# Patient Record
Sex: FEMALE | Race: WHITE | NOT HISPANIC OR LATINO | Employment: FULL TIME | URBAN - METROPOLITAN AREA
[De-identification: names, ages, dates, MRNs, and addresses within clinical notes are randomized per-mention and may not be internally consistent; named-entity substitution may affect disease eponyms.]

---

## 2022-01-18 ENCOUNTER — OFFICE VISIT (OUTPATIENT)
Dept: OBSTETRICS AND GYNECOLOGY | Facility: CLINIC | Age: 32
End: 2022-01-18
Payer: COMMERCIAL

## 2022-01-18 VITALS
BODY MASS INDEX: 17.2 KG/M2 | SYSTOLIC BLOOD PRESSURE: 112 MMHG | WEIGHT: 120.13 LBS | HEIGHT: 70 IN | DIASTOLIC BLOOD PRESSURE: 78 MMHG

## 2022-01-18 DIAGNOSIS — Z31.69 ENCOUNTER FOR PRECONCEPTION CONSULTATION: Primary | ICD-10-CM

## 2022-01-18 PROCEDURE — 99999 PR PBB SHADOW E&M-NEW PATIENT-LVL III: CPT | Mod: PBBFAC,,, | Performed by: OBSTETRICS & GYNECOLOGY

## 2022-01-18 PROCEDURE — 99999 PR PBB SHADOW E&M-NEW PATIENT-LVL III: ICD-10-PCS | Mod: PBBFAC,,, | Performed by: OBSTETRICS & GYNECOLOGY

## 2022-01-18 PROCEDURE — 99202 PR OFFICE/OUTPT VISIT, NEW, LEVL II, 15-29 MIN: ICD-10-PCS | Mod: S$GLB,,, | Performed by: OBSTETRICS & GYNECOLOGY

## 2022-01-18 PROCEDURE — 99202 OFFICE O/P NEW SF 15 MIN: CPT | Mod: S$GLB,,, | Performed by: OBSTETRICS & GYNECOLOGY

## 2022-01-18 RX ORDER — SPIRONOLACTONE 50 MG/1
50 TABLET, FILM COATED ORAL DAILY
COMMUNITY
Start: 2021-12-13 | End: 2022-08-23

## 2022-01-18 RX ORDER — TRETINOIN 0.25 MG/G
1 CREAM TOPICAL NIGHTLY
COMMUNITY
Start: 2021-12-13 | End: 2023-05-09

## 2022-01-18 RX ORDER — MULTIVIT WITH MINERALS/HERBS
1 TABLET ORAL DAILY
COMMUNITY
End: 2023-10-31

## 2022-01-18 RX ORDER — AZELAIC ACID 0.15 G/G
AEROSOL, FOAM TOPICAL
COMMUNITY
Start: 2022-01-05 | End: 2022-08-01

## 2022-01-18 RX ORDER — ERGOCALCIFEROL 1.25 MG/1
50000 CAPSULE ORAL DAILY
COMMUNITY
End: 2023-10-31

## 2022-01-18 NOTE — PROGRESS NOTES
Gynecology    SUBJECTIVE:     Chief Complaint: Establish Care (New PT- last pap unknown; LMP: 2022 not on BC at this time. /Per patient, she just had an annual with former GYN about 2 months ago. Patient would like to discuss fertility. )       History of Present Illness:  31 year old who for a preconception visit.  She was pregnant once before in 2017 and had a medical  that was unsuccessful, so she had a D&C after that.  She has no medical problems.  Had an abnormal pap smear about 13 years ago, no surgery.  Paps normal since then.  Has never had any other surgery.     Patient does have spironolactone to take for acne, but not taking it currently.  Also has retin-A and knows to stop this as well.  She is also taking Vitamin D3 and Vitamin B12.  She is taking B12 because she is a vegan.      She plans on seeing Dr. Antoine Garcia for primary care.      No family history of genetic disorders.  Does have regular monthly cycles- 22-28 days.  No history of sexually transmitted infections.  Does not have very painful cycles.     Hopes to become pregnant in the next 6 months.    Review of Systems:  Review of Systems   Genitourinary: Negative for menorrhagia, menstrual problem, pelvic pain, vaginal bleeding, vaginal discharge and vaginal pain.        OBJECTIVE:     Physical Exam:  Physical Exam  Vitals and nursing note reviewed.   Constitutional:       Appearance: She is well-developed and well-nourished.   Pulmonary:      Effort: Pulmonary effort is normal.   Skin:     Coloration: Skin is not pale.   Neurological:      Mental Status: She is oriented to person, place, and time.   Psychiatric:         Mood and Affect: Mood and affect normal.         Behavior: Behavior normal.         Thought Content: Thought content normal.         Judgment: Judgment normal.           ASSESSMENT:       ICD-10-CM ICD-9-CM    1. Encounter for preconception consultation  Z31.69 V26.49           Plan:      Yadira was seen today for  establish care.    Diagnoses and all orders for this visit:    Encounter for preconception consultation    19 minutes of total time was spent on the encounter which included face-to-face time and non-face-to-face time preparing to see the patient, obtaining and or reviewing   separately obtained history, documenting clinical information in the electronic or other health record, independently interpreting results (not separately reported) and   communicating results to the patient, or care coordination (not separately reported).    - reviewed medical history in detail and medications with patient  - discussed stopping retin A and spironolactone for about 1 month prior to conception  - appears to have normal monthly cycles; low suspicion for ovulatory complications or tubal factor to cause infertility  - discussed ovulation predictor kits, timed intercourse and pnv  - labs if interested: varicella, rubella, SMA, CF; not ordered today, patient to notify me if she would like to proceed with these  - low risk for infectious disease, but may also order HIV, hep B, gc/ct, RPR  - consider referral to nutrition if/when pregnant given vegan diet      No orders of the defined types were placed in this encounter.        Mary Grace Benitez

## 2022-01-18 NOTE — PROGRESS NOTES
History & Physical  Gynecology      SUBJECTIVE:     Chief Complaint: Establish Care (New PT- last pap unknown; LMP: 2022 not on BC at this time. /Per patient, she just had an annual with former GYN about 2 months ago. Patient would like to discuss fertility. )       History of Present Illness:  Annual Exam-Premenopausal  Patient presents for annual exam. The patient has *** complaints today***. Menstrual cycles are ***.  The patient {is/is not/has never been:21494} sexually active. GYN screening history: {gyn screen history:76904}. The patient participates in regular exercise: {yes/no/not asked:9010}.  Smoking status:  {yes/no/not asked:9010}    Contraception: ***    FH:  Breast cancer: ***  Colon cancer: ***  Ovarian cancer: ***    Review of patient's allergies indicates:   Allergen Reactions    Codeine Nausea And Vomiting       Past Medical History:   Diagnosis Date    Abnormal Pap smear of cervix      History reviewed. No pertinent surgical history.  OB History        1    Para        Term                AB   1    Living           SAB        IAB        Ectopic        Multiple        Live Births                   History reviewed. No pertinent family history.  Social History     Tobacco Use    Smoking status: Never Smoker    Smokeless tobacco: Never Used   Substance Use Topics    Alcohol use: Not Currently     Comment: drinks socially, occasionally     Drug use: Never       Current Outpatient Medications   Medication Sig    b complex vitamins tablet Take 1 tablet by mouth once daily. Takes over the counter, per patient.    ergocalciferol (VITAMIN D2) 50,000 unit Cap Take 50,000 Units by mouth once daily.    FINACEA 15 % Foam Apply topically.    tretinoin (RETIN-A) 0.025 % cream Apply 1 application topically nightly.    spironolactone (ALDACTONE) 50 MG tablet Take 50 mg by mouth once daily.     No current facility-administered medications for this visit.         Review of  Systems:  Review of Systems     OBJECTIVE:     Physical Exam:  Physical Exam    Chaperoned by: ***    ASSESSMENT:     No diagnosis found.       Plan:      There are no diagnoses linked to this encounter.    No orders of the defined types were placed in this encounter.      Well Woman:  - Pap smear ***  - Birth control: ***  - GC/CT:***  - Mammogram: ***  - Smoking cessation: ***  - Labs: ***   - Vaccines: ***  - Exercise counseling    ***:    Follow up in *** one year for annual or prn.    Mary Grace Benitez

## 2022-02-08 ENCOUNTER — OFFICE VISIT (OUTPATIENT)
Dept: INTERNAL MEDICINE | Facility: CLINIC | Age: 32
End: 2022-02-08
Payer: COMMERCIAL

## 2022-02-08 VITALS
HEART RATE: 65 BPM | SYSTOLIC BLOOD PRESSURE: 112 MMHG | WEIGHT: 122.81 LBS | OXYGEN SATURATION: 100 % | DIASTOLIC BLOOD PRESSURE: 72 MMHG | HEIGHT: 70 IN | BODY MASS INDEX: 17.58 KG/M2

## 2022-02-08 DIAGNOSIS — Z00.00 ANNUAL PHYSICAL EXAM: Primary | ICD-10-CM

## 2022-02-08 DIAGNOSIS — Z78.9 CONSUMES A VEGAN DIET: ICD-10-CM

## 2022-02-08 DIAGNOSIS — F41.8 SITUATIONAL ANXIETY: ICD-10-CM

## 2022-02-08 DIAGNOSIS — Z86.2 HISTORY OF ANEMIA: ICD-10-CM

## 2022-02-08 DIAGNOSIS — Z13.220 ENCOUNTER FOR LIPID SCREENING FOR CARDIOVASCULAR DISEASE: ICD-10-CM

## 2022-02-08 DIAGNOSIS — Z13.6 ENCOUNTER FOR LIPID SCREENING FOR CARDIOVASCULAR DISEASE: ICD-10-CM

## 2022-02-08 DIAGNOSIS — R63.4 WEIGHT LOSS, UNINTENTIONAL: ICD-10-CM

## 2022-02-08 DIAGNOSIS — F51.01 PRIMARY INSOMNIA: ICD-10-CM

## 2022-02-08 DIAGNOSIS — Z11.59 NEED FOR HEPATITIS C SCREENING TEST: ICD-10-CM

## 2022-02-08 PROCEDURE — 99999 PR PBB SHADOW E&M-EST. PATIENT-LVL III: ICD-10-PCS | Mod: PBBFAC,,, | Performed by: STUDENT IN AN ORGANIZED HEALTH CARE EDUCATION/TRAINING PROGRAM

## 2022-02-08 PROCEDURE — 99999 PR PBB SHADOW E&M-EST. PATIENT-LVL III: CPT | Mod: PBBFAC,,, | Performed by: STUDENT IN AN ORGANIZED HEALTH CARE EDUCATION/TRAINING PROGRAM

## 2022-02-08 PROCEDURE — 99385 PREV VISIT NEW AGE 18-39: CPT | Mod: S$GLB,,, | Performed by: STUDENT IN AN ORGANIZED HEALTH CARE EDUCATION/TRAINING PROGRAM

## 2022-02-08 PROCEDURE — 99385 PR PREVENTIVE VISIT,NEW,18-39: ICD-10-PCS | Mod: S$GLB,,, | Performed by: STUDENT IN AN ORGANIZED HEALTH CARE EDUCATION/TRAINING PROGRAM

## 2022-02-08 RX ORDER — TRAZODONE HYDROCHLORIDE 50 MG/1
50 TABLET ORAL NIGHTLY PRN
Qty: 30 TABLET | Refills: 2 | Status: SHIPPED | OUTPATIENT
Start: 2022-02-08 | End: 2022-05-08

## 2022-02-08 RX ORDER — PROPRANOLOL HYDROCHLORIDE 10 MG/1
TABLET ORAL
Qty: 20 TABLET | Refills: 1 | Status: SHIPPED | OUTPATIENT
Start: 2022-02-08 | End: 2022-12-15

## 2022-02-08 NOTE — PROGRESS NOTES
[facility]    Subjective:       Patient ID: Yadira Rodrigues is a 31 y.o. female.    Chief Complaint: Establish Care      History was obtained from the patient and supplemented through chart review.  This patient has not been seen by an [facility] medicine physician in the past 3 years and is new to me.    HPI:    Patient is a 31 y.o. female who presents to [facility].    Exercise: 3 x 30 min gym classes a week and few walks  Prior much less    Medical History  Past Medical History:   Diagnosis Date    Abnormal Pap smear of cervix        Review of Systems   Constitutional: Negative for fever.   HENT: Negative for trouble swallowing.    Respiratory: Negative for shortness of breath.    Cardiovascular: Negative for chest pain.   Gastrointestinal: Negative for constipation, diarrhea, nausea and vomiting.   Musculoskeletal: Negative for gait problem.   Neurological: Negative for dizziness and seizures.   Psychiatric/Behavioral: Negative for hallucinations.         Surgical hx, family hx, social hx   Have been reviewed      Current Outpatient Medications:     b complex vitamins tablet, Take 1 tablet by mouth once daily. Takes over the counter, per patient., Disp: , Rfl:     ergocalciferol (ERGOCALCIFEROL) 50,000 unit Cap, Take 50,000 Units by mouth once daily., Disp: , Rfl:     FINACEA 15 % Foam, Apply topically., Disp: , Rfl:     spironolactone (ALDACTONE) 50 MG tablet, Take 50 mg by mouth once daily., Disp: , Rfl:     tretinoin (RETIN-A) 0.025 % cream, Apply 1 application topically nightly., Disp: , Rfl:     propranoloL (INDERAL) 10 MG tablet, Take 10-20 mg as needed for performance anxiety.  Take 1/2-1 hour before situations., Disp: 20 tablet, Rfl: 1    traZODone (DESYREL) 50 MG tablet, Take 1 tablet (50 mg total) by mouth nightly as needed for Insomnia. (ok to try 100 mg), Disp: 30 tablet, Rfl: 2    Objective:        Body mass index is 17.62 kg/m².  Vitals:    02/08/22 1306   BP:  "112/72   Pulse: 65   SpO2: 100%   Weight: 55.7 kg (122 lb 12.7 oz)   Height: 5' 10" (1.778 m)   PainSc: 0-No pain     Physical Exam  Vitals and nursing note reviewed.   Constitutional:       General: She is not in acute distress.     Appearance: Normal appearance. She is not ill-appearing.   HENT:      Head: Normocephalic and atraumatic.      Nose:      Comments: Wearing a mask  Eyes:      General: No scleral icterus.  Cardiovascular:      Rate and Rhythm: Normal rate and regular rhythm.      Heart sounds: Normal heart sounds.   Pulmonary:      Effort: Pulmonary effort is normal.   Abdominal:      General: There is no distension.   Musculoskeletal:         General: No deformity.      Cervical back: Normal range of motion.   Skin:     General: Skin is warm and dry.   Neurological:      Mental Status: She is alert and oriented to person, place, and time.   Psychiatric:         Behavior: Behavior normal.           No results found for: WBC, HGB, HCT, PLT, CHOL, TRIG, HDL, LDLDIRECT, ALT, AST, NA, K, CL, CREATININE, BUN, CO2, TSH, PSA, INR, GLUF, HGBA1C, MICROALBUR    The ASCVD Risk score (Afton DC Jr., et al., 2013) failed to calculate for the following reasons:    The 2013 ASCVD risk score is only valid for ages 40 to 79    (Imaging have been independently reviewed)    Assessment:         1. Annual physical exam    2. Encounter for lipid screening for cardiovascular disease    3. History of anemia    4. Weight loss, unintentional    5. Need for hepatitis C screening test    6. Situational anxiety    7. Primary insomnia    8. Consumes a vegan diet          Plan:     Yadira was seen today for establish care.    Diagnoses and all orders for this visit:    Annual physical exam  -     Comprehensive Metabolic Panel; Future    Encounter for lipid screening for cardiovascular disease  -     Lipid Panel; Future    History of anemia  -     CBC Auto Differential; Future    Weight loss, unintentional  -     TSH; Future    Need for " hepatitis C screening test  -     Hepatitis C Antibody; Future    Situational anxiety  -     propranoloL (INDERAL) 10 MG tablet; Take 10-20 mg as needed for performance anxiety.  Take 1/2-1 hour before situations.    Primary insomnia  -     Ambulatory referral/consult to Sleep Disorders; Future  -     traZODone (DESYREL) 50 MG tablet; Take 1 tablet (50 mg total) by mouth nightly as needed for Insomnia. (ok to try 100 mg)    Consumes a vegan diet  -     Vitamin B12; Future  -     Folate; Future  -     Iron and TIBC; Future  -     Ferritin; Future        Health Maintenance  - Lipids: ordered  - A1C: glucose ordered fasting  - Colon Ca Screen: na  - Immunizations: J & J in April, Booster in Nov; tetanus approx 4-6 years    Women's health  - Pap: 10/21/2021 NILM and HPV neg, due in 5 years  - Mammo: na  - Dexa: na  - Contraception: spermicide    Follow up in about 1 year (around 2/8/2023). or sooner prn        All medications were reviewed including potential side effects and risks/benefits.  Pt was counseled to call back if anything worsens or if questions arise.    Antoine Uribe MD  Family Medicine  [facility]  [address]  [address]  [place] [address]  Phone [phone]  Fax [phone]

## 2022-02-11 ENCOUNTER — LAB VISIT (OUTPATIENT)
Dept: LAB | Facility: OTHER | Age: 32
End: 2022-02-11
Attending: STUDENT IN AN ORGANIZED HEALTH CARE EDUCATION/TRAINING PROGRAM
Payer: COMMERCIAL

## 2022-02-11 DIAGNOSIS — Z78.9 CONSUMES A VEGAN DIET: ICD-10-CM

## 2022-02-11 DIAGNOSIS — Z11.59 NEED FOR HEPATITIS C SCREENING TEST: ICD-10-CM

## 2022-02-11 DIAGNOSIS — Z13.6 ENCOUNTER FOR LIPID SCREENING FOR CARDIOVASCULAR DISEASE: ICD-10-CM

## 2022-02-11 DIAGNOSIS — R63.4 WEIGHT LOSS, UNINTENTIONAL: ICD-10-CM

## 2022-02-11 DIAGNOSIS — Z00.00 ANNUAL PHYSICAL EXAM: ICD-10-CM

## 2022-02-11 DIAGNOSIS — Z13.220 ENCOUNTER FOR LIPID SCREENING FOR CARDIOVASCULAR DISEASE: ICD-10-CM

## 2022-02-11 DIAGNOSIS — Z86.2 HISTORY OF ANEMIA: ICD-10-CM

## 2022-02-11 LAB
ALBUMIN SERPL BCP-MCNC: 4.3 G/DL (ref 3.5–5.2)
ALP SERPL-CCNC: 52 U/L (ref 55–135)
ALT SERPL W/O P-5'-P-CCNC: 9 U/L (ref 10–44)
ANION GAP SERPL CALC-SCNC: 7 MMOL/L (ref 8–16)
AST SERPL-CCNC: 13 U/L (ref 10–40)
BASOPHILS # BLD AUTO: 0.03 K/UL (ref 0–0.2)
BASOPHILS NFR BLD: 0.6 % (ref 0–1.9)
BILIRUB SERPL-MCNC: 1.9 MG/DL (ref 0.1–1)
BUN SERPL-MCNC: 8 MG/DL (ref 6–20)
CALCIUM SERPL-MCNC: 9.1 MG/DL (ref 8.7–10.5)
CHLORIDE SERPL-SCNC: 103 MMOL/L (ref 95–110)
CHOLEST SERPL-MCNC: 142 MG/DL (ref 120–199)
CHOLEST/HDLC SERPL: 2.2 {RATIO} (ref 2–5)
CO2 SERPL-SCNC: 25 MMOL/L (ref 23–29)
CREAT SERPL-MCNC: 0.8 MG/DL (ref 0.5–1.4)
DIFFERENTIAL METHOD: NORMAL
EOSINOPHIL # BLD AUTO: 0.2 K/UL (ref 0–0.5)
EOSINOPHIL NFR BLD: 3.1 % (ref 0–8)
ERYTHROCYTE [DISTWIDTH] IN BLOOD BY AUTOMATED COUNT: 12.5 % (ref 11.5–14.5)
EST. GFR  (AFRICAN AMERICAN): >60 ML/MIN/1.73 M^2
EST. GFR  (NON AFRICAN AMERICAN): >60 ML/MIN/1.73 M^2
FERRITIN SERPL-MCNC: 31 NG/ML (ref 20–300)
FOLATE SERPL-MCNC: 11.4 NG/ML (ref 4–24)
GLUCOSE SERPL-MCNC: 88 MG/DL (ref 70–110)
HCT VFR BLD AUTO: 38.7 % (ref 37–48.5)
HDLC SERPL-MCNC: 65 MG/DL (ref 40–75)
HDLC SERPL: 45.8 % (ref 20–50)
HGB BLD-MCNC: 13 G/DL (ref 12–16)
IMM GRANULOCYTES # BLD AUTO: 0.01 K/UL (ref 0–0.04)
IMM GRANULOCYTES NFR BLD AUTO: 0.2 % (ref 0–0.5)
IRON SERPL-MCNC: 113 UG/DL (ref 30–160)
LDLC SERPL CALC-MCNC: 68.6 MG/DL (ref 63–159)
LYMPHOCYTES # BLD AUTO: 2.1 K/UL (ref 1–4.8)
LYMPHOCYTES NFR BLD: 43.7 % (ref 18–48)
MCH RBC QN AUTO: 30.9 PG (ref 27–31)
MCHC RBC AUTO-ENTMCNC: 33.6 G/DL (ref 32–36)
MCV RBC AUTO: 92 FL (ref 82–98)
MONOCYTES # BLD AUTO: 0.3 K/UL (ref 0.3–1)
MONOCYTES NFR BLD: 6.9 % (ref 4–15)
NEUTROPHILS # BLD AUTO: 2.2 K/UL (ref 1.8–7.7)
NEUTROPHILS NFR BLD: 45.5 % (ref 38–73)
NONHDLC SERPL-MCNC: 77 MG/DL
NRBC BLD-RTO: 0 /100 WBC
PLATELET # BLD AUTO: 208 K/UL (ref 150–450)
PMV BLD AUTO: 10.3 FL (ref 9.2–12.9)
POTASSIUM SERPL-SCNC: 4 MMOL/L (ref 3.5–5.1)
PROT SERPL-MCNC: 7.2 G/DL (ref 6–8.4)
RBC # BLD AUTO: 4.21 M/UL (ref 4–5.4)
SATURATED IRON: 29 % (ref 20–50)
SODIUM SERPL-SCNC: 135 MMOL/L (ref 136–145)
TOTAL IRON BINDING CAPACITY: 395 UG/DL (ref 250–450)
TRANSFERRIN SERPL-MCNC: 267 MG/DL (ref 200–375)
TRIGL SERPL-MCNC: 42 MG/DL (ref 30–150)
TSH SERPL DL<=0.005 MIU/L-ACNC: 1.07 UIU/ML (ref 0.4–4)
VIT B12 SERPL-MCNC: 616 PG/ML (ref 210–950)
WBC # BLD AUTO: 4.78 K/UL (ref 3.9–12.7)

## 2022-02-11 PROCEDURE — 84466 ASSAY OF TRANSFERRIN: CPT | Performed by: STUDENT IN AN ORGANIZED HEALTH CARE EDUCATION/TRAINING PROGRAM

## 2022-02-11 PROCEDURE — 85025 COMPLETE CBC W/AUTO DIFF WBC: CPT | Performed by: STUDENT IN AN ORGANIZED HEALTH CARE EDUCATION/TRAINING PROGRAM

## 2022-02-11 PROCEDURE — 82728 ASSAY OF FERRITIN: CPT | Performed by: STUDENT IN AN ORGANIZED HEALTH CARE EDUCATION/TRAINING PROGRAM

## 2022-02-11 PROCEDURE — 80061 LIPID PANEL: CPT | Performed by: STUDENT IN AN ORGANIZED HEALTH CARE EDUCATION/TRAINING PROGRAM

## 2022-02-11 PROCEDURE — 82607 VITAMIN B-12: CPT | Performed by: STUDENT IN AN ORGANIZED HEALTH CARE EDUCATION/TRAINING PROGRAM

## 2022-02-11 PROCEDURE — 84443 ASSAY THYROID STIM HORMONE: CPT | Performed by: STUDENT IN AN ORGANIZED HEALTH CARE EDUCATION/TRAINING PROGRAM

## 2022-02-11 PROCEDURE — 86803 HEPATITIS C AB TEST: CPT | Performed by: STUDENT IN AN ORGANIZED HEALTH CARE EDUCATION/TRAINING PROGRAM

## 2022-02-11 PROCEDURE — 80053 COMPREHEN METABOLIC PANEL: CPT | Performed by: STUDENT IN AN ORGANIZED HEALTH CARE EDUCATION/TRAINING PROGRAM

## 2022-02-11 PROCEDURE — 36415 COLL VENOUS BLD VENIPUNCTURE: CPT | Performed by: STUDENT IN AN ORGANIZED HEALTH CARE EDUCATION/TRAINING PROGRAM

## 2022-02-11 PROCEDURE — 82746 ASSAY OF FOLIC ACID SERUM: CPT | Performed by: STUDENT IN AN ORGANIZED HEALTH CARE EDUCATION/TRAINING PROGRAM

## 2022-02-14 LAB — HCV AB SERPL QL IA: NEGATIVE

## 2022-03-17 ENCOUNTER — OFFICE VISIT (OUTPATIENT)
Dept: SLEEP MEDICINE | Facility: CLINIC | Age: 32
End: 2022-03-17
Payer: COMMERCIAL

## 2022-03-17 DIAGNOSIS — Z78.9 CONSUMES A VEGAN DIET: ICD-10-CM

## 2022-03-17 DIAGNOSIS — G47.25 JET LAG: ICD-10-CM

## 2022-03-17 DIAGNOSIS — F41.8 SITUATIONAL ANXIETY: ICD-10-CM

## 2022-03-17 DIAGNOSIS — F51.01 PRIMARY INSOMNIA: ICD-10-CM

## 2022-03-17 DIAGNOSIS — F51.02 ADJUSTMENT INSOMNIA: Primary | ICD-10-CM

## 2022-03-17 DIAGNOSIS — Z86.2 HISTORY OF ANEMIA: ICD-10-CM

## 2022-03-17 PROCEDURE — 99202 PR OFFICE/OUTPT VISIT, NEW, LEVL II, 15-29 MIN: ICD-10-PCS | Mod: 95,,, | Performed by: INTERNAL MEDICINE

## 2022-03-17 PROCEDURE — 99202 OFFICE O/P NEW SF 15 MIN: CPT | Mod: 95,,, | Performed by: INTERNAL MEDICINE

## 2022-03-17 RX ORDER — ZOLPIDEM TARTRATE 5 MG/1
5 TABLET ORAL NIGHTLY PRN
Qty: 30 TABLET | Refills: 2 | Status: SHIPPED | OUTPATIENT
Start: 2022-03-17 | End: 2023-05-09

## 2022-03-17 NOTE — PROGRESS NOTES
Subjective:       Patient ID: Yadira Rodrigues is a 31 y.o. female.    Chief Complaint: Sleeping Problem    I had the pleasure of seeing Yadira Rodrigues today, who is a 31 y.o. female that presents with difficulty sleeping with travel.    Yadira Rodrigues does not have a CDL.    Yadira Rodrigues is not a shift worker.    Yadira Rodrigues presents with has interrupted sleep that has been going on for 5 years    Bedtime when working ranges from 2245 to 2300.   When not working, bedtime ranges from 2245 to 2400.   Sleep latency ranges from 15 to 30+ minutes.     Average number of awakenings is 1 and return to sleep is quick.   Wake up time when working is 0630 to 0730.   When not working, wake up time is 0700 to 0800.   Patient does feel rested upon awakening.    Yadira Rodrigues consumes approximately 1-3 beverages with caffeine daily.   An average of 2 beverages with alcohol are consumed weekly   Medications taken for sleep currently: Xanax about 1x monthly  Previous medications taken: Ambien, trazodone     Yadira Rodrigues does not experience daytime sleepiness.   Naps are taken about 0 times weekly, usually lasting NA to NA minutes.  Yadira currently does operate an automobile.  Yadira Rodrigues does not experience drowsiness when driving.   Patient does not doze off when sedentary.   Yadira Rodrigues does not have auxiliary symptoms of narcolepsy including sleep onset paralysis, hypnagogic hallucinations, sleep attacks and cataplexy    EPWORTH SLEEPINESS SCALE 3/17/2022   Sitting and reading 0   Watching TV 0   Sitting, inactive in a public place (e.g. a theatre or a meeting) 0   As a passenger in a car for an hour without a break 0   Lying down to rest in the afternoon when circumstances permit 1   Sitting and talking to someone 0   Sitting quietly after a lunch without alcohol 0   In a car, while stopped for a few minutes in traffic 0   Total score 1       Yadira Rodrigues does not have a history of snoring.    Snoring is described as NA.   Apneic episodes have not been noticed during sleep.   A witness to sleep is present.   The patient awakens with NA.      Yadira Rodrigues does not have symptoms of Restless Legs Syndrome. Nocturnal leg movements have been noticed.   The patient does not experience sleep related leg cramps.   There is not a history of parasomnia.      Current Outpatient Medications:     b complex vitamins tablet, Take 1 tablet by mouth once daily. Takes over the counter, per patient., Disp: , Rfl:     ergocalciferol (ERGOCALCIFEROL) 50,000 unit Cap, Take 50,000 Units by mouth once daily., Disp: , Rfl:     FINACEA 15 % Foam, Apply topically., Disp: , Rfl:     propranoloL (INDERAL) 10 MG tablet, Take 10-20 mg as needed for performance anxiety.  Take 1/2-1 hour before situations., Disp: 20 tablet, Rfl: 1    spironolactone (ALDACTONE) 50 MG tablet, Take 50 mg by mouth once daily., Disp: , Rfl:     traZODone (DESYREL) 50 MG tablet, Take 1 tablet (50 mg total) by mouth nightly as needed for Insomnia. (ok to try 100 mg), Disp: 30 tablet, Rfl: 2    tretinoin (RETIN-A) 0.025 % cream, Apply 1 application topically nightly., Disp: , Rfl:      Review of patient's allergies indicates:   Allergen Reactions    Codeine Nausea And Vomiting         Past Medical History:   Diagnosis Date    Abnormal Pap smear of cervix        History reviewed. No pertinent surgical history.    Family History   Problem Relation Age of Onset    Hypertension Father     Hyperlipidemia Father     Prostate cancer Father         s/p resection    Hypertension Mother     Mental illness Sister     Mental illness Maternal Grandmother     Mental illness Paternal Grandmother     Cancer Maternal Uncle         lung?    Colon cancer Neg Hx     Breast cancer Neg Hx     Ovarian cancer Neg Hx        Social History     Socioeconomic History    Marital status:    Tobacco Use    Smoking status: Never Smoker    Smokeless tobacco:  Never Used   Substance and Sexual Activity    Alcohol use: Not Currently     Alcohol/week: 4.0 standard drinks     Types: 4 Standard drinks or equivalent per week     Comment: drinks socially, occasionally     Drug use: Never    Sexual activity: Yes     Partners: Male     Birth control/protection: Spermicide           Old medical records.    There were no vitals filed for this visit.           The patient was given open opportunity to ask questions and/or express concerns about treatment plan.   All questions/concerns were discussed.   Driving precautions were provided.     Two patient identifiers used prior to evaluation.    Thank you for referring Yadira BRITTANY Rodrigues for evaluation.             Past Medical History:   Diagnosis Date    Abnormal Pap smear of cervix      History reviewed. No pertinent surgical history.  Family History   Problem Relation Age of Onset    Hypertension Father     Hyperlipidemia Father     Prostate cancer Father         s/p resection    Hypertension Mother     Mental illness Sister     Mental illness Maternal Grandmother     Mental illness Paternal Grandmother     Cancer Maternal Uncle         lung?    Colon cancer Neg Hx     Breast cancer Neg Hx     Ovarian cancer Neg Hx      Social History     Socioeconomic History    Marital status:    Tobacco Use    Smoking status: Never Smoker    Smokeless tobacco: Never Used   Substance and Sexual Activity    Alcohol use: Not Currently     Alcohol/week: 4.0 standard drinks     Types: 4 Standard drinks or equivalent per week     Comment: drinks socially, occasionally     Drug use: Never    Sexual activity: Yes     Partners: Male     Birth control/protection: Spermicide       Current Outpatient Medications   Medication Sig Dispense Refill    b complex vitamins tablet Take 1 tablet by mouth once daily. Takes over the counter, per patient.      ergocalciferol (ERGOCALCIFEROL) 50,000 unit Cap Take 50,000 Units by mouth once  daily.      FINACEA 15 % Foam Apply topically.      propranoloL (INDERAL) 10 MG tablet Take 10-20 mg as needed for performance anxiety.  Take 1/2-1 hour before situations. 20 tablet 1    spironolactone (ALDACTONE) 50 MG tablet Take 50 mg by mouth once daily.      traZODone (DESYREL) 50 MG tablet Take 1 tablet (50 mg total) by mouth nightly as needed for Insomnia. (ok to try 100 mg) 30 tablet 2    tretinoin (RETIN-A) 0.025 % cream Apply 1 application topically nightly.       No current facility-administered medications for this visit.     Review of patient's allergies indicates:   Allergen Reactions    Codeine Nausea And Vomiting       Review of Systems    Objective:      There were no vitals filed for this visit.  Physical Exam    Lab Review:   BMP:   Lab Results   Component Value Date    GLU 88 02/11/2022     (L) 02/11/2022    K 4.0 02/11/2022     02/11/2022    CO2 25 02/11/2022    BUN 8 02/11/2022    CREATININE 0.8 02/11/2022    CALCIUM 9.1 02/11/2022     Diagnostics Review: Echo: Reviewed     Assessment:       1. Adjustment insomnia    2. Consumes a vegan diet    3. History of anemia    4. Situational anxiety    5. Jet lag        Plan:       Discontinue trazodone.   Ambien prn.   Side effects discussed.    The patient location is: home  The chief complaint leading to consultation is: intermittent insomnia    Visit type: audiovisual    Face to Face time with patient: 9  22 minutes of total time spent on the encounter, which includes face to face time and non-face to face time preparing to see the patient (eg, review of tests), Obtaining and/or reviewing separately obtained history, Documenting clinical information in the electronic or other health record, Independently interpreting results (not separately reported) and communicating results to the patient/family/caregiver, or Care coordination (not separately reported).         Each patient to whom he or she provides medical services by  telemedicine is:  (1) informed of the relationship between the physician and patient and the respective role of any other health care provider with respect to management of the patient; and (2) notified that he or she may decline to receive medical services by telemedicine and may withdraw from such care at any time.    Notes:

## 2022-05-07 DIAGNOSIS — F51.01 PRIMARY INSOMNIA: ICD-10-CM

## 2022-05-07 NOTE — TELEPHONE ENCOUNTER
No new care gaps identified.  [facility] Embedded Care Gaps. Reference number: [id number]. 5/07/2022   7:52:24 AM CDT

## 2022-05-07 NOTE — TELEPHONE ENCOUNTER
Refill Routing Note   Medication(s) are not appropriate for processing by [facility] for the following reason(s):      - Indication is outside of scope for [facility]    [facility] action(s):  Defer       Medication Therapy Plan: Indication outside of protocol  Medication reconciliation completed: No     Appointments  past 12m or future 3m with PCP    Date Provider   Last Visit   2/8/2022 Antoine Uribe MD   Next Visit   Visit date not found Antoine Uribe MD   ED visits in past 90 days: 0        Note composed:4:26 PM 05/07/2022

## 2022-05-08 RX ORDER — TRAZODONE HYDROCHLORIDE 50 MG/1
TABLET ORAL
Qty: 90 TABLET | Refills: 3 | Status: SHIPPED | OUTPATIENT
Start: 2022-05-08 | End: 2022-08-01

## 2022-06-01 ENCOUNTER — OFFICE VISIT (OUTPATIENT)
Dept: INTERNAL MEDICINE | Facility: CLINIC | Age: 32
End: 2022-06-01
Payer: COMMERCIAL

## 2022-06-01 DIAGNOSIS — R55 SYNCOPE, UNSPECIFIED SYNCOPE TYPE: Primary | ICD-10-CM

## 2022-06-01 PROCEDURE — 99214 OFFICE O/P EST MOD 30 MIN: CPT | Mod: 95,,, | Performed by: PHYSICIAN ASSISTANT

## 2022-06-01 PROCEDURE — 99214 PR OFFICE/OUTPT VISIT, EST, LEVL IV, 30-39 MIN: ICD-10-PCS | Mod: 95,,, | Performed by: PHYSICIAN ASSISTANT

## 2022-06-01 NOTE — PROGRESS NOTES
INTERNAL MEDICINE URGENT VISIT NOTE  VIRTUAL     CHIEF COMPLAINT     Chief Complaint   Patient presents with    Loss of Consciousness       HPI     Yadira Rodrigues is a 32 y.o. female who presents for an urgent visit today.    PCP is Antoine Uribe MD, patient is new to me.     Patient presents with complaints of syncopal episodes that started about two weeks ago. Episode happened when she was trying on her wedding gown (pt getting  in 4 weeks). She admits that she stood still in once place while trying on her gown, she would faint every time. She reports this happened about 3-4 times - never falling to the ground -was able to get to seated position before synopsizing. No associated chest pain or SOB.     She reports that these episodes have subsided but pt admits that she is not locking her knees when she stands still and has been limiting her activities -though she has been able to exercise daily. She denies associated nausea, vomiting, HA, vision changes. No significant history of passing out. Takes no medications.     Did have COVID 2 weeks before symptoms started - had lots of vomiting during COVD. She does admit that she hydrates well. She has also had irregular periods for the past two months -she attributed this to COVID. She doubts she is pregnant.     She denies lower extremity swelling, irregular or unexpected bleeding. Normal appetite.     She is in [place] during this VV  Face time is 30 mins.     Past Medical History:  Past Medical History:   Diagnosis Date    Abnormal Pap smear of cervix        Home Medications:  Prior to Admission medications    Medication Sig Start Date End Date Taking? Authorizing Provider   b complex vitamins tablet Take 1 tablet by mouth once daily. Takes over the counter, per patient.    Historical Provider   ergocalciferol (ERGOCALCIFEROL) 50,000 unit Cap Take 50,000 Units by mouth once daily.    Historical Provider   FINACEA 15 % Foam Apply topically. 1/5/22    Historical Provider   propranoloL (INDERAL) 10 MG tablet Take 10-20 mg as needed for performance anxiety.  Take 1/2-1 hour before situations. 2/8/22   Antoine Uribe MD   spironolactone (ALDACTONE) 50 MG tablet Take 50 mg by mouth once daily. 12/13/21   Historical Provider   traZODone (DESYREL) 50 MG tablet TAKE 1 TABLET (50 MG TOTAL) BY MOUTH NIGHTLY AS NEEDED FOR INSOMNIA 5/8/22   Antoine Uribe MD   tretinoin (RETIN-A) 0.025 % cream Apply 1 application topically nightly. 12/13/21   Historical Provider   zolpidem (AMBIEN) 5 MG Tab Take 1 tablet (5 mg total) by mouth nightly as needed. 3/17/22   Celena Molina MD       Review of Systems:  Review of Systems   Constitutional: Negative for activity change and unexpected weight change.   HENT: Negative for hearing loss, rhinorrhea and trouble swallowing.    Eyes: Negative for discharge and visual disturbance.   Respiratory: Negative for chest tightness and wheezing.    Cardiovascular: Negative for chest pain and palpitations.   Gastrointestinal: Negative for blood in stool, constipation, diarrhea and vomiting.   Endocrine: Negative for polydipsia and polyuria.   Genitourinary: Negative for difficulty urinating, dysuria, hematuria and menstrual problem.   Musculoskeletal: Negative for arthralgias, joint swelling and neck pain.   Neurological: Positive for syncope. Negative for weakness and headaches.   Psychiatric/Behavioral: Negative for confusion and dysphoric mood.       Health Maintainence:   Immunizations:  Health Maintenance       Date Due Completion Date    Cervical Cancer Screening Never done ---    TETANUS VACCINE Never done ---    HIV Screening 02/08/2028 (Originally 5/26/2005) ---           PHYSICAL EXAM     There were no vitals taken for this VIRTUAL visit.    Physical Exam  Constitutional:       Appearance: Normal appearance.      Comments: Healthy appearing female in NAD or apparent pain. She makes good eye contact, speaks in clear full sentences  and ambulates with ease.      HENT:      Head: Normocephalic and atraumatic.      Nose: Nose normal.      Mouth/Throat:      Pharynx: Oropharynx is clear.   Eyes:      Conjunctiva/sclera: Conjunctivae normal.   Cardiovascular:      Pulses: Normal pulses.   Pulmonary:      Effort: No respiratory distress.   Musculoskeletal:         General: Normal range of motion.      Cervical back: No rigidity.   Skin:     Capillary Refill: Capillary refill takes less than 2 seconds.      Findings: No rash.   Neurological:      General: No focal deficit present.      Mental Status: She is alert.   Psychiatric:         Mood and Affect: Mood normal.         LABS     No results found for: LABA1C, HGBA1C  CMP  Sodium   Date Value Ref Range Status   02/11/2022 135 (L) 136 - 145 mmol/L Final     Potassium   Date Value Ref Range Status   02/11/2022 4.0 3.5 - 5.1 mmol/L Final     Chloride   Date Value Ref Range Status   02/11/2022 103 95 - 110 mmol/L Final     CO2   Date Value Ref Range Status   02/11/2022 25 23 - 29 mmol/L Final     Glucose   Date Value Ref Range Status   02/11/2022 88 70 - 110 mg/dL Final     BUN   Date Value Ref Range Status   02/11/2022 8 6 - 20 mg/dL Final     Creatinine   Date Value Ref Range Status   02/11/2022 0.8 0.5 - 1.4 mg/dL Final     Calcium   Date Value Ref Range Status   02/11/2022 9.1 8.7 - 10.5 mg/dL Final     Total Protein   Date Value Ref Range Status   02/11/2022 7.2 6.0 - 8.4 g/dL Final     Albumin   Date Value Ref Range Status   02/11/2022 4.3 3.5 - 5.2 g/dL Final     Total Bilirubin   Date Value Ref Range Status   02/11/2022 1.9 (H) 0.1 - 1.0 mg/dL Final     Comment:     For infants and newborns, interpretation of results should be based  on gestational age, weight and in agreement with clinical  observations.    Premature Infant recommended reference ranges:  Up to 24 hours.............<8.0 mg/dL  Up to 48 hours............<12.0 mg/dL  3-5 days..................<15.0 mg/dL  6-29  days.................<15.0 mg/dL       Alkaline Phosphatase   Date Value Ref Range Status   02/11/2022 52 (L) 55 - 135 U/L Final     AST   Date Value Ref Range Status   02/11/2022 13 10 - 40 U/L Final     ALT   Date Value Ref Range Status   02/11/2022 9 (L) 10 - 44 U/L Final     Anion Gap   Date Value Ref Range Status   02/11/2022 7 (L) 8 - 16 mmol/L Final     eGFR if    Date Value Ref Range Status   02/11/2022 >60 >60 mL/min/1.73 m^2 Final     eGFR if non    Date Value Ref Range Status   02/11/2022 >60 >60 mL/min/1.73 m^2 Final     Comment:     Calculation used to obtain the estimated glomerular filtration  rate (eGFR) is the CKD-EPI equation.        Lab Results   Component Value Date    WBC 4.78 02/11/2022    HGB 13.0 02/11/2022    HCT 38.7 02/11/2022    MCV 92 02/11/2022     02/11/2022     Lab Results   Component Value Date    CHOL 142 02/11/2022     Lab Results   Component Value Date    HDL 65 02/11/2022     Lab Results   Component Value Date    LDLCALC 68.6 02/11/2022     Lab Results   Component Value Date    TRIG 42 02/11/2022     Lab Results   Component Value Date    CHOLHDL 45.8 02/11/2022     Lab Results   Component Value Date    TSH 1.069 02/11/2022       ASSESSMENT/PLAN     Yadira Rodrigues is a 32 y.o. female     Yadira was seen today for loss of consciousness. Outpatient work-up started, will have patient scheduled for inpatient evaluation as well. She is aware of ED prompts.     Diagnoses and all orders for this visit:    Syncope, unspecified syncope type  -     CBC Auto Differential; Future  -     Comprehensive Metabolic Panel; Future  -     EKG 12-lead; Future  -     HCG, Quantitative; Future  -     URINALYSIS  -     Urine culture       Patient was counseled on when and how to seek emergent care.       Anjana Petty PA-C   Department of Internal Medicine - [facility]   1:45 PM

## 2022-08-01 ENCOUNTER — OFFICE VISIT (OUTPATIENT)
Dept: OBSTETRICS AND GYNECOLOGY | Facility: CLINIC | Age: 32
End: 2022-08-01
Payer: COMMERCIAL

## 2022-08-01 ENCOUNTER — LAB VISIT (OUTPATIENT)
Dept: LAB | Facility: HOSPITAL | Age: 32
End: 2022-08-01
Attending: NURSE PRACTITIONER
Payer: COMMERCIAL

## 2022-08-01 VITALS
SYSTOLIC BLOOD PRESSURE: 118 MMHG | WEIGHT: 116.88 LBS | DIASTOLIC BLOOD PRESSURE: 76 MMHG | HEIGHT: 70 IN | BODY MASS INDEX: 16.73 KG/M2

## 2022-08-01 DIAGNOSIS — N92.6 LATE MENSES: Primary | ICD-10-CM

## 2022-08-01 DIAGNOSIS — N92.6 LATE MENSES: ICD-10-CM

## 2022-08-01 LAB
HCG INTACT+B SERPL-ACNC: <2.4 MIU/ML
TSH SERPL DL<=0.005 MIU/L-ACNC: 1.34 UIU/ML (ref 0.4–4)

## 2022-08-01 PROCEDURE — 99213 PR OFFICE/OUTPT VISIT, EST, LEVL III, 20-29 MIN: ICD-10-PCS | Mod: S$GLB,,, | Performed by: NURSE PRACTITIONER

## 2022-08-01 PROCEDURE — 84443 ASSAY THYROID STIM HORMONE: CPT | Performed by: NURSE PRACTITIONER

## 2022-08-01 PROCEDURE — 84702 CHORIONIC GONADOTROPIN TEST: CPT | Performed by: NURSE PRACTITIONER

## 2022-08-01 PROCEDURE — 36415 COLL VENOUS BLD VENIPUNCTURE: CPT | Mod: PN | Performed by: NURSE PRACTITIONER

## 2022-08-01 PROCEDURE — 99999 PR PBB SHADOW E&M-EST. PATIENT-LVL III: ICD-10-PCS | Mod: PBBFAC,,, | Performed by: NURSE PRACTITIONER

## 2022-08-01 PROCEDURE — 99213 OFFICE O/P EST LOW 20 MIN: CPT | Mod: S$GLB,,, | Performed by: NURSE PRACTITIONER

## 2022-08-01 PROCEDURE — 99999 PR PBB SHADOW E&M-EST. PATIENT-LVL III: CPT | Mod: PBBFAC,,, | Performed by: NURSE PRACTITIONER

## 2022-08-01 NOTE — PROGRESS NOTES
CC: late cycle     HPI: Pt is a 32 y.o.  female who presents c/o late cycle. LMP 6/28/22.  Reports cycles are normal monthly she has never been late in past. She reports some increased stress and she  has been traveling. UPT is negative.  She has upcoming trip planned later in the week and wants to ensure not pregnancy. She is not on contraception.        ROS:  GENERAL: Feeling well overall. Denies fever or chills.   SKIN: Denies rash or lesions.   HEAD: Denies head injury or headache.   NODES: Denies enlarged lymph nodes.   CHEST: Denies chest pain or shortness of breath.   CARDIOVASCULAR: Denies palpitations or left sided chest pain.   ABDOMEN: No abdominal pain, constipation, diarrhea, nausea, vomiting or rectal bleeding.   URINARY: No dysuria, hematuria, or burning on urination.  REPRODUCTIVE: See HPI.   BREASTS: Denies pain, lumps, or nipple discharge.   HEMATOLOGIC: No easy bruisability or excessive bleeding.   MUSCULOSKELETAL: Denies joint pain or swelling.   NEUROLOGIC: Denies syncope or weakness.   PSYCHIATRIC: Denies depression, anxiety or mood swings.    PE:   APPEARANCE: Well nourished, well developed, White female in no acute distress.  PELVIS: deferred       Diagnosis:  1. Late menses        Plan:   UPT is negative   Hcg to confirm   TSH  Discussed if no cycle after 3 months to notify clinic and will trial progesterone for withdrawal     Orders Placed This Encounter    HCG, Quantitative    TSH    POCT Urine Pregnancy         Follow-up PRN no resolution of symptoms.      Marta Gray, TATIANNA-STEVEN

## 2022-08-22 NOTE — PROGRESS NOTES
"The patient location is: home3  The chief complaint leading to consultation is: anxiety, panic attacks    Visit type: audiovisual    Face to Face time with patient: 22  35 minutes of total time spent on the encounter, which includes face to face time and non-face to face time preparing to see the patient (eg, review of tests), Obtaining and/or reviewing separately obtained history, Documenting clinical information in the electronic or other health record, Independently interpreting results (not separately reported) and communicating results to the patient/family/caregiver, or Care coordination (not separately reported).         Each patient to whom he or she provides medical services by telemedicine is:  (1) informed of the relationship between the physician and patient and the respective role of any other health care provider with respect to management of the patient; and (2) notified that he or she may decline to receive medical services by telemedicine and may withdraw from such care at any time.    Notes:             [facility]    Subjective:       Patient ID: Yadira Rodrigues is a 32 y.o. female.    Chief Complaint: No chief complaint on file.      History was obtained from the patient and supplemented through chart review.  This patient is known to me.     HPI:    Patient is a 32 y.o. female who presents due to panic attacks    At rehearsal dinner for wedding, "heart was beating" "difficulty breathing"  Had energy drink right before dinner  Wanted to leave but couldn't leave, nauseated  Disappeared as soon as able to leave the room, but then returned later that evening  Ok for day of wedding  Cut back on caffeeine afterwards    More recently, on plane to [place], needs to be on aisle  Multiple men organized movement with conflict with air      Rx hydroxyzine, few ativan  Referral therapy  Discussed minimal use of benzo  Declines ssri, discussed briefly  Sister doc in [facility] on " SSRI    Syncope after covid, resolved    Panic attacks/anxiety attacks    Exercise: 3 x 30 min gym classes a week and few walks  Prior much less    Medical History  Past Medical History:   Diagnosis Date    Abnormal Pap smear of cervix        Review of Systems   Constitutional: Negative for activity change.   HENT: Negative for hearing loss and trouble swallowing.    Eyes: Negative for discharge.   Respiratory: Negative for chest tightness and wheezing.    Cardiovascular: Negative for chest pain and palpitations.   Gastrointestinal: Negative for constipation, diarrhea and vomiting.   Genitourinary: Negative for difficulty urinating and hematuria.   Neurological: Negative for headaches.   Psychiatric/Behavioral: Negative for dysphoric mood. The patient is nervous/anxious.          Surgical hx, family hx, social hx   Have been reviewed      Current Outpatient Medications:     b complex vitamins tablet, Take 1 tablet by mouth once daily. Takes over the counter, per patient., Disp: , Rfl:     ergocalciferol (ERGOCALCIFEROL) 50,000 unit Cap, Take 50,000 Units by mouth once daily., Disp: , Rfl:     hydrOXYzine HCL (ATARAX) 10 MG Tab, Take 1-2 tabs as needed up to three times a day. Can cause drowsiness., Disp: 30 tablet, Rfl: 3    LORazepam (ATIVAN) 0.5 MG tablet, Take 1 tablet (0.5 mg total) by mouth every 6 (six) hours as needed for Anxiety., Disp: 15 tablet, Rfl: 0    propranoloL (INDERAL) 10 MG tablet, Take 10-20 mg as needed for performance anxiety.  Take 1/2-1 hour before situations., Disp: 20 tablet, Rfl: 1    spironolactone (ALDACTONE) 50 MG tablet, Take 50 mg by mouth once daily., Disp: , Rfl:     tretinoin (RETIN-A) 0.025 % cream, Apply 1 application topically nightly., Disp: , Rfl:     zolpidem (AMBIEN) 5 MG Tab, Take 1 tablet (5 mg total) by mouth nightly as needed., Disp: 30 tablet, Rfl: 2    Objective:        There is no height or weight on file to calculate BMI.  There were no vitals filed for this  visit.  Physical Exam  Vitals and nursing note reviewed.   Constitutional:       General: She is not in acute distress.     Appearance: Normal appearance. She is not ill-appearing.   HENT:      Head: Normocephalic and atraumatic.      Nose:      Comments: Wearing a mask  Eyes:      General: No scleral icterus.  Pulmonary:      Effort: Pulmonary effort is normal.   Musculoskeletal:         General: No deformity.      Cervical back: Normal range of motion.   Skin:     General: Skin is warm and dry.   Neurological:      Mental Status: She is alert and oriented to person, place, and time.   Psychiatric:         Behavior: Behavior normal.           Lab Results   Component Value Date    WBC 4.78 02/11/2022    HGB 13.0 02/11/2022    HCT 38.7 02/11/2022     02/11/2022    CHOL 142 02/11/2022    TRIG 42 02/11/2022    HDL 65 02/11/2022    ALT 9 (L) 02/11/2022    AST 13 02/11/2022     (L) 02/11/2022    K 4.0 02/11/2022     02/11/2022    CREATININE 0.8 02/11/2022    BUN 8 02/11/2022    CO2 25 02/11/2022    TSH 1.337 08/01/2022       The ASCVD Risk score (Dryden DC Jr., et al., 2013) failed to calculate for the following reasons:    The 2013 ASCVD risk score is only valid for ages 40 to 79    (Imaging have been independently reviewed)    Assessment:         1. Anxiety          Plan:     Diagnoses and all orders for this visit:    Anxiety  -     hydrOXYzine HCL (ATARAX) 10 MG Tab; Take 1-2 tabs as needed up to three times a day. Can cause drowsiness.  -     LORazepam (ATIVAN) 0.5 MG tablet; Take 1 tablet (0.5 mg total) by mouth every 6 (six) hours as needed for Anxiety.        No SI, precautions given    Health Maintenance  - Lipids:   - A1C:  - Colon Ca Screen: na  - Immunizations: J & J in April, Booster in Nov; tetanus approx 4-6 years    Women's health  - Pap: 10/21/2021 NILM and HPV neg, due in 5 years  - Mammo: na  - Dexa: na  - Contraception: spermicide    Follow up in about 4 weeks (around 9/20/2022). or  sooner prn        All medications were reviewed including potential side effects and risks/benefits.  Pt was counseled to call back if anything worsens or if questions arise.    Antoine Uribe MD  Family Medicine  [facility]  [employer] [address] [address]  Phone [phone]  Fax [phone]

## 2022-08-23 ENCOUNTER — OFFICE VISIT (OUTPATIENT)
Dept: INTERNAL MEDICINE | Facility: CLINIC | Age: 32
End: 2022-08-23
Payer: COMMERCIAL

## 2022-08-23 DIAGNOSIS — F41.9 ANXIETY: Primary | ICD-10-CM

## 2022-08-23 PROCEDURE — 99214 PR OFFICE/OUTPT VISIT, EST, LEVL IV, 30-39 MIN: ICD-10-PCS | Mod: 95,,, | Performed by: STUDENT IN AN ORGANIZED HEALTH CARE EDUCATION/TRAINING PROGRAM

## 2022-08-23 PROCEDURE — 99214 OFFICE O/P EST MOD 30 MIN: CPT | Mod: 95,,, | Performed by: STUDENT IN AN ORGANIZED HEALTH CARE EDUCATION/TRAINING PROGRAM

## 2022-08-23 RX ORDER — LORAZEPAM 0.5 MG/1
0.5 TABLET ORAL EVERY 6 HOURS PRN
Qty: 15 TABLET | Refills: 0 | Status: SHIPPED | OUTPATIENT
Start: 2022-08-23 | End: 2023-05-09

## 2022-08-23 RX ORDER — HYDROXYZINE HYDROCHLORIDE 10 MG/1
TABLET, FILM COATED ORAL
Qty: 30 TABLET | Refills: 3 | Status: SHIPPED | OUTPATIENT
Start: 2022-08-23 | End: 2022-12-15

## 2022-08-24 ENCOUNTER — PATIENT MESSAGE (OUTPATIENT)
Dept: INTERNAL MEDICINE | Facility: CLINIC | Age: 32
End: 2022-08-24
Payer: COMMERCIAL

## 2022-08-25 ENCOUNTER — TELEPHONE (OUTPATIENT)
Dept: INTERNAL MEDICINE | Facility: CLINIC | Age: 32
End: 2022-08-25
Payer: COMMERCIAL

## 2022-08-26 ENCOUNTER — PATIENT MESSAGE (OUTPATIENT)
Dept: INTERNAL MEDICINE | Facility: CLINIC | Age: 32
End: 2022-08-26
Payer: COMMERCIAL

## 2022-08-29 ENCOUNTER — PATIENT MESSAGE (OUTPATIENT)
Dept: PSYCHIATRY | Facility: OTHER | Age: 32
End: 2022-08-29
Payer: COMMERCIAL

## 2022-08-29 DIAGNOSIS — F41.9 ANXIETY: Primary | ICD-10-CM

## 2022-08-31 ENCOUNTER — PATIENT MESSAGE (OUTPATIENT)
Dept: PSYCHIATRY | Facility: OTHER | Age: 32
End: 2022-08-31
Payer: COMMERCIAL

## 2022-09-21 ENCOUNTER — OFFICE VISIT (OUTPATIENT)
Dept: PSYCHIATRY | Facility: OTHER | Age: 32
End: 2022-09-21
Payer: COMMERCIAL

## 2022-09-21 DIAGNOSIS — F41.9 ANXIETY: Primary | ICD-10-CM

## 2022-09-21 PROCEDURE — 90791 PSYCH DIAGNOSTIC EVALUATION: CPT | Mod: 95,,, | Performed by: SOCIAL WORKER

## 2022-09-21 PROCEDURE — 90791 PR PSYCHIATRIC DIAGNOSTIC EVALUATION: ICD-10-PCS | Mod: 95,,, | Performed by: SOCIAL WORKER

## 2022-09-21 NOTE — PROGRESS NOTES
".The patient location is: Home ([facility])  The chief complaint leading to consultation is: Initial Eval    Visit type: audiovisual    Face to Face time with patient: 60 mins  75 minutes of total time spent on the encounter, which includes face to face time and non-face to face time preparing to see the patient (eg, review of tests), Obtaining and/or reviewing separately obtained history, Documenting clinical information in the electronic or other health record, Independently interpreting results (not separately reported) and communicating results to the patient/family/caregiver, or Care coordination (not separately reported).         Each patient to whom he or she provides medical services by telemedicine is:  (1) informed of the relationship between the physician and patient and the respective role of any other health care provider with respect to management of the patient; and (2) notified that he or she may decline to receive medical services by telemedicine and may withdraw from such care at any time.    Type of service: Individual    [facility] - [facility] ([place])  Psychosocial Assessment      Date: 9/21/2022  Time: 9:38 AM    Name: Yadira Rodrigues    Chief Complaint: Panic attacks    Referred by: Dr. Uribe    History of Present Illness: "I've had high anxiety episodes before and it seems like they're coming back"    Medical History:   Past Medical History:   Diagnosis Date    Abnormal Pap smear of cervix        No past surgical history on file.    Family History   Problem Relation Age of Onset    Hypertension Father     Hyperlipidemia Father     Prostate cancer Father         s/p resection    Hypertension Mother     Mental illness Sister     Mental illness Maternal Grandmother     Mental illness Paternal Grandmother     Cancer Maternal Uncle         lung?    Colon cancer Neg Hx     Breast cancer Neg Hx     Ovarian cancer Neg Hx        Social History     Socioeconomic History    Marital status:    Tobacco " "Use    Smoking status: Never    Smokeless tobacco: Never   Substance and Sexual Activity    Alcohol use: Not Currently     Alcohol/week: 4.0 standard drinks     Types: 4 Standard drinks or equivalent per week     Comment: drinks socially, occasionally     Drug use: Never    Sexual activity: Yes     Partners: Male     Birth control/protection: Spermicide       Current Outpatient Medications   Medication Sig Dispense Refill    b complex vitamins tablet Take 1 tablet by mouth once daily. Takes over the counter, per patient.      ergocalciferol (ERGOCALCIFEROL) 50,000 unit Cap Take 50,000 Units by mouth once daily.      hydrOXYzine HCL (ATARAX) 10 MG Tab Take 1-2 tabs as needed up to three times a day. Can cause drowsiness. 30 tablet 3    LORazepam (ATIVAN) 0.5 MG tablet Take 1 tablet (0.5 mg total) by mouth every 6 (six) hours as needed for Anxiety. 15 tablet 0    propranoloL (INDERAL) 10 MG tablet Take 10-20 mg as needed for performance anxiety.  Take 1/2-1 hour before situations. 20 tablet 1    tretinoin (RETIN-A) 0.025 % cream Apply 1 application topically nightly.      zolpidem (AMBIEN) 5 MG Tab Take 1 tablet (5 mg total) by mouth nightly as needed. 30 tablet 2     No current facility-administered medications for this visit.       Review of patient's allergies indicates:   Allergen Reactions    Codeine Nausea And Vomiting       Family History: (mental illness, substance abuse, relationships, etc.)    Paternal: Dad lives in . Notes PGM had schizophrenia, she had ECT and that was helpful. Notes Dad's mental health is good, is retired now and is struggling with that change. "We're not close but I like him"  Maternal: Mom lives in , notes "we clash, we can't figure her out, she's definitely got something not right, she's very narcissistic, lies a lot" "There's a lot of bad mental illness in my family" "I just don't like to be around her because I always have to put on an act".   Siblings: 1 sister, she has anxiety " "also. Is very close with her sister. Notes sister was recently dxed as being on the spectrum.   Children: No children     Psychiatric History/Previous Substance Abuse TX (incluse response to past substance abuse tx): Pt states she had anxiety as a child "I was highly strung". Notes she had to be to school early every day, would get very anxious about not getting to school early enough. Notes recently she had a panic attack on a plane. Has rx of Lorazepam from Dr. Uribe. Notes a lot of times anxiety is brought on by not being able to leave a situation. Notes with her menstrual cycle there are days she is "way more down and lethargic than other days".     Past Psychiatric History:   Therapy, outpt. 4 years ago in grad school she had a spell of anxiety and saw a therapist. Found it helpful. Also had anxiety after grad school when she was in [facility] "both sets of anxiety was about lack of control. Notes when she was in [place] she would often have intrusive thoughts about jumping in front of a train, was worried about not having control.     Is pt currently in treatment with a therapist or psychiatrist? No, will f/u with this writer     Hinduism/Spiritual Orientation:Unknown    Sexual/Physical Abuse (indicate if patient is abuser or the abused): Denied, notes "My Mom wasn't great, but she never physically assaulted me but there were a lot of scary situations, she used to really scare us".     Sexual Orientation and History:  to  a few months ago, notes she had what she thinks was a panic attack at her rehearsal dinner. 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 Notes the balance between teaching and research is very stressful. 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  works the same job as her, they do most of " "their research together. Is not teaching this semester, will start teaching next semester, goes in 2 days per week.     Legal Issues: None    Financial Status: Finances not a stressor for them.     Social, Peer Group, Living Situation, and Living Environment: Moved here [place] ago from [place], moved for 's job at [employer]. Notes  is biggest support, notes she is close with in-laws. Doesn't have a lot of friends here, notes "it's kind of hard to make friends when you don't have kids".     Leisure and Recreation Activities: Likes to watch non-stressful tv shows. Likes to go out for dinner and go out for walks.     Nutrition: Notes she pays a lot of attention to what she eats, does predominantly whole food plant based eating. Is trying to cook more.     Sleep: Takes Ambien if she is traveling overnight, or takes it prn if she is stressed about work. Takes something to help her sleep about 1x/month. Notes she is in bed for about 8 hours but notes it takes an hour to get to sleep.     Exercise: Notes "it comes and goes. It's the first thing to go when I'm busy", notes she walks every day, notes she gets heart rate up and/or lifts weights 1x/week.     Stressors:Occupational Concerns    Substance Use History: (include age of onset, duration, intensity, patterns of use, relapse history, and consequences of use for each substance): Notes she used to drink very heavily "I'd get blackout", notes that triggered anxiety about not being in control of her behavior. Managed to get her drinking under control.     Any Other Addictive Behaviors: Denies    Motivation for change:  yes    Impressions: Pt is a 32 year old female who presents for therapy to help address anxiety. Pt will benefit from supportive counseling and CBT.     Clinical diagnosis/priority: Anxiety  Psychosocial/cultural factors: Pt new at [employer], on tenure track, will start teaching next semester  Current level of functioning: Moderate        "

## 2022-10-10 ENCOUNTER — PATIENT MESSAGE (OUTPATIENT)
Dept: INTERNAL MEDICINE | Facility: CLINIC | Age: 32
End: 2022-10-10
Payer: COMMERCIAL

## 2022-10-26 ENCOUNTER — TELEPHONE (OUTPATIENT)
Dept: PSYCHIATRY | Facility: OTHER | Age: 32
End: 2022-10-26
Payer: COMMERCIAL

## 2022-10-26 NOTE — TELEPHONE ENCOUNTER
Contacted Ms. Rodrigues in regards to her appointment tomorrow with Polly Aguirre LCSW. There was no answer, left detailed message that we require updated Insurance information in order to proceed with her scheduled visit tomorrow or if she would like to be self-pay we can get her in touch with the fee for service department at [phone], however appointment has been cancelled.     Also sent portal message.

## 2022-10-27 ENCOUNTER — PATIENT MESSAGE (OUTPATIENT)
Dept: SPINE | Facility: CLINIC | Age: 32
End: 2022-10-27
Payer: COMMERCIAL

## 2022-11-01 ENCOUNTER — OFFICE VISIT (OUTPATIENT)
Dept: OBSTETRICS AND GYNECOLOGY | Facility: CLINIC | Age: 32
End: 2022-11-01
Payer: COMMERCIAL

## 2022-11-01 VITALS
BODY MASS INDEX: 17.2 KG/M2 | SYSTOLIC BLOOD PRESSURE: 112 MMHG | WEIGHT: 120.13 LBS | HEIGHT: 70 IN | DIASTOLIC BLOOD PRESSURE: 64 MMHG

## 2022-11-01 DIAGNOSIS — Z01.419 WELL WOMAN EXAM WITH ROUTINE GYNECOLOGICAL EXAM: Primary | ICD-10-CM

## 2022-11-01 PROCEDURE — 99395 PREV VISIT EST AGE 18-39: CPT | Mod: S$GLB,,, | Performed by: OBSTETRICS & GYNECOLOGY

## 2022-11-01 PROCEDURE — 99999 PR PBB SHADOW E&M-EST. PATIENT-LVL III: CPT | Mod: PBBFAC,,, | Performed by: OBSTETRICS & GYNECOLOGY

## 2022-11-01 PROCEDURE — 99999 PR PBB SHADOW E&M-EST. PATIENT-LVL III: ICD-10-PCS | Mod: PBBFAC,,, | Performed by: OBSTETRICS & GYNECOLOGY

## 2022-11-01 PROCEDURE — 99395 PR PREVENTIVE VISIT,EST,18-39: ICD-10-PCS | Mod: S$GLB,,, | Performed by: OBSTETRICS & GYNECOLOGY

## 2022-11-01 NOTE — PROGRESS NOTES
History & Physical  Gynecology      SUBJECTIVE:     Chief Complaint: Well Woman (One past abnorm pap- 10 years ago. Every pap since then has been normal )       History of Present Illness:  Annual Exam-Premenopausal  Patient presents for annual exam. She has no complaints today. Menstrual cycles are monthly.  She is sexually active. GYN screening history: last pap: approximate date 10/2021 paphpv (in care everywhere) and was normal. She participates in regular exercise: yes.  Smoking status:  no    Contraception: none, coitus interuptus    FH:  Breast cancer: neg  Colon cancer: neg  Ovarian cancer: neg    Review of patient's allergies indicates:   Allergen Reactions    Codeine Nausea And Vomiting       Past Medical History:   Diagnosis Date    Abnormal Pap smear of cervix      History reviewed. No pertinent surgical history.  OB History          1    Para        Term                AB   1    Living             SAB        IAB        Ectopic        Multiple        Live Births                   Family History   Problem Relation Age of Onset    Hypertension Father     Hyperlipidemia Father     Prostate cancer Father         s/p resection    Hypertension Mother     Mental illness Sister     Mental illness Maternal Grandmother     Mental illness Paternal Grandmother     Cancer Maternal Uncle         lung?    Colon cancer Neg Hx     Breast cancer Neg Hx     Ovarian cancer Neg Hx      Social History     Tobacco Use    Smoking status: Never    Smokeless tobacco: Never   Substance Use Topics    Alcohol use: Not Currently     Alcohol/week: 4.0 standard drinks     Types: 4 Standard drinks or equivalent per week     Comment: drinks socially, occasionally     Drug use: Never       Current Outpatient Medications   Medication Sig    b complex vitamins tablet Take 1 tablet by mouth once daily. Takes over the counter, per patient.    ergocalciferol (ERGOCALCIFEROL) 50,000 unit Cap Take 50,000 Units by mouth once daily.     hydrOXYzine HCL (ATARAX) 10 MG Tab Take 1-2 tabs as needed up to three times a day. Can cause drowsiness.    LORazepam (ATIVAN) 0.5 MG tablet Take 1 tablet (0.5 mg total) by mouth every 6 (six) hours as needed for Anxiety.    propranoloL (INDERAL) 10 MG tablet Take 10-20 mg as needed for performance anxiety.  Take 1/2-1 hour before situations.    tretinoin (RETIN-A) 0.025 % cream Apply 1 application topically nightly.    zolpidem (AMBIEN) 5 MG Tab Take 1 tablet (5 mg total) by mouth nightly as needed.     No current facility-administered medications for this visit.         Review of Systems:  Review of Systems   Constitutional:  Negative for appetite change, fever and unexpected weight change.   Respiratory:  Negative for shortness of breath.    Cardiovascular:  Negative for chest pain.   Gastrointestinal:  Negative for nausea and vomiting.   Genitourinary:  Negative for menorrhagia, menstrual problem, pelvic pain, vaginal bleeding, vaginal discharge and vaginal pain.   Integumentary:  Negative for breast mass.   Breast: Negative for lump and mass     OBJECTIVE:     Physical Exam:  Physical Exam  Vitals and nursing note reviewed.   Constitutional:       Appearance: She is well-developed.   Cardiovascular:      Rate and Rhythm: Normal rate and regular rhythm.      Heart sounds: Normal heart sounds.   Pulmonary:      Effort: Pulmonary effort is normal.      Breath sounds: Normal breath sounds.   Chest:   Breasts:     Breasts are symmetrical.      Right: No inverted nipple, mass, nipple discharge, skin change or tenderness.      Left: No inverted nipple, mass, nipple discharge, skin change or tenderness.   Abdominal:      Palpations: Abdomen is soft.   Genitourinary:     General: Normal vulva.      Labia:         Right: No rash, tenderness, lesion or injury.         Left: No rash, tenderness, lesion or injury.       Urethra: No prolapse, urethral pain, urethral swelling or urethral lesion.      Vagina: Normal. No  signs of injury and foreign body. No vaginal discharge, erythema, tenderness or bleeding.      Cervix: No cervical motion tenderness, discharge or friability.      Uterus: Not deviated, not enlarged, not fixed and not tender.       Adnexa:         Right: No mass, tenderness or fullness.          Left: No mass, tenderness or fullness.        Rectum: No anal fissure or external hemorrhoid.      Comments: Urethral meatus: normal size, anterior vaginal wall with no prolapse, no lesions  Bladder: no fullness, masses or tenderness  Musculoskeletal:      Cervical back: Normal range of motion.   Neurological:      Mental Status: She is alert and oriented to person, place, and time.   Psychiatric:         Behavior: Behavior normal.         Thought Content: Thought content normal.         Judgment: Judgment normal.       Chaperoned by: Lynnette    ASSESSMENT:       ICD-10-CM ICD-9-CM    1. Well woman exam with routine gynecological exam  Z01.419 V72.31              Plan:      Yadira was seen today for well woman.    Diagnoses and all orders for this visit:    Well woman exam with routine gynecological exam      No orders of the defined types were placed in this encounter.      Well Woman:  - Pap smear and hpv completed (care everywhere)  - Birth control: none, recommended pnv  - GC/CT:n/a  - Mammogram: due age 40  - Smoking cessation: n/a  - Labs: none required  - Vaccines: none required  - Exercise counseling      Follow up in  one year for annual or prn.    Mary Grace Benitez

## 2022-11-03 ENCOUNTER — PATIENT MESSAGE (OUTPATIENT)
Dept: OBSTETRICS AND GYNECOLOGY | Facility: CLINIC | Age: 32
End: 2022-11-03
Payer: COMMERCIAL

## 2022-11-07 ENCOUNTER — OFFICE VISIT (OUTPATIENT)
Dept: PSYCHIATRY | Facility: OTHER | Age: 32
End: 2022-11-07
Payer: COMMERCIAL

## 2022-11-07 DIAGNOSIS — F41.9 ANXIETY: Primary | ICD-10-CM

## 2022-11-07 PROCEDURE — 90837 PSYTX W PT 60 MINUTES: CPT | Mod: 95,,, | Performed by: SOCIAL WORKER

## 2022-11-07 PROCEDURE — 90837 PR PSYCHOTHERAPY W/PATIENT, 60 MIN: ICD-10-PCS | Mod: 95,,, | Performed by: SOCIAL WORKER

## 2022-11-07 NOTE — PROGRESS NOTES
"The patient location is: Home ([facility])  The chief complaint leading to consultation is: f/u anxiety    Visit type: audiovisual    Face to Face time with patient: 50 mins  60 minutes of total time spent on the encounter, which includes face to face time and non-face to face time preparing to see the patient (eg, review of tests), Obtaining and/or reviewing separately obtained history, Documenting clinical information in the electronic or other health record, Independently interpreting results (not separately reported) and communicating results to the patient/family/caregiver, or Care coordination (not separately reported).         Each patient to whom he or she provides medical services by telemedicine is:  (1) informed of the relationship between the physician and patient and the respective role of any other health care provider with respect to management of the patient; and (2) notified that he or she may decline to receive medical services by telemedicine and may withdraw from such care at any time.    Type of service: Individual    Content of session: Pt notes she has felt better overall, but notes she has had a few episodes of panic. Notes this happens "when I feel like I'm in something and I can't leave, then I get in my head". Notes distraction helps during that time. Was in a car with in laws and felt really hot and needed to cool down. Notes, "it's just minor but I'm worried about what it can become". Notes "I've seen mental health in my family and what it's become for them and I don't want that to happen for me", notes (Paternal) grandmother, mother and sister all struggle with their mental health and some agorophobia, notes grandmother got "very stressed out" leaving the house. Notes she had "shock therapy" at 50, and another one at 83. Notes her ssiter was fine until 2 years ago, couldn't get out of bed, "complete panic mode", was in that state for about 2 weeks. Notes if she is very busy at work, "I'm " "stressed but I'm not anxious", notes she was anxious when her sister was going through her own anxiety. 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 Notes her Dad's mental health is ok, but notes "he seems a bit sad" since his intermediate. Pt notes she doesn't have many friends but is introverted and feels ok with that, but notes she is unsure why it's hard to make friends, whether it's [place] or her age group. Discussed that expanding her friend group may help her feel less anxious if she has more things to occupy her time. Overall, pt coping well. Will f/u in 3 weeks.      Therapeutic techniques and approaches: behavior modification and supportive counseling. Rationale: appropriate for presenting issues.     Pt denies SI/HI. Mood was euthymic, affect matches verbal content. AAOx3, participated fully in session.     Time spent in counselinmins       "

## 2022-12-15 ENCOUNTER — OFFICE VISIT (OUTPATIENT)
Dept: INTERNAL MEDICINE | Facility: CLINIC | Age: 32
End: 2022-12-15
Payer: COMMERCIAL

## 2022-12-15 VITALS
OXYGEN SATURATION: 100 % | HEART RATE: 94 BPM | HEIGHT: 70 IN | WEIGHT: 123.44 LBS | DIASTOLIC BLOOD PRESSURE: 78 MMHG | BODY MASS INDEX: 17.67 KG/M2 | SYSTOLIC BLOOD PRESSURE: 118 MMHG

## 2022-12-15 DIAGNOSIS — Z86.2 HISTORY OF ANEMIA: ICD-10-CM

## 2022-12-15 DIAGNOSIS — Z78.9 CONSUMES A VEGAN DIET: ICD-10-CM

## 2022-12-15 DIAGNOSIS — F41.8 SITUATIONAL ANXIETY: Primary | ICD-10-CM

## 2022-12-15 DIAGNOSIS — R63.5 WEIGHT GAIN: ICD-10-CM

## 2022-12-15 DIAGNOSIS — R82.90 MALODOROUS URINE: ICD-10-CM

## 2022-12-15 PROCEDURE — 99999 PR PBB SHADOW E&M-EST. PATIENT-LVL III: CPT | Mod: PBBFAC,,, | Performed by: STUDENT IN AN ORGANIZED HEALTH CARE EDUCATION/TRAINING PROGRAM

## 2022-12-15 PROCEDURE — 3074F PR MOST RECENT SYSTOLIC BLOOD PRESSURE < 130 MM HG: ICD-10-PCS | Mod: CPTII,S$GLB,, | Performed by: STUDENT IN AN ORGANIZED HEALTH CARE EDUCATION/TRAINING PROGRAM

## 2022-12-15 PROCEDURE — 99999 PR PBB SHADOW E&M-EST. PATIENT-LVL III: ICD-10-PCS | Mod: PBBFAC,,, | Performed by: STUDENT IN AN ORGANIZED HEALTH CARE EDUCATION/TRAINING PROGRAM

## 2022-12-15 PROCEDURE — 3074F SYST BP LT 130 MM HG: CPT | Mod: CPTII,S$GLB,, | Performed by: STUDENT IN AN ORGANIZED HEALTH CARE EDUCATION/TRAINING PROGRAM

## 2022-12-15 PROCEDURE — 99213 OFFICE O/P EST LOW 20 MIN: CPT | Mod: S$GLB,,, | Performed by: STUDENT IN AN ORGANIZED HEALTH CARE EDUCATION/TRAINING PROGRAM

## 2022-12-15 PROCEDURE — 3008F PR BODY MASS INDEX (BMI) DOCUMENTED: ICD-10-PCS | Mod: CPTII,S$GLB,, | Performed by: STUDENT IN AN ORGANIZED HEALTH CARE EDUCATION/TRAINING PROGRAM

## 2022-12-15 PROCEDURE — 1159F MED LIST DOCD IN RCRD: CPT | Mod: CPTII,S$GLB,, | Performed by: STUDENT IN AN ORGANIZED HEALTH CARE EDUCATION/TRAINING PROGRAM

## 2022-12-15 PROCEDURE — 3078F PR MOST RECENT DIASTOLIC BLOOD PRESSURE < 80 MM HG: ICD-10-PCS | Mod: CPTII,S$GLB,, | Performed by: STUDENT IN AN ORGANIZED HEALTH CARE EDUCATION/TRAINING PROGRAM

## 2022-12-15 PROCEDURE — 99213 PR OFFICE/OUTPT VISIT, EST, LEVL III, 20-29 MIN: ICD-10-PCS | Mod: S$GLB,,, | Performed by: STUDENT IN AN ORGANIZED HEALTH CARE EDUCATION/TRAINING PROGRAM

## 2022-12-15 PROCEDURE — 3078F DIAST BP <80 MM HG: CPT | Mod: CPTII,S$GLB,, | Performed by: STUDENT IN AN ORGANIZED HEALTH CARE EDUCATION/TRAINING PROGRAM

## 2022-12-15 PROCEDURE — 1159F PR MEDICATION LIST DOCUMENTED IN MEDICAL RECORD: ICD-10-PCS | Mod: CPTII,S$GLB,, | Performed by: STUDENT IN AN ORGANIZED HEALTH CARE EDUCATION/TRAINING PROGRAM

## 2022-12-15 PROCEDURE — 3008F BODY MASS INDEX DOCD: CPT | Mod: CPTII,S$GLB,, | Performed by: STUDENT IN AN ORGANIZED HEALTH CARE EDUCATION/TRAINING PROGRAM

## 2023-01-19 ENCOUNTER — PATIENT MESSAGE (OUTPATIENT)
Dept: INTERNAL MEDICINE | Facility: CLINIC | Age: 33
End: 2023-01-19
Payer: COMMERCIAL

## 2023-02-08 ENCOUNTER — PATIENT MESSAGE (OUTPATIENT)
Dept: OBSTETRICS AND GYNECOLOGY | Facility: CLINIC | Age: 33
End: 2023-02-08
Payer: COMMERCIAL

## 2023-02-09 ENCOUNTER — TELEPHONE (OUTPATIENT)
Dept: OBSTETRICS AND GYNECOLOGY | Facility: CLINIC | Age: 33
End: 2023-02-09
Payer: COMMERCIAL

## 2023-02-15 ENCOUNTER — TELEPHONE (OUTPATIENT)
Dept: OBSTETRICS AND GYNECOLOGY | Facility: CLINIC | Age: 33
End: 2023-02-15
Payer: COMMERCIAL

## 2023-02-15 NOTE — TELEPHONE ENCOUNTER
----- Message from Alvaro Hollis sent at 2/15/2023 12:27 PM CST -----  Regarding: Letter  Contact: ALAN TUTTLE [[id number]]  Name of Who is Calling: ALAN TUTTLE [[id number]]      What is the request in detail: Would like to speak with staff in regards to having the letter placed in patient portal or e-mail directly to her at [email or web address]. Please advise      Can the clinic reply by [facility]:  yes      What Number to Call Back [facility] not [facility]: [phone]

## 2023-02-17 ENCOUNTER — OFFICE VISIT (OUTPATIENT)
Dept: PSYCHIATRY | Facility: OTHER | Age: 33
End: 2023-02-17
Payer: COMMERCIAL

## 2023-02-17 DIAGNOSIS — F41.9 ANXIETY: ICD-10-CM

## 2023-02-17 PROCEDURE — 90837 PSYTX W PT 60 MINUTES: CPT | Mod: 95,,, | Performed by: SOCIAL WORKER

## 2023-02-17 PROCEDURE — 90837 PR PSYCHOTHERAPY W/PATIENT, 60 MIN: ICD-10-PCS | Mod: 95,,, | Performed by: SOCIAL WORKER

## 2023-02-17 NOTE — PROGRESS NOTES
"The patient location is: Home ([facility])  The chief complaint leading to consultation is: f/u anxiety    Visit type: audiovisual    Face to Face time with patient: 50  60 minutes of total time spent on the encounter, which includes face to face time and non-face to face time preparing to see the patient (eg, review of tests), Obtaining and/or reviewing separately obtained history, Documenting clinical information in the electronic or other health record, Independently interpreting results (not separately reported) and communicating results to the patient/family/caregiver, or Care coordination (not separately reported).         Each patient to whom he or she provides medical services by telemedicine is:  (1) informed of the relationship between the physician and patient and the respective role of any other health care provider with respect to management of the patient; and (2) notified that he or she may decline to receive medical services by telemedicine and may withdraw from such care at any time.    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 Has started listening to audio books before sleep. In the evenings "I would get nauseous and got and that feeling like I was about to lose control, like I couldn't control myself". 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 Notes sister is on SSRI and has found that helpful. 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 Pt states she has been going out more, enjoying [employer], relationship is good, research is slow but steady. Pt states she worries about getting sick, has a lot of health anxiety. Discussed coping skills, " will f/u in 3 weeks.      Therapeutic techniques and approaches: behavior modification and supportive counseling. Rationale: appropriate for presenting issues.     Pt denies SI/HI. Mood was euthymic, affect matches verbal content. AAOx3, participated fully in session.     Time spent in counselinmins

## 2023-03-29 ENCOUNTER — PATIENT MESSAGE (OUTPATIENT)
Dept: PSYCHIATRY | Facility: OTHER | Age: 33
End: 2023-03-29
Payer: COMMERCIAL

## 2023-04-05 ENCOUNTER — OFFICE VISIT (OUTPATIENT)
Dept: PSYCHIATRY | Facility: CLINIC | Age: 33
End: 2023-04-05
Payer: COMMERCIAL

## 2023-04-05 VITALS
WEIGHT: 120.13 LBS | SYSTOLIC BLOOD PRESSURE: 123 MMHG | HEART RATE: 102 BPM | DIASTOLIC BLOOD PRESSURE: 78 MMHG | BODY MASS INDEX: 17.24 KG/M2

## 2023-04-05 DIAGNOSIS — K58.0 IRRITABLE BOWEL SYNDROME WITH DIARRHEA: ICD-10-CM

## 2023-04-05 DIAGNOSIS — F41.8 PERFORMANCE ANXIETY: ICD-10-CM

## 2023-04-05 DIAGNOSIS — F41.1 GENERALIZED ANXIETY DISORDER: ICD-10-CM

## 2023-04-05 DIAGNOSIS — F41.9 ANXIETY: Primary | ICD-10-CM

## 2023-04-05 PROCEDURE — 3008F BODY MASS INDEX DOCD: CPT | Mod: CPTII,S$GLB,, | Performed by: STUDENT IN AN ORGANIZED HEALTH CARE EDUCATION/TRAINING PROGRAM

## 2023-04-05 PROCEDURE — 90792 PR PSYCHIATRIC DIAGNOSTIC EVALUATION W/MEDICAL SERVICES: ICD-10-PCS | Mod: S$GLB,,, | Performed by: STUDENT IN AN ORGANIZED HEALTH CARE EDUCATION/TRAINING PROGRAM

## 2023-04-05 PROCEDURE — 99999 PR PBB SHADOW E&M-EST. PATIENT-LVL II: CPT | Mod: PBBFAC,,, | Performed by: STUDENT IN AN ORGANIZED HEALTH CARE EDUCATION/TRAINING PROGRAM

## 2023-04-05 PROCEDURE — 3074F PR MOST RECENT SYSTOLIC BLOOD PRESSURE < 130 MM HG: ICD-10-PCS | Mod: CPTII,S$GLB,, | Performed by: STUDENT IN AN ORGANIZED HEALTH CARE EDUCATION/TRAINING PROGRAM

## 2023-04-05 PROCEDURE — 90792 PSYCH DIAG EVAL W/MED SRVCS: CPT | Mod: S$GLB,,, | Performed by: STUDENT IN AN ORGANIZED HEALTH CARE EDUCATION/TRAINING PROGRAM

## 2023-04-05 PROCEDURE — 3008F PR BODY MASS INDEX (BMI) DOCUMENTED: ICD-10-PCS | Mod: CPTII,S$GLB,, | Performed by: STUDENT IN AN ORGANIZED HEALTH CARE EDUCATION/TRAINING PROGRAM

## 2023-04-05 PROCEDURE — 3078F PR MOST RECENT DIASTOLIC BLOOD PRESSURE < 80 MM HG: ICD-10-PCS | Mod: CPTII,S$GLB,, | Performed by: STUDENT IN AN ORGANIZED HEALTH CARE EDUCATION/TRAINING PROGRAM

## 2023-04-05 PROCEDURE — 3078F DIAST BP <80 MM HG: CPT | Mod: CPTII,S$GLB,, | Performed by: STUDENT IN AN ORGANIZED HEALTH CARE EDUCATION/TRAINING PROGRAM

## 2023-04-05 PROCEDURE — 99999 PR PBB SHADOW E&M-EST. PATIENT-LVL II: ICD-10-PCS | Mod: PBBFAC,,, | Performed by: STUDENT IN AN ORGANIZED HEALTH CARE EDUCATION/TRAINING PROGRAM

## 2023-04-05 PROCEDURE — 3074F SYST BP LT 130 MM HG: CPT | Mod: CPTII,S$GLB,, | Performed by: STUDENT IN AN ORGANIZED HEALTH CARE EDUCATION/TRAINING PROGRAM

## 2023-04-05 RX ORDER — PROPRANOLOL HYDROCHLORIDE 10 MG/1
10 TABLET ORAL 2 TIMES DAILY
Qty: 60 TABLET | Refills: 11 | Status: SHIPPED | OUTPATIENT
Start: 2023-04-05 | End: 2023-05-01

## 2023-04-05 NOTE — PROGRESS NOTES
"      [facility]  DEPARTMENT OF PSYCHIATRY    INITIAL PSYCHIATRIC EVALUATION  Outpatient Clinic    EXAMINING PRACTITIONER: Fanta FUNEZ MD      KEY:     I[]I Y = II[x][]II = Yes / Present / Present Though Denies / Endorses  I[]I N = II[][x]II = No / Absent / Absent Though Endorses / Denies  I[]I U = II[][]II = Unknown / Unable to Assess/Enact / Unwilling to Participate/Provide  I[]I A = II[x][x]II = Ambiguity / Uncertainty of Accuracy Exists  I[]I D = Denial or Minimization is Suspected/Evident  I[]I N/A = Non-Applicable      CHIEF COMPLAINT:     Patient Name: Yadira Rodrigues  YOB: 1990  MRN: [id number]    Yadira Rodrigues is a 32 y.o. female who is being seen by me for an initial psychiatric evaluation.  Yadira Rodrigues presents with the chief complaint anxiety, establishing care     CHART REVIEW:     Available documentation has been reviewed, and pertinent elements of the chart have been incorporated into this evaluation where appropriate.    reviewed      PRESENTATION:     HISTORY OF PRESENT ILLNESS:     In Summary:  Ms. Rodrigues is a 31 y/o F who presents for establishment of anxiety. Pt states she has always been anxious, but it has gotten worse as she has gotten older. Noticed in particular her anxiety being terrible around her wedding, June 2022. Has had nausea, difficulty breathing.   Now, her biggest worries are related to a sense of claustrophobia. She has a phobia of elevators, planes. Has had panic attacks. Has had episodes where she has had panic attacks during driving.   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 She has been having diarrhea, related to IBS. She has it mostly when she is presenting/teaching. She hates public speaking and has performance anxiety. She has always had diarrhea like symptoms before presenting, but now is just super anxious about teaching.     Has taken lorazepam for anxiety attacks. Has tried it a few times. She says "maybe it works." "     She does have a therapist who is helpful. She has never tried other medications for anxiety. Sister is on an SSRI- not sure what she takes.     They are considering trying for a baby. She is not taking birth control now.                .  REVIEW OF SYSTEMS:     A pertinent medical review of systems was performed.    Review of Systems    II[][x]II pain: denies  II[][x]II cardiac symptoms: denies  II[][x]II abnormal movements: denies    PSYCHIATRIC ROS:     I[]I Patient denies any pertinent Psychiatric ROS, and none is known.  I[]I Patient unable or unwilling to provide any Psychiatric ROS.    II[x][]II sleep disturbance: she does have issues with sleep, she is a light sleeper. Has restless sleep.   II[x][]II appetite/weight change: had lost some weight due to nausea   II[][x]II fatigue/anergia: denies  II[][x]II impairment in focus/concentration: denies    II[][x]II depression: denies  II[x][]II anxiety/worry:  Positive for: more anxiety than the average person, more worry than the average person, excessive generalized anxiety, excessive generalized worry, panic attacks   II[][x]II dysregulated mood/behavior: denies  II[][x]II manic symptomatology: denies  II[][x]II psychosis: denies      HISTORY:     I[x]I Y  I[]I N  I[]I U  Psychiatric Diagnoses: phobia, generalized anxiety  I[]I Y  I[x]I N  I[]I U  Current Psychiatric Provider (if Applicable):   I[]I Y  I[x]I N  I[]I U  Hx of Psychiatric Hospitalization:   I[x]I Y  I[]I N  I[]I U  Hx of Outpatient Psychiatric Treatment (psychiatry/psychotherapy): therapy  I[x]I Y  I[]I N  I[]I U  Psychotropic Trials: lorazepam   I[]I Y  I[x]I N  I[]I U  Prior Suicide Attempts:   I[]I Y  I[x]I N  I[]I U  Hx of Suicidal Ideation:   I[]I Y  I[x]I N  I[]I U  Hx of Homicidal Ideation:   I[]I Y  I[x]I N  I[]I U  Hx of Self-Injurious Behavior (Non-Suicidal):   I[]I Y  I[x]I N  I[]I U  Hx of Violence:   I[]I Y  I[x]I N  I[]I U  Documented Hx of Malingering:     Additional Relevant  Past Psychiatric History, As Applicable:       SUBSTANCE USE:   Used to drink a lot, had episodes of binge drinking. Did not need rehab/detox. Now she barely drinks.   No nicotine, no cannabis, no other drugs.     TRAUMA:     I[]I Patient denies any history of trauma, abuse or neglect, and none is known.  I[]I Patient unable or unwilling to provide any history of trauma, abuse, or neglect.    I[]I Y  I[x]I N  I[]I U  Hx of Trauma/Neglect:   I[]I Y  I[x]I N  I[]I U  Hx of Physical Abuse:   I[]I Y  I[x]I N  I[]I U  Hx of Sexual Abuse:       FAMILY:     Sister- anxiety       SOCIAL:     I[]I Patient unable or unwilling to provide any social history.    II[][x]II Grew Up Locally?: [facility]  II[x][]II Happy Childhood?:   II[x][]II Significant Developmental Delay/Disability?:   II[x][]II GED/High School Dipoloma?:   II[x][]II Post High School Education?:   II[x][]II Currently Employed?:   II[][x]II On or Applying for Disability?:   II[x][]II Functions Independently?:   II[x][]II Financially Stable?:   II[x][]II Domiciled?:   II[x][]II Lives Alone?:   II[x][]II Heterosexual/Cisgender?:   II[x][]II Currently in a Romantic Relationship?:   II[x][]II Ever ?:   II[][x]II Children/Dependents?:   II[x][]II Engaged in Hobbies/Recreational Activities?:   II[][x]II Access to a Gun?: denies      LEGAL:     I[x]I Patient denies any legal history, and none is known.  I[]I Patient unable or unwilling to provide any legal history.    I[]I Y  I[x]I N  I[]I U  Current Legal Issues:   I[]I Y  I[x]I N  I[]I U  Past Charges/Convictions:   I[]I Y  I[x]I N  I[]I U  Hx of Incarceration:       MEDICAL:     The patient's past medical history has been reviewed and updated as appropriate within the electronic medical record system.    General Medical History:     has Consumes a vegan diet; History of anemia; and Situational anxiety on their problem list.     NEUROLOGIC:     I[]I Y  I[x]I N  I[]I U  Hx of Seizure:   I[]I Y  I[x]I N  I[]I U   Hx of Significant Head Trauma (e.g., Loss of Consciousness, Concussion, Coma):        MEDICATIONS:       Scheduled and PRN Medications:   The electronic chart was reviewed and updated as appropriate.  See [facility] for details.  has a current medication list which includes the following prescription(s): b complex vitamins, ergocalciferol, lorazepam, tretinoin, and zolpidem.   Clindamycin, prenatal vitamins     ALLERGIES:   Codeine    RISK:     RELIABILITY, ACCURACY & AGENCY:     The patient is deemed to be a reliable and factually accurate historian.    Inconsistencies between patient reporting, collateral information, and/or chart review are absent.    Legal Status: The patient is currently here on a voluntary legal status.    MITIGATION:     Risk Mitigation Strategies, Harm Reduction Techniques, and Safety Netting are important interventions that can reduce acute and chronic risk, and as such opportunities were sought to incorporate them into the encounter, to the degree possible.  Written material has been provided to supplement, augment, and reinforce any discussions and interventions, via the AVS or other pre-printed handouts.    PRESCRIPTION DRUG MONITORING:     LA/MS  [place]  Site reviewed - No recent discrepancies or irregularities are noted.      FIREARMS:     Access to Firearms:   See above for screening on access to firearms.  NOTE: patient counseled on gun safety.  NOTE: patient counseled on increased risks associated with gun ownership.      III[x]III  RISK PARAMETERS:     The following risk parameters were assessed during this evaluation:    Denies SI, HI, AVH self harm.     III[x]III  CLINICAL RISK ASSESSMENT:      The patient was able to satisfactorily contract for safety and was noted to be future oriented.  Protective factors were identified.     [id number]: Currently does not meet or exceed the threshold for psychiatric hospitalization, as the patient can be managed safely and  successfully in a less restrictive level of care or the community.    III[x]III  CLINICAL RISK DETERMINATION:     Current risk is judged to be:  I[x]I Low    I[]I Moderate   I[]I High    The following criteria were met for involuntary psychiatric admission:   I[x]I None    I[]I Dangerous to Self    I[]I Dangerous to Others    I[]I Gravely Disabled      EXAMINATION:     VITALS:     /78   Pulse 102   Wt 54.5 kg (120 lb 2.4 oz)   BMI 17.24 kg/m²     MENTAL STATUS EXAMINATION:     Mental Status Exam:  Appearance: unremarkable, age appropriate  Behavior/Cooperation: appropriate normal, cooperative  Speech: appropriate rate, volume and tone normal tone, normal rate, normal pitch, normal volume  Language: uses words appropriately; NO aphasia or dysarthria  Mood: steady  Affect:  congruent with mood and appropriate to situation/content   Thought Process: normal and logical  Thought Content: normal, no suicidality, no homicidality, delusions, or paranoia  Level of Consciousness: Alert and Oriented x3  Memory:  Intact  Attention/concentration: appropriate for age/education.   Fund of Knowledge: appears adequate  Insight: Intact  Judgment: Intact      ASSESSMENT:     A diagnostic psychiatric evaluation was performed and responsiveness to treatment was assessed.  The patient demonstrates adequate ability/capacity to respond to treatment.      III[x]III  INITIAL DIAGNOSES AND PROBLEMS:             .  Performance anxiety  Specific phobia- claustrophobia   Generalized anxiety disorder    PLAN:     IMPRESSION AND RECOMMENDATIONS:     MANAGEMENT PLAN, TREATMENT GOALS, THERAPEUTIC TECHNIQUES/APPROACHES & CLINICAL REASONING:     Performance anxiety   - initiate propanolol 10 mg as needed prior to teaching. Take 30 minutes before teaching, take up to 2 mg for total of 20 mg. (Reviewed hypotension, other risks/benefits)     Specific phobia, BAKARI  - continue therapy. Pt reluctant to start daily medication- IE SSRI v TCA at this  time as she is trying for a baby. Discussed limitations of data on safety in pregnancy extensively with patient, who is fairly risk averse. Discussed nortryptiline, mirtazipine.     RTC 4 weeks  Reviewed ED precautions  No SI, HI, AVH     .  III[x]III  PRESCRIPTION DRUG MANAGEMENT:     Prescription Drug Management entails the review, recommendation, or consideration without recommendation of medications, and as such was employed during the encounter.    Discussed, to the extent possible, diagnosis, risks and benefits of proposed treatment vs alternative treatments vs no treatment, potential side effects of these treatments and the inherent unpredictability of treatment. The patient expresses understanding of the above and displays the capacity to agree with this treatment given said understanding. Patient also agrees that, currently, the benefits outweigh the risks and consents to treatment at this time.     Written material has additionally been provided, via the AVS or other pre-printed handouts.        MEDICAL DECISION MAKING:     Shared medical decision making and informed consent are the hallmark and bedrock of good clinical care, and as such have been employed and obtained, respectively, to the degree possible.  Written material has been provided to supplement, augment, and reinforce any discussions and interventions, via the AVS or other pre-printed handouts.    Additional psychoeducation has been provided, as warranted.       Fanta Harden MD  Department of Psychiatry  [facility]

## 2023-04-17 ENCOUNTER — PATIENT MESSAGE (OUTPATIENT)
Dept: OBSTETRICS AND GYNECOLOGY | Facility: CLINIC | Age: 33
End: 2023-04-17
Payer: COMMERCIAL

## 2023-04-17 ENCOUNTER — CLINICAL SUPPORT (OUTPATIENT)
Dept: OBSTETRICS AND GYNECOLOGY | Facility: CLINIC | Age: 33
End: 2023-04-17
Payer: COMMERCIAL

## 2023-04-17 DIAGNOSIS — N91.2 AMENORRHEA: Primary | ICD-10-CM

## 2023-04-19 ENCOUNTER — TELEPHONE (OUTPATIENT)
Dept: PSYCHIATRY | Facility: CLINIC | Age: 33
End: 2023-04-19
Payer: COMMERCIAL

## 2023-04-19 ENCOUNTER — PATIENT MESSAGE (OUTPATIENT)
Dept: PSYCHIATRY | Facility: CLINIC | Age: 33
End: 2023-04-19
Payer: COMMERCIAL

## 2023-04-26 ENCOUNTER — TELEPHONE (OUTPATIENT)
Dept: OBSTETRICS AND GYNECOLOGY | Facility: CLINIC | Age: 33
End: 2023-04-26
Payer: COMMERCIAL

## 2023-04-26 ENCOUNTER — PATIENT MESSAGE (OUTPATIENT)
Dept: PSYCHIATRY | Facility: CLINIC | Age: 33
End: 2023-04-26
Payer: COMMERCIAL

## 2023-04-26 DIAGNOSIS — N91.2 AMENORRHEA: Primary | ICD-10-CM

## 2023-04-26 NOTE — ADDENDUM NOTE
Addended by: JEREMY SILVER on: 4/26/2023 04:08 PM     Modules accepted: Orders     Will rescreen before discharge

## 2023-04-27 ENCOUNTER — LAB VISIT (OUTPATIENT)
Dept: LAB | Facility: OTHER | Age: 33
End: 2023-04-27
Attending: OBSTETRICS & GYNECOLOGY
Payer: COMMERCIAL

## 2023-04-27 DIAGNOSIS — N91.2 AMENORRHEA: ICD-10-CM

## 2023-04-27 LAB
ABO + RH BLD: NORMAL
BASOPHILS # BLD AUTO: 0.04 K/UL (ref 0–0.2)
BASOPHILS NFR BLD: 0.7 % (ref 0–1.9)
BLD GP AB SCN CELLS X3 SERPL QL: NORMAL
DIFFERENTIAL METHOD: ABNORMAL
EOSINOPHIL # BLD AUTO: 0.1 K/UL (ref 0–0.5)
EOSINOPHIL NFR BLD: 1.6 % (ref 0–8)
ERYTHROCYTE [DISTWIDTH] IN BLOOD BY AUTOMATED COUNT: 11.9 % (ref 11.5–14.5)
HBV SURFACE AG SERPL QL IA: NORMAL
HCT VFR BLD AUTO: 36.5 % (ref 37–48.5)
HCV AB SERPL QL IA: NORMAL
HGB BLD-MCNC: 12.3 G/DL (ref 12–16)
HIV 1+2 AB+HIV1 P24 AG SERPL QL IA: NORMAL
IMM GRANULOCYTES # BLD AUTO: 0.02 K/UL (ref 0–0.04)
IMM GRANULOCYTES NFR BLD AUTO: 0.4 % (ref 0–0.5)
LYMPHOCYTES # BLD AUTO: 1.9 K/UL (ref 1–4.8)
LYMPHOCYTES NFR BLD: 35 % (ref 18–48)
MCH RBC QN AUTO: 30.5 PG (ref 27–31)
MCHC RBC AUTO-ENTMCNC: 33.7 G/DL (ref 32–36)
MCV RBC AUTO: 91 FL (ref 82–98)
MONOCYTES # BLD AUTO: 0.4 K/UL (ref 0.3–1)
MONOCYTES NFR BLD: 6.8 % (ref 4–15)
NEUTROPHILS # BLD AUTO: 3 K/UL (ref 1.8–7.7)
NEUTROPHILS NFR BLD: 55.5 % (ref 38–73)
NRBC BLD-RTO: 0 /100 WBC
PLATELET # BLD AUTO: 208 K/UL (ref 150–450)
PMV BLD AUTO: 10.2 FL (ref 9.2–12.9)
RBC # BLD AUTO: 4.03 M/UL (ref 4–5.4)
SPECIMEN OUTDATE: NORMAL
WBC # BLD AUTO: 5.48 K/UL (ref 3.9–12.7)

## 2023-04-27 PROCEDURE — 36415 COLL VENOUS BLD VENIPUNCTURE: CPT | Performed by: OBSTETRICS & GYNECOLOGY

## 2023-04-27 PROCEDURE — 85025 COMPLETE CBC W/AUTO DIFF WBC: CPT | Performed by: OBSTETRICS & GYNECOLOGY

## 2023-04-27 PROCEDURE — 87340 HEPATITIS B SURFACE AG IA: CPT | Performed by: OBSTETRICS & GYNECOLOGY

## 2023-04-27 PROCEDURE — 86900 BLOOD TYPING SEROLOGIC ABO: CPT | Performed by: OBSTETRICS & GYNECOLOGY

## 2023-04-27 PROCEDURE — 87389 HIV-1 AG W/HIV-1&-2 AB AG IA: CPT | Performed by: OBSTETRICS & GYNECOLOGY

## 2023-04-27 PROCEDURE — 86803 HEPATITIS C AB TEST: CPT | Performed by: OBSTETRICS & GYNECOLOGY

## 2023-04-27 PROCEDURE — 86762 RUBELLA ANTIBODY: CPT | Performed by: OBSTETRICS & GYNECOLOGY

## 2023-04-27 PROCEDURE — 86592 SYPHILIS TEST NON-TREP QUAL: CPT | Performed by: OBSTETRICS & GYNECOLOGY

## 2023-04-28 LAB
RPR SER QL: NORMAL
RUBV IGG SER-ACNC: 32.2 IU/ML
RUBV IGG SER-IMP: REACTIVE

## 2023-05-01 RX ORDER — PROPRANOLOL HYDROCHLORIDE 10 MG/1
TABLET ORAL
Qty: 180 TABLET | Refills: 4 | Status: SHIPPED | OUTPATIENT
Start: 2023-05-01 | End: 2023-06-28

## 2023-05-03 ENCOUNTER — PATIENT MESSAGE (OUTPATIENT)
Dept: OBSTETRICS AND GYNECOLOGY | Facility: CLINIC | Age: 33
End: 2023-05-03
Payer: COMMERCIAL

## 2023-05-03 DIAGNOSIS — O21.9 NAUSEA/VOMITING IN PREGNANCY: Primary | ICD-10-CM

## 2023-05-09 ENCOUNTER — PATIENT MESSAGE (OUTPATIENT)
Dept: OBSTETRICS AND GYNECOLOGY | Facility: CLINIC | Age: 33
End: 2023-05-09
Payer: COMMERCIAL

## 2023-05-09 RX ORDER — ONDANSETRON 4 MG/1
4 TABLET, ORALLY DISINTEGRATING ORAL EVERY 6 HOURS PRN
Qty: 30 TABLET | Refills: 0 | Status: SHIPPED | OUTPATIENT
Start: 2023-05-09 | End: 2023-05-16 | Stop reason: SDUPTHER

## 2023-05-16 ENCOUNTER — PATIENT MESSAGE (OUTPATIENT)
Dept: OBSTETRICS AND GYNECOLOGY | Facility: CLINIC | Age: 33
End: 2023-05-16

## 2023-05-16 ENCOUNTER — OFFICE VISIT (OUTPATIENT)
Dept: OBSTETRICS AND GYNECOLOGY | Facility: CLINIC | Age: 33
End: 2023-05-16
Payer: COMMERCIAL

## 2023-05-16 ENCOUNTER — HOSPITAL ENCOUNTER (OUTPATIENT)
Dept: PERINATAL CARE | Facility: OTHER | Age: 33
Discharge: HOME | End: 2023-05-16
Attending: OBSTETRICS & GYNECOLOGY
Payer: COMMERCIAL

## 2023-05-16 VITALS — DIASTOLIC BLOOD PRESSURE: 68 MMHG | SYSTOLIC BLOOD PRESSURE: 99 MMHG | BODY MASS INDEX: 17.21 KG/M2 | WEIGHT: 119.94 LBS

## 2023-05-16 DIAGNOSIS — N91.2 AMENORRHEA: ICD-10-CM

## 2023-05-16 DIAGNOSIS — O21.9 NAUSEA/VOMITING IN PREGNANCY: ICD-10-CM

## 2023-05-16 DIAGNOSIS — N91.2 AMENORRHEA: Primary | ICD-10-CM

## 2023-05-16 DIAGNOSIS — K59.00 CONSTIPATION, UNSPECIFIED CONSTIPATION TYPE: ICD-10-CM

## 2023-05-16 LAB
B-HCG UR QL: POSITIVE
CTP QC/QA: YES

## 2023-05-16 PROCEDURE — 76801 US OB/GYN PROCEDURE (VIEWPOINT): ICD-10-PCS | Mod: 26,,, | Performed by: OBSTETRICS & GYNECOLOGY

## 2023-05-16 PROCEDURE — 3074F SYST BP LT 130 MM HG: CPT | Mod: CPTII,S$GLB,, | Performed by: OBSTETRICS & GYNECOLOGY

## 2023-05-16 PROCEDURE — 87591 N.GONORRHOEAE DNA AMP PROB: CPT | Performed by: OBSTETRICS & GYNECOLOGY

## 2023-05-16 PROCEDURE — 3008F PR BODY MASS INDEX (BMI) DOCUMENTED: ICD-10-PCS | Mod: CPTII,S$GLB,, | Performed by: OBSTETRICS & GYNECOLOGY

## 2023-05-16 PROCEDURE — 3008F BODY MASS INDEX DOCD: CPT | Mod: CPTII,S$GLB,, | Performed by: OBSTETRICS & GYNECOLOGY

## 2023-05-16 PROCEDURE — 76801 OB US < 14 WKS SINGLE FETUS: CPT

## 2023-05-16 PROCEDURE — 76801 OB US < 14 WKS SINGLE FETUS: CPT | Mod: 26,,, | Performed by: OBSTETRICS & GYNECOLOGY

## 2023-05-16 PROCEDURE — 1159F MED LIST DOCD IN RCRD: CPT | Mod: CPTII,S$GLB,, | Performed by: OBSTETRICS & GYNECOLOGY

## 2023-05-16 PROCEDURE — 99999 PR PBB SHADOW E&M-EST. PATIENT-LVL III: ICD-10-PCS | Mod: PBBFAC,,, | Performed by: OBSTETRICS & GYNECOLOGY

## 2023-05-16 PROCEDURE — 3078F PR MOST RECENT DIASTOLIC BLOOD PRESSURE < 80 MM HG: ICD-10-PCS | Mod: CPTII,S$GLB,, | Performed by: OBSTETRICS & GYNECOLOGY

## 2023-05-16 PROCEDURE — 3074F PR MOST RECENT SYSTOLIC BLOOD PRESSURE < 130 MM HG: ICD-10-PCS | Mod: CPTII,S$GLB,, | Performed by: OBSTETRICS & GYNECOLOGY

## 2023-05-16 PROCEDURE — 81025 URINE PREGNANCY TEST: CPT | Mod: S$GLB,,, | Performed by: OBSTETRICS & GYNECOLOGY

## 2023-05-16 PROCEDURE — 3078F DIAST BP <80 MM HG: CPT | Mod: CPTII,S$GLB,, | Performed by: OBSTETRICS & GYNECOLOGY

## 2023-05-16 PROCEDURE — 1159F PR MEDICATION LIST DOCUMENTED IN MEDICAL RECORD: ICD-10-PCS | Mod: CPTII,S$GLB,, | Performed by: OBSTETRICS & GYNECOLOGY

## 2023-05-16 PROCEDURE — 81025 POCT URINE PREGNANCY: ICD-10-PCS | Mod: S$GLB,,, | Performed by: OBSTETRICS & GYNECOLOGY

## 2023-05-16 PROCEDURE — 99214 OFFICE O/P EST MOD 30 MIN: CPT | Mod: S$GLB,,, | Performed by: OBSTETRICS & GYNECOLOGY

## 2023-05-16 PROCEDURE — 99999 PR PBB SHADOW E&M-EST. PATIENT-LVL III: CPT | Mod: PBBFAC,,, | Performed by: OBSTETRICS & GYNECOLOGY

## 2023-05-16 PROCEDURE — 99214 PR OFFICE/OUTPT VISIT, EST, LEVL IV, 30-39 MIN: ICD-10-PCS | Mod: S$GLB,,, | Performed by: OBSTETRICS & GYNECOLOGY

## 2023-05-16 RX ORDER — DOCUSATE SODIUM 100 MG/1
100 CAPSULE, LIQUID FILLED ORAL 2 TIMES DAILY
Qty: 60 CAPSULE | Refills: 1 | Status: SHIPPED | OUTPATIENT
Start: 2023-05-16 | End: 2023-10-31

## 2023-05-16 RX ORDER — METOCLOPRAMIDE 10 MG/1
10 TABLET ORAL
Qty: 30 TABLET | Refills: 3 | Status: SHIPPED | OUTPATIENT
Start: 2023-05-16 | End: 2023-10-31

## 2023-05-16 RX ORDER — ONDANSETRON 4 MG/1
4 TABLET, ORALLY DISINTEGRATING ORAL EVERY 6 HOURS PRN
Qty: 30 TABLET | Refills: 3 | Status: SHIPPED | OUTPATIENT
Start: 2023-05-16 | End: 2023-09-13

## 2023-05-16 NOTE — PROGRESS NOTES
New OB    SUBJECTIVE:     Chief Complaint: Possible Pregnancy       History of Present Illness:  Pt is a 32 y.o. female who presents complaining of amenorrhea.  She is 8w5d based on her LMP.  She does not have vaginal bleeding/spotting, does not have abdominal pain,  does have nausea, does have vomiting- for the first time today.  She has not lost weight.  She is using unisom and B6.  Zofran once per day which has helped as well.  She is struggling with constipation.     Significant History:   Anxiety    Last pap: 10/2021    Review of patient's allergies indicates:   Allergen Reactions    Codeine Nausea And Vomiting       Past Medical History:   Diagnosis Date    Abnormal Pap smear of cervix      History reviewed. No pertinent surgical history.  OB History          2    Para        Term                AB   1    Living             SAB        IAB        Ectopic        Multiple        Live Births                   Family History   Problem Relation Age of Onset    Hypertension Father     Hyperlipidemia Father     Prostate cancer Father         s/p resection    Hypertension Mother     Mental illness Sister     Mental illness Maternal Grandmother     Mental illness Paternal Grandmother     Cancer Maternal Uncle         lung?    Colon cancer Neg Hx     Breast cancer Neg Hx     Ovarian cancer Neg Hx      Social History     Tobacco Use    Smoking status: Never    Smokeless tobacco: Never   Substance Use Topics    Alcohol use: Not Currently     Alcohol/week: 4.0 standard drinks     Types: 4 Standard drinks or equivalent per week     Comment: drinks socially, occasionally     Drug use: Never       Current Outpatient Medications   Medication Sig    b complex vitamins tablet Take 1 tablet by mouth once daily. Takes over the counter, per patient.    ergocalciferol (ERGOCALCIFEROL) 50,000 unit Cap Take 50,000 Units by mouth once daily.    docusate sodium (COLACE) 100 MG capsule Take 1 capsule (100 mg total) by mouth  2 (two) times daily.    metoclopramide HCl (REGLAN) 10 MG tablet Take 1 tablet (10 mg total) by mouth 3 (three) times daily with meals.    ondansetron (ZOFRAN-ODT) 4 MG TbDL Take 1 tablet (4 mg total) by mouth every 6 (six) hours as needed.    propranoloL (INDERAL) 10 MG tablet TAKE 1 TABLET BY MOUTH TWICE A DAY (Patient not taking: Reported on 5/16/2023)     No current facility-administered medications for this visit.       Review of Systems:  Review of Systems   Constitutional:  Negative for appetite change, fever and unexpected weight change.   Respiratory:  Negative for shortness of breath.    Cardiovascular:  Negative for chest pain.   Gastrointestinal:  Positive for constipation, nausea and vomiting. Negative for diarrhea.   Genitourinary:  Negative for menorrhagia, menstrual problem, pelvic pain, vaginal bleeding, vaginal discharge and vaginal pain.      OBJECTIVE:     Physical Exam:  Physical Exam  Vitals and nursing note reviewed.   Constitutional:       Appearance: She is well-developed.   Pulmonary:      Effort: Pulmonary effort is normal.   Skin:     Coloration: Skin is not pale.   Neurological:      Mental Status: She is oriented to person, place, and time.   Psychiatric:         Behavior: Behavior normal.         Thought Content: Thought content normal.         Judgment: Judgment normal.     Recent WWE normal.       ASSESSMENT:       ICD-10-CM ICD-9-CM    1. Amenorrhea  N91.2 626.0 POCT urine pregnancy      C. trachomatis/N. gonorrhoeae by AMP DNA      2. Nausea/vomiting in pregnancy  O21.9 643.90 ondansetron (ZOFRAN-ODT) 4 MG TbDL      metoclopramide HCl (REGLAN) 10 MG tablet      3. Constipation, unspecified constipation type  K59.00 564.00 docusate sodium (COLACE) 100 MG capsule             Plan:      Yadira was seen today for possible pregnancy.    Diagnoses and all orders for this visit:    Amenorrhea  -     POCT urine pregnancy  -     C. trachomatis/N. gonorrhoeae by AMP DNA    Nausea/vomiting in  pregnancy  -     ondansetron (ZOFRAN-ODT) 4 MG TbDL; Take 1 tablet (4 mg total) by mouth every 6 (six) hours as needed.  -     metoclopramide HCl (REGLAN) 10 MG tablet; Take 1 tablet (10 mg total) by mouth 3 (three) times daily with meals.    Constipation, unspecified constipation type  -     docusate sodium (COLACE) 100 MG capsule; Take 1 capsule (100 mg total) by mouth 2 (two) times daily.        Orders Placed This Encounter   Procedures    C. trachomatis/N. gonorrhoeae by AMP DNA    POCT urine pregnancy       Secondary Amenorrhea:  - UPT: positive in the clinic  - Dating U/S: today  - First trimester screening: discussed options in detail; patient will be in [place] from 9-14 weeks.  Counseled on non invasive vs invasive testing; reviewed risks/benefits of each; will plan on NIPT to be done in [place] (patient brought forms); MSAFP to be done later in pregnancy  - First trimester labs: completed    Cyrus/Vom:  - taking zofran, B6 and unisom; Reglan added; counseled to eat small meals, BRAT diet    Vegan:   - consider referral to nutritionist    Counseling:   - Avoid alcohol, tobacco and drug use in pregnancy  - Take 800 mcg of folic acid with a prenatal vitamin  - Avoid advil, aleve and aspirin (unless advised otherwise)  - Warm up deli meat in the microwave  - Avoid unpasteurized cheese  - Avoid raw fish and large fish with high mercury levels (shark, swordfish, tile fish, genevieve mackerel)  - Limit albacore tuna to <6 oz per week  - Avoid changing obi litter  - Continue to exercise (do what is normal for you)  - [place] is safe (unless advised otherwise)  - Caffeine is safe up to 200 mg a day in the first trimester  - Weight gain:     BMI 18-25:     25-35 lb    BMI 25-29:     15-25 lb    BMI >30:        11-20 lb      RTC in one month    Mary Grace Benitez

## 2023-05-17 LAB
C TRACH DNA SPEC QL NAA+PROBE: NOT DETECTED
N GONORRHOEA DNA SPEC QL NAA+PROBE: NOT DETECTED

## 2023-06-14 ENCOUNTER — PATIENT MESSAGE (OUTPATIENT)
Dept: OBSTETRICS AND GYNECOLOGY | Facility: CLINIC | Age: 33
End: 2023-06-14
Payer: COMMERCIAL

## 2023-06-28 ENCOUNTER — PATIENT MESSAGE (OUTPATIENT)
Dept: OBSTETRICS AND GYNECOLOGY | Facility: CLINIC | Age: 33
End: 2023-06-28

## 2023-06-28 ENCOUNTER — INITIAL PRENATAL (OUTPATIENT)
Dept: OBSTETRICS AND GYNECOLOGY | Facility: CLINIC | Age: 33
End: 2023-06-28
Payer: COMMERCIAL

## 2023-06-28 VITALS — BODY MASS INDEX: 17.94 KG/M2 | SYSTOLIC BLOOD PRESSURE: 102 MMHG | WEIGHT: 125 LBS | DIASTOLIC BLOOD PRESSURE: 68 MMHG

## 2023-06-28 DIAGNOSIS — Z34.02 ENCOUNTER FOR SUPERVISION OF NORMAL FIRST PREGNANCY IN SECOND TRIMESTER: Primary | ICD-10-CM

## 2023-06-28 PROCEDURE — 99999 PR PBB SHADOW E&M-EST. PATIENT-LVL III: CPT | Mod: PBBFAC,,, | Performed by: OBSTETRICS & GYNECOLOGY

## 2023-06-28 PROCEDURE — 0502F PR SUBSEQUENT PRENATAL CARE: ICD-10-PCS | Mod: CPTII,S$GLB,, | Performed by: OBSTETRICS & GYNECOLOGY

## 2023-06-28 PROCEDURE — 0502F SUBSEQUENT PRENATAL CARE: CPT | Mod: CPTII,S$GLB,, | Performed by: OBSTETRICS & GYNECOLOGY

## 2023-06-28 PROCEDURE — 99999 PR PBB SHADOW E&M-EST. PATIENT-LVL III: ICD-10-PCS | Mod: PBBFAC,,, | Performed by: OBSTETRICS & GYNECOLOGY

## 2023-06-28 NOTE — PROGRESS NOTES
No bleeding or cramping.  Patient still struggling with nausea.  Takes zofran 4 mg daily, half unisom at night, and colace.  If she doesn't take the zofran.    MSAFP to be done tomorrow.  m scan ordered. Signed up for connected mom.    NIPT in patient's [facility] reviewed. Nuchal translucency done in [place] and results reviewed, scan to chart.

## 2023-06-30 ENCOUNTER — LAB VISIT (OUTPATIENT)
Dept: LAB | Facility: OTHER | Age: 33
End: 2023-06-30
Attending: OBSTETRICS & GYNECOLOGY
Payer: COMMERCIAL

## 2023-06-30 DIAGNOSIS — Z34.02 ENCOUNTER FOR SUPERVISION OF NORMAL FIRST PREGNANCY IN SECOND TRIMESTER: ICD-10-CM

## 2023-06-30 PROCEDURE — 82105 ALPHA-FETOPROTEIN SERUM: CPT | Performed by: OBSTETRICS & GYNECOLOGY

## 2023-06-30 PROCEDURE — 36415 COLL VENOUS BLD VENIPUNCTURE: CPT | Performed by: OBSTETRICS & GYNECOLOGY

## 2023-07-05 LAB
# FETUSES US: NORMAL
AFP INTERPRETATION: NORMAL
AFP MOM SERPL: 0.76
AFP SERPL-MCNC: 26.3 NG/ML
AFP SERPL-MCNC: NEGATIVE NG/ML
AGE AT DELIVERY: 33
GA (DAYS): 1 D
GA (WEEKS): 15 WK
GESTATIONAL AGE METHOD: NORMAL
IDDM PATIENT QL: NORMAL
SMOKING STATUS FTND: NO

## 2023-07-06 ENCOUNTER — PATIENT MESSAGE (OUTPATIENT)
Dept: OTHER | Facility: OTHER | Age: 33
End: 2023-07-06
Payer: COMMERCIAL

## 2023-07-13 ENCOUNTER — PATIENT MESSAGE (OUTPATIENT)
Dept: OTHER | Facility: OTHER | Age: 33
End: 2023-07-13
Payer: COMMERCIAL

## 2023-07-21 ENCOUNTER — PATIENT MESSAGE (OUTPATIENT)
Dept: PSYCHIATRY | Facility: CLINIC | Age: 33
End: 2023-07-21
Payer: COMMERCIAL

## 2023-07-30 ENCOUNTER — OFFICE VISIT (OUTPATIENT)
Dept: URGENT CARE | Facility: CLINIC | Age: 33
End: 2023-07-30
Payer: COMMERCIAL

## 2023-07-30 ENCOUNTER — HOSPITAL ENCOUNTER (EMERGENCY)
Facility: OTHER | Age: 33
Discharge: HOME | End: 2023-07-30
Attending: OBSTETRICS & GYNECOLOGY
Payer: COMMERCIAL

## 2023-07-30 ENCOUNTER — PATIENT MESSAGE (OUTPATIENT)
Dept: OBSTETRICS AND GYNECOLOGY | Facility: CLINIC | Age: 33
End: 2023-07-30
Payer: COMMERCIAL

## 2023-07-30 VITALS
HEART RATE: 92 BPM | RESPIRATION RATE: 16 BRPM | WEIGHT: 128 LBS | SYSTOLIC BLOOD PRESSURE: 117 MMHG | HEIGHT: 70 IN | BODY MASS INDEX: 18.32 KG/M2 | DIASTOLIC BLOOD PRESSURE: 76 MMHG | OXYGEN SATURATION: 99 % | TEMPERATURE: 99 F

## 2023-07-30 VITALS
DIASTOLIC BLOOD PRESSURE: 68 MMHG | SYSTOLIC BLOOD PRESSURE: 116 MMHG | RESPIRATION RATE: 16 BRPM | OXYGEN SATURATION: 100 % | TEMPERATURE: 99 F | HEART RATE: 84 BPM

## 2023-07-30 DIAGNOSIS — R51.9 PREGNANCY HEADACHE IN SECOND TRIMESTER: Primary | ICD-10-CM

## 2023-07-30 DIAGNOSIS — O26.892 PREGNANCY HEADACHE IN SECOND TRIMESTER: Primary | ICD-10-CM

## 2023-07-30 DIAGNOSIS — R05.1 ACUTE COUGH: ICD-10-CM

## 2023-07-30 DIAGNOSIS — Z3A.19 19 WEEKS GESTATION OF PREGNANCY: Primary | ICD-10-CM

## 2023-07-30 DIAGNOSIS — R11.2 NAUSEA AND VOMITING, UNSPECIFIED VOMITING TYPE: ICD-10-CM

## 2023-07-30 DIAGNOSIS — J11.1 INFLUENZA: ICD-10-CM

## 2023-07-30 LAB
ALBUMIN SERPL BCP-MCNC: 3.4 G/DL (ref 3.5–5.2)
ALP SERPL-CCNC: 46 U/L (ref 55–135)
ALT SERPL W/O P-5'-P-CCNC: 15 U/L (ref 10–44)
ANION GAP SERPL CALC-SCNC: 8 MMOL/L (ref 8–16)
AST SERPL-CCNC: 18 U/L (ref 10–40)
BASOPHILS # BLD AUTO: 0.03 K/UL (ref 0–0.2)
BASOPHILS NFR BLD: 0.4 % (ref 0–1.9)
BILIRUB SERPL-MCNC: 0.8 MG/DL (ref 0.1–1)
BILIRUB SERPL-MCNC: NEGATIVE MG/DL
BILIRUB UR QL STRIP: NEGATIVE
BLOOD URINE, POC: NEGATIVE
BUN SERPL-MCNC: 5 MG/DL (ref 6–20)
CALCIUM SERPL-MCNC: 8.9 MG/DL (ref 8.7–10.5)
CHLORIDE SERPL-SCNC: 104 MMOL/L (ref 95–110)
CO2 SERPL-SCNC: 19 MMOL/L (ref 23–29)
COLOR, POC UA: YELLOW
CREAT SERPL-MCNC: 0.6 MG/DL (ref 0.5–1.4)
CTP QC/QA: YES
DIFFERENTIAL METHOD: ABNORMAL
EOSINOPHIL # BLD AUTO: 0 K/UL (ref 0–0.5)
EOSINOPHIL NFR BLD: 0.3 % (ref 0–8)
ERYTHROCYTE [DISTWIDTH] IN BLOOD BY AUTOMATED COUNT: 13.2 % (ref 11.5–14.5)
EST. GFR  (NO RACE VARIABLE): >60 ML/MIN/1.73 M^2
GLUCOSE SERPL-MCNC: 87 MG/DL (ref 70–110)
GLUCOSE UR QL STRIP: NEGATIVE
GLUCOSE UR QL STRIP: NORMAL
HCT VFR BLD AUTO: 32 % (ref 37–48.5)
HGB BLD-MCNC: 11.2 G/DL (ref 12–16)
IMM GRANULOCYTES # BLD AUTO: 0.04 K/UL (ref 0–0.04)
IMM GRANULOCYTES NFR BLD AUTO: 0.5 % (ref 0–0.5)
INFLUENZA A, MOLECULAR: POSITIVE
INFLUENZA B, MOLECULAR: NEGATIVE
KETONES UR QL STRIP: ABNORMAL
KETONES UR QL STRIP: POSITIVE
LEUKOCYTE ESTERASE UR QL STRIP: NEGATIVE
LEUKOCYTE ESTERASE URINE, POC: NEGATIVE
LYMPHOCYTES # BLD AUTO: 0.4 K/UL (ref 1–4.8)
LYMPHOCYTES NFR BLD: 5.4 % (ref 18–48)
MAGNESIUM SERPL-MCNC: 1.7 MG/DL (ref 1.6–2.6)
MCH RBC QN AUTO: 31.8 PG (ref 27–31)
MCHC RBC AUTO-ENTMCNC: 35 G/DL (ref 32–36)
MCV RBC AUTO: 91 FL (ref 82–98)
MONOCYTES # BLD AUTO: 0.4 K/UL (ref 0.3–1)
MONOCYTES NFR BLD: 5.5 % (ref 4–15)
NEUTROPHILS # BLD AUTO: 6.6 K/UL (ref 1.8–7.7)
NEUTROPHILS NFR BLD: 87.9 % (ref 38–73)
NITRITE, POC UA: NEGATIVE
NRBC BLD-RTO: 0 /100 WBC
PH, POC UA: 7.5
PH, POC UA: 8
PHOSPHATE SERPL-MCNC: 3.5 MG/DL (ref 2.7–4.5)
PLATELET # BLD AUTO: 151 K/UL (ref 150–450)
PMV BLD AUTO: 10.1 FL (ref 9.2–12.9)
POC BLOOD, URINE: NEGATIVE
POC NITRATES, URINE: NEGATIVE
POTASSIUM SERPL-SCNC: 3.8 MMOL/L (ref 3.5–5.1)
PROT SERPL-MCNC: 6.2 G/DL (ref 6–8.4)
PROT UR QL STRIP: NEGATIVE
PROTEIN, POC: NEGATIVE
RBC # BLD AUTO: 3.52 M/UL (ref 4–5.4)
SARS-COV-2 AG RESP QL IA.RAPID: NEGATIVE
SODIUM SERPL-SCNC: 131 MMOL/L (ref 136–145)
SP GR UR STRIP: 1.01 (ref 1–1.03)
SPECIFIC GRAVITY, POC UA: 1
SPECIMEN SOURCE: ABNORMAL
UROBILINOGEN UR STRIP-ACNC: NORMAL (ref 0.1–1.1)
UROBILINOGEN, POC UA: NORMAL
WBC # BLD AUTO: 7.46 K/UL (ref 3.9–12.7)

## 2023-07-30 PROCEDURE — 83735 ASSAY OF MAGNESIUM: CPT

## 2023-07-30 PROCEDURE — 99284 PR EMERGENCY DEPT VISIT,LEVEL IV: ICD-10-PCS | Mod: ,,, | Performed by: OBSTETRICS & GYNECOLOGY

## 2023-07-30 PROCEDURE — 85025 COMPLETE CBC W/AUTO DIFF WBC: CPT

## 2023-07-30 PROCEDURE — 99213 OFFICE O/P EST LOW 20 MIN: CPT | Mod: S$GLB,,, | Performed by: NURSE PRACTITIONER

## 2023-07-30 PROCEDURE — 63600175 PHARM REV CODE 636 W HCPCS

## 2023-07-30 PROCEDURE — 87811 SARS-COV-2 COVID19 W/OPTIC: CPT | Mod: QW,S$GLB,, | Performed by: NURSE PRACTITIONER

## 2023-07-30 PROCEDURE — 81003 POCT URINALYSIS, DIPSTICK, AUTOMATED, W/O SCOPE: ICD-10-PCS | Mod: QW,S$GLB,, | Performed by: NURSE PRACTITIONER

## 2023-07-30 PROCEDURE — 96375 TX/PRO/DX INJ NEW DRUG ADDON: CPT

## 2023-07-30 PROCEDURE — 25000003 PHARM REV CODE 250

## 2023-07-30 PROCEDURE — 87811 SARS CORONAVIRUS 2 ANTIGEN POCT, MANUAL READ: ICD-10-PCS | Mod: QW,S$GLB,, | Performed by: NURSE PRACTITIONER

## 2023-07-30 PROCEDURE — 99284 EMERGENCY DEPT VISIT MOD MDM: CPT

## 2023-07-30 PROCEDURE — 84100 ASSAY OF PHOSPHORUS: CPT

## 2023-07-30 PROCEDURE — 81003 URINALYSIS AUTO W/O SCOPE: CPT | Mod: QW,S$GLB,, | Performed by: NURSE PRACTITIONER

## 2023-07-30 PROCEDURE — 80053 COMPREHEN METABOLIC PANEL: CPT

## 2023-07-30 PROCEDURE — 81002 URINALYSIS NONAUTO W/O SCOPE: CPT

## 2023-07-30 PROCEDURE — 99284 EMERGENCY DEPT VISIT MOD MDM: CPT | Mod: ,,, | Performed by: OBSTETRICS & GYNECOLOGY

## 2023-07-30 PROCEDURE — 99213 PR OFFICE/OUTPT VISIT, EST, LEVL III, 20-29 MIN: ICD-10-PCS | Mod: S$GLB,,, | Performed by: NURSE PRACTITIONER

## 2023-07-30 PROCEDURE — 96374 THER/PROPH/DIAG INJ IV PUSH: CPT

## 2023-07-30 PROCEDURE — 87502 INFLUENZA DNA AMP PROBE: CPT | Performed by: OBSTETRICS & GYNECOLOGY

## 2023-07-30 RX ORDER — OSELTAMIVIR PHOSPHATE 75 MG/1
75 CAPSULE ORAL 2 TIMES DAILY
Status: DISCONTINUED | OUTPATIENT
Start: 2023-07-30 | End: 2023-07-30 | Stop reason: HOSPADM

## 2023-07-30 RX ORDER — ONDANSETRON HYDROCHLORIDE 4 MG/5ML
4 SOLUTION ORAL ONCE
Status: DISCONTINUED | OUTPATIENT
Start: 2023-07-30 | End: 2023-07-30

## 2023-07-30 RX ORDER — PROCHLORPERAZINE EDISYLATE 5 MG/ML
5 INJECTION INTRAMUSCULAR; INTRAVENOUS ONCE
Status: COMPLETED | OUTPATIENT
Start: 2023-07-30 | End: 2023-07-30

## 2023-07-30 RX ORDER — PROCHLORPERAZINE EDISYLATE 5 MG/ML
2.5 INJECTION INTRAMUSCULAR; INTRAVENOUS EVERY 6 HOURS PRN
Status: CANCELLED | OUTPATIENT
Start: 2023-07-30

## 2023-07-30 RX ORDER — OSELTAMIVIR PHOSPHATE 75 MG/1
75 CAPSULE ORAL 2 TIMES DAILY
Qty: 10 CAPSULE | Refills: 0 | Status: SHIPPED | OUTPATIENT
Start: 2023-07-30 | End: 2023-08-04

## 2023-07-30 RX ORDER — ONDANSETRON 4 MG/1
4 TABLET, ORALLY DISINTEGRATING ORAL ONCE
Status: DISCONTINUED | OUTPATIENT
Start: 2023-07-30 | End: 2023-07-30

## 2023-07-30 RX ORDER — ONDANSETRON 2 MG/ML
4 INJECTION INTRAMUSCULAR; INTRAVENOUS ONCE
Status: COMPLETED | OUTPATIENT
Start: 2023-07-30 | End: 2023-07-30

## 2023-07-30 RX ORDER — ACETAMINOPHEN 325 MG/1
650 TABLET ORAL EVERY 6 HOURS PRN
Status: DISCONTINUED | OUTPATIENT
Start: 2023-07-30 | End: 2023-07-30 | Stop reason: HOSPADM

## 2023-07-30 RX ADMIN — ONDANSETRON 4 MG: 2 INJECTION INTRAMUSCULAR; INTRAVENOUS at 02:07

## 2023-07-30 RX ADMIN — ACETAMINOPHEN 650 MG: 325 TABLET, FILM COATED ORAL at 03:07

## 2023-07-30 RX ADMIN — OSELTAMIVIR PHOSPHATE 75 MG: 75 CAPSULE ORAL at 04:07

## 2023-07-30 RX ADMIN — PROCHLORPERAZINE EDISYLATE 5 MG: 5 INJECTION INTRAMUSCULAR; INTRAVENOUS at 04:07

## 2023-07-30 NOTE — PROGRESS NOTES
"Subjective:      Patient ID: Yadira Rodrigues is a 33 y.o. female.    Vitals:  height is 5' 10" (1.778 m) and weight is 58.1 kg (128 lb). Her oral temperature is 98.7 °F (37.1 °C). Her blood pressure is 117/76 and her pulse is 92. Her respiration is 16 and oxygen saturation is 99%.     Chief Complaint: No chief complaint on file.    Pt is pregnant and been having bad morning sickness and stated that she is vomiting and coughing (taste like blood). 14 weeks of morning sickness and the cough started last night. Pt is taking docusate sodium for the morning sickness. However, nothing is working.   Provider note begins below  [place]   had URI symptoms recently.  Pt has had issues with morning sickness since the 6th week of pregnancy.  Reports frequent constipation.  Has tried zofran with mild relief.  Takes what is essential diclegis with minimal relief.  States she has never had a headache like this before.  Worst headache she has ever had.  No fevers.  Vomited once today.  Tolerating liquids.  Reports decreased fetal movement.  Headache not relieved with tylenol. Denies abd pain or vaginal bleeding.         Constitution: Negative for sweating, fatigue and fever.   HENT:  Negative for sore throat.    Cardiovascular:  Negative for chest pain and sob on exertion.   Respiratory:  Positive for cough. Negative for shortness of breath.    Gastrointestinal:  Positive for nausea, vomiting and constipation. Negative for diarrhea.   Genitourinary:  Negative for dysuria, frequency and urgency.      Objective:     Physical Exam   Constitutional: She is oriented to person, place, and time.   HENT:   Head: Normocephalic and atraumatic.   Nose: No rhinorrhea or congestion.   Mouth/Throat: Mucous membranes are moist. No oropharyngeal exudate or posterior oropharyngeal erythema. Oropharynx is clear.   Eyes: Pupils are equal, round, and reactive to light.   Cardiovascular: Normal rate.   Pulmonary/Chest: " Effort normal. No respiratory distress.   Abdominal: Normal appearance and bowel sounds are normal. There is no abdominal tenderness.   Neurological: She is alert and oriented to person, place, and time.   Skin: Skin is warm and dry.   Psychiatric: Her behavior is normal. Mood normal.     Results for orders placed or performed in visit on 07/30/23   SARS Coronavirus 2 Antigen, POCT Manual Read   Result Value Ref Range    SARS Coronavirus 2 Antigen Negative Negative     Acceptable Yes    POCT Urinalysis, Dipstick, Automated, W/O Scope   Result Value Ref Range    POC Blood, Urine Negative Negative    POC Bilirubin, Urine Negative Negative    POC Urobilinogen, Urine normal 0.1 - 1.1    POC Ketones, Urine Positive (A) Negative    POC Protein, Urine Negative Negative    POC Nitrates, Urine Negative Negative    POC Glucose, Urine Negative Negative    pH, UA 7.5     POC Specific Gravity, Urine 1.010 1.003 - 1.029    POC Leukocytes, Urine Negative Negative      Assessment:     1. Pregnancy headache in second trimester    2. Acute cough        Plan:   Covid test negative    Hydrate with electrolytes  UA-ketones.  No glucose or protein  Follow up with OB/Gyn within 24 hours  Headache, decreased fetal movement and nausea- ED precautions       Pregnancy headache in second trimester  -     SARS Coronavirus 2 Antigen, POCT Manual Read  -     POCT Urinalysis, Dipstick, Automated, W/O Scope    Acute cough  -     SARS Coronavirus 2 Antigen, POCT Manual Read

## 2023-07-30 NOTE — ED PROVIDER NOTES
Encounter Date: 2023       History     Chief Complaint   Patient presents with    Nausea    Emesis     HPI  Yadira Rodrigues is a 33 y.o. [id number] at 19w3d presents complaining of new onset of Headache, cough and congestion. She also endorses worsening nausea and vomiting. She took tylenol around 10am today but had an episode of emesis right after. She went to Urgent care where temperature was 98.7 °F (37.1 °C). Her blood pressure was 117/76 and her pulse is 92, she had a negative COVID test and UA was positive for ketones.      had a recent URI sickness.    Patient denies contractions, denies vaginal bleeding, denies LOF. Fetal Movement: normal.      Review of patient's allergies indicates:   Allergen Reactions    Codeine Nausea And Vomiting     Past Medical History:   Diagnosis Date    Abnormal Pap smear of cervix      No past surgical history on file.  Family History   Problem Relation Age of Onset    Hypertension Father     Hyperlipidemia Father     Prostate cancer Father         s/p resection    Hypertension Mother     Mental illness Sister     Mental illness Maternal Grandmother     Mental illness Paternal Grandmother     Cancer Maternal Uncle         lung?    Colon cancer Neg Hx     Breast cancer Neg Hx     Ovarian cancer Neg Hx      Social History     Tobacco Use    Smoking status: Never    Smokeless tobacco: Never   Substance Use Topics    Alcohol use: Not Currently     Alcohol/week: 4.0 standard drinks of alcohol     Types: 4 Standard drinks or equivalent per week     Comment: drinks socially, occasionally     Drug use: Never     Review of Systems   Constitutional:  Negative for chills, diaphoresis and fever.   HENT:  Positive for congestion.    Respiratory:  Negative for shortness of breath.    Cardiovascular:  Negative for chest pain and leg swelling.   Gastrointestinal:  Positive for nausea and vomiting. Negative for abdominal pain and constipation.   Genitourinary:  Negative for dysuria,  vaginal bleeding and vaginal discharge.   Musculoskeletal:  Negative for back pain.   Skin:  Negative for rash.   Neurological:  Negative for light-headedness and headaches.   Psychiatric/Behavioral:  Negative for confusion.        Physical Exam     Initial Vitals   BP Pulse Resp Temp SpO2   07/30/23 1326 07/30/23 1326 07/30/23 1326 07/30/23 1326 07/30/23 1357   116/68 83 16 98.5 °F (36.9 °C) 100 %      MAP       --                BP: 116/80    Physical Exam    Constitutional: She appears well-developed and well-nourished.   HENT:   Head: Normocephalic.   Eyes: EOM are normal.   Cardiovascular:  Normal rate.           Pulmonary/Chest: Breath sounds normal. No respiratory distress.   Abdominal: Abdomen is soft. She exhibits no distension. There is no abdominal tenderness. There is no rebound and no guarding.     Neurological: She is alert and oriented to person, place, and time.   Skin: Skin is warm and dry.   Psychiatric: She has a normal mood and affect. Thought content normal.       ED Course   Procedures  FHTs 160    Labs Reviewed   INFLUENZA A & B BY MOLECULAR - Abnormal; Notable for the following components:       Result Value    Influenza A, Molecular Positive (*)     All other components within normal limits   CBC W/ AUTO DIFFERENTIAL - Abnormal; Notable for the following components:    RBC 3.52 (*)     Hemoglobin 11.2 (*)     Hematocrit 32.0 (*)     MCH 31.8 (*)     Lymph # 0.4 (*)     Gran % 87.9 (*)     Lymph % 5.4 (*)     All other components within normal limits   COMPREHENSIVE METABOLIC PANEL - Abnormal; Notable for the following components:    Sodium 131 (*)     CO2 19 (*)     BUN 5 (*)     Albumin 3.4 (*)     Alkaline Phosphatase 46 (*)     All other components within normal limits   POCT URINALYSIS, DIPSTICK OR TABLET REAGENT, AUTOMATED, WITH MICROSCOP - Abnormal   MAGNESIUM   PHOSPHORUS          Imaging Results    None          Medications   acetaminophen tablet 650 mg (650 mg Oral Given 7/30/23  1523)   lactated ringers bolus 1,000 mL (has no administration in time range)   oseltamivir capsule 75 mg (75 mg Oral Given 23 1627)   ondansetron injection 4 mg (4 mg Intravenous Given 23 1457)   prochlorperazine injection Soln 5 mg (5 mg Intravenous Given 23 1600)     Medical Decision Making:   Initial Assessment:   Yadira Rodrigues is a 33 y.o. [id number] at 19w3d presents complaining of new onset of Headache, cough and recent contact with  with URI symptoms  Differential Diagnosis:   Viral URI Infection  ED Management:  - VSS  - Urine dip: +small ketones, trace protein, trace leukocytes  - Influenza positive  - Tylenol 650 for headaches  - Zofran PRN for nausea  - s/p 1000 cc LR bolus   - Patient discharged of TAMIFLU BID for 5 days.            Attending Attestation:   Physician Attestation Statement for Resident:  As the supervising MD   Physician Attestation Statement: I have personally seen and examined this patient.   I agree with the above history.  -:   As the supervising MD I agree with the above PE.     As the supervising MD I agree with the above treatment, course, plan, and disposition.   I was personally present during the critical portions of the procedure(s) performed by the resident and was immediately available in the ED to provide services and assistance as needed during the entire procedure.                      Ruddy Sandy MD  Obstetrics and Gynecology- PGY1           Clinical Impression:   Final diagnoses:  [Z3A.19] 19 weeks gestation of pregnancy (Primary)  [R11.2] Nausea and vomiting, unspecified vomiting type  [J11.1] Influenza        ED Disposition Condition    Discharge Stable          ED Prescriptions    None       Follow-up Information    None          Ruddy Sandy MD  Resident  [phone]       Ruddy Sandy MD  Resident  [phone]       Annabelle Sheridan MD  [phone]

## 2023-07-31 ENCOUNTER — PATIENT MESSAGE (OUTPATIENT)
Dept: MATERNAL FETAL MEDICINE | Facility: CLINIC | Age: 33
End: 2023-07-31
Payer: COMMERCIAL

## 2023-08-01 ENCOUNTER — PATIENT MESSAGE (OUTPATIENT)
Dept: MATERNAL FETAL MEDICINE | Facility: CLINIC | Age: 33
End: 2023-08-01
Payer: COMMERCIAL

## 2023-08-02 ENCOUNTER — PROCEDURE VISIT (OUTPATIENT)
Dept: MATERNAL FETAL MEDICINE | Facility: CLINIC | Age: 33
End: 2023-08-02
Payer: COMMERCIAL

## 2023-08-02 DIAGNOSIS — Z36.2 ENCOUNTER FOR FOLLOW-UP ULTRASOUND OF FETAL ANATOMY: Primary | ICD-10-CM

## 2023-08-02 DIAGNOSIS — Z36.4 ULTRASOUND FOR ANTENATAL SCREENING FOR FETAL GROWTH RESTRICTION: ICD-10-CM

## 2023-08-02 DIAGNOSIS — Z36.3 ANTENATAL SCREENING FOR MALFORMATION USING ULTRASONICS: Primary | ICD-10-CM

## 2023-08-02 DIAGNOSIS — Z34.02 ENCOUNTER FOR SUPERVISION OF NORMAL FIRST PREGNANCY IN SECOND TRIMESTER: ICD-10-CM

## 2023-08-02 DIAGNOSIS — Z3A.19 19 WEEKS GESTATION OF PREGNANCY: ICD-10-CM

## 2023-08-02 PROCEDURE — 76805 PR US, OB 14+WKS, TRANSABD, SINGLE GESTATION: ICD-10-PCS | Mod: S$GLB,,, | Performed by: OBSTETRICS & GYNECOLOGY

## 2023-08-02 PROCEDURE — 76805 OB US >/= 14 WKS SNGL FETUS: CPT | Mod: S$GLB,,, | Performed by: OBSTETRICS & GYNECOLOGY

## 2023-08-03 ENCOUNTER — OFFICE VISIT (OUTPATIENT)
Dept: OBSTETRICS AND GYNECOLOGY | Facility: CLINIC | Age: 33
End: 2023-08-03
Payer: COMMERCIAL

## 2023-08-03 ENCOUNTER — PATIENT MESSAGE (OUTPATIENT)
Dept: OTHER | Facility: OTHER | Age: 33
End: 2023-08-03
Payer: COMMERCIAL

## 2023-08-03 DIAGNOSIS — J10.1 INFLUENZA A: ICD-10-CM

## 2023-08-03 DIAGNOSIS — O21.9 VOMITING OR NAUSEA OF PREGNANCY: Primary | ICD-10-CM

## 2023-08-03 PROCEDURE — 99213 PR OFFICE/OUTPT VISIT, EST, LEVL III, 20-29 MIN: ICD-10-PCS | Mod: 95,,, | Performed by: STUDENT IN AN ORGANIZED HEALTH CARE EDUCATION/TRAINING PROGRAM

## 2023-08-03 PROCEDURE — 99213 OFFICE O/P EST LOW 20 MIN: CPT | Mod: 95,,, | Performed by: STUDENT IN AN ORGANIZED HEALTH CARE EDUCATION/TRAINING PROGRAM

## 2023-08-03 RX ORDER — PROMETHAZINE HYDROCHLORIDE 12.5 MG/1
12.5 TABLET ORAL EVERY 4 HOURS PRN
Qty: 30 TABLET | Refills: 2 | Status: SHIPPED | OUTPATIENT
Start: 2023-08-03 | End: 2023-10-31

## 2023-08-03 NOTE — PROGRESS NOTES
Visit type: audiovisual  Total time spent with patient: 15 minutes    Each patient to whom he or she provides medical services by telemedicine is:  (1) informed of the relationship between the physician and patient and the respective role of any other health care provider with respect to management of the patient; and (2) notified that he or she may decline to receive medical services by telemedicine and may withdraw from such care at any time.      Chief Complaint: Nausea     HPI:      Yadira Rodrigues is a 33 y.o.  at 20w0d who presents to discuss lab results and nausea. She states nausea has been persistent throughout the pregnancy. She kept hearing that it would improve in the second trimester but it has not. She is not having much vomiting, zofran seems to be controlling this well. The nausea is debilitating for her. She spends most of her day in bed. 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 She has tried unisom/B6, reglan, and cyclazine with no improvement in nausea. She states she is gaining weight and keeping down most of what she eats but needs relief from the nausea. Of note, she presented to the OB ED 4 days ago for malaise, N/V and tested positive for influenza A. She is currently taking tamiflu. Had labs drawn at [facility] and would like to review them.      ROS:     GENERAL: Flu-symptoms improving  ABDOMEN: +Nausea, occasional vomiting.  GYNECOLOGIC: See HPI.      Physical Exam:      PHYSICAL EXAM:  LMP 2023   There is no height or weight on file to calculate BMI.     APPEARANCE: Well nourished, well developed, in no acute distress.  Exam deferred due to televisit    Results:     CBC, CMP, mag, phos from  reviewed. Unremarkable. Electrolytes overall WNL for pregnancy.     Assessment/Plan:     Vomiting or nausea of pregnancy    Influenza A    Other orders  -     promethazine (PHENERGAN) 12.5 MG Tab; Take 1 tablet (12.5 mg total) by mouth  every 4 (four) hours as needed (Nausea and vomiting).  Dispense: 30 tablet; Refill: 2      -- Labs reviewed per patient request. All questions answered.  -- Anatomy scan done yesterday, report not available. Discussed that Dr. Benitez will get this report once it is available.  -- Discussed that decision to stop working is up to her discretion. At this time, with normal labs and increasing weight would not recommend hospitalization or other aggressive treatment. Can try phenergan as this is one of the few things that she hasn't tried. Discussed that it may make her drowsy, would start at night. She verbalized understanding.  -- Will plan for follow up next week after flu quarantine period. Encouraged her to reach out sooner with any worsening symptoms.        Mattie Crocker MD

## 2023-08-07 ENCOUNTER — TELEPHONE (OUTPATIENT)
Dept: OBSTETRICS AND GYNECOLOGY | Facility: HOSPITAL | Age: 33
End: 2023-08-07
Payer: COMMERCIAL

## 2023-08-07 NOTE — TELEPHONE ENCOUNTER
Patient called.  Reports her nausea is consistent despite the phenergan.  She is still taking zofran every morning which prevents her from vomiting.  She is taking the phenergan at night.  We discussed adding one or the other in the afternoon if needed.  She is eating oatmeal and berries for breakfast and sandwiches with tofu for lunch.  She eats nut and fruit bars for snacks.  Eats a sandwich for dinner.  She is keeping down food and water, but just feels queasy.  She has tried: declegis, reglan, zofran, and now phenergan.  Will follow up with me in one week.  Asked to have some disability paperwork filled out because the nausea is keeping her out of work several days.  I agree to fill this out for her.      Mary Grace Benitez

## 2023-08-16 ENCOUNTER — ROUTINE PRENATAL (OUTPATIENT)
Dept: OBSTETRICS AND GYNECOLOGY | Facility: CLINIC | Age: 33
End: 2023-08-16
Payer: COMMERCIAL

## 2023-08-16 VITALS — DIASTOLIC BLOOD PRESSURE: 68 MMHG | WEIGHT: 133.38 LBS | SYSTOLIC BLOOD PRESSURE: 98 MMHG | BODY MASS INDEX: 19.14 KG/M2

## 2023-08-16 DIAGNOSIS — Z34.02 ENCOUNTER FOR SUPERVISION OF NORMAL FIRST PREGNANCY IN SECOND TRIMESTER: Primary | ICD-10-CM

## 2023-08-16 PROCEDURE — 0502F SUBSEQUENT PRENATAL CARE: CPT | Mod: CPTII,S$GLB,, | Performed by: OBSTETRICS & GYNECOLOGY

## 2023-08-16 PROCEDURE — 99999 PR PBB SHADOW E&M-EST. PATIENT-LVL III: ICD-10-PCS | Mod: PBBFAC,,, | Performed by: OBSTETRICS & GYNECOLOGY

## 2023-08-16 PROCEDURE — 0502F PR SUBSEQUENT PRENATAL CARE: ICD-10-PCS | Mod: CPTII,S$GLB,, | Performed by: OBSTETRICS & GYNECOLOGY

## 2023-08-16 PROCEDURE — 99999 PR PBB SHADOW E&M-EST. PATIENT-LVL III: CPT | Mod: PBBFAC,,, | Performed by: OBSTETRICS & GYNECOLOGY

## 2023-08-16 NOTE — PROGRESS NOTES
Patient still struggling with nausea, not vomiting.  Was taking phenergan but stopped about one week ago because it was causing more exhaustion and she didn't find it was helping with her nausea.  Will increase zofran regimen and add in alternating reglan.  She has tried several different medications, but none consistently and for long periods of time.  She is struggling with constipation, but is managing it with colace.    She is feeling some slight movements.  No bleeding, no cramping.

## 2023-08-31 ENCOUNTER — PATIENT MESSAGE (OUTPATIENT)
Dept: OTHER | Facility: OTHER | Age: 33
End: 2023-08-31
Payer: COMMERCIAL

## 2023-09-01 ENCOUNTER — TELEPHONE (OUTPATIENT)
Dept: PEDIATRICS | Facility: CLINIC | Age: 33
End: 2023-09-01
Payer: COMMERCIAL

## 2023-09-01 NOTE — TELEPHONE ENCOUNTER
Spoke with dad, verified that she is coming for the appointment Tuesday.     Regular rate and rhythm, Heart sounds S1 S2 present, no murmurs, rubs or gallops

## 2023-09-05 ENCOUNTER — OFFICE VISIT (OUTPATIENT)
Dept: PEDIATRICS | Facility: CLINIC | Age: 33
End: 2023-09-05
Payer: COMMERCIAL

## 2023-09-05 DIAGNOSIS — Z76.81 EXPECTANT PARENT PREBIRTH PEDIATRICIAN VISIT: Primary | ICD-10-CM

## 2023-09-05 PROCEDURE — 99499 UNLISTED E&M SERVICE: CPT | Mod: S$GLB,,, | Performed by: PEDIATRICS

## 2023-09-05 PROCEDURE — 99499 NO LOS: ICD-10-PCS | Mod: S$GLB,,, | Performed by: PEDIATRICS

## 2023-09-05 NOTE — PROGRESS NOTES
CC: Prenatal visit    Due date: 21st December  Gender: Girl  OB: Dr. 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     Parental concerns or questions today: planning to raise baby plant based, nutritional recommendations, vaccines    [facility] practice care reviewed.  All questions answered.

## 2023-09-14 ENCOUNTER — ROUTINE PRENATAL (OUTPATIENT)
Dept: OBSTETRICS AND GYNECOLOGY | Facility: CLINIC | Age: 33
End: 2023-09-14
Payer: COMMERCIAL

## 2023-09-14 ENCOUNTER — PROCEDURE VISIT (OUTPATIENT)
Dept: MATERNAL FETAL MEDICINE | Facility: CLINIC | Age: 33
End: 2023-09-14
Payer: COMMERCIAL

## 2023-09-14 ENCOUNTER — PATIENT MESSAGE (OUTPATIENT)
Dept: OTHER | Facility: OTHER | Age: 33
End: 2023-09-14
Payer: COMMERCIAL

## 2023-09-14 VITALS
SYSTOLIC BLOOD PRESSURE: 102 MMHG | DIASTOLIC BLOOD PRESSURE: 68 MMHG | WEIGHT: 136.25 LBS | BODY MASS INDEX: 19.55 KG/M2

## 2023-09-14 DIAGNOSIS — Z36.2 ENCOUNTER FOR FOLLOW-UP ULTRASOUND OF FETAL ANATOMY: ICD-10-CM

## 2023-09-14 DIAGNOSIS — Z34.02 ENCOUNTER FOR SUPERVISION OF NORMAL FIRST PREGNANCY IN SECOND TRIMESTER: Primary | ICD-10-CM

## 2023-09-14 PROCEDURE — 0502F SUBSEQUENT PRENATAL CARE: CPT | Mod: CPTII,S$GLB,, | Performed by: OBSTETRICS & GYNECOLOGY

## 2023-09-14 PROCEDURE — 99999 PR PBB SHADOW E&M-EST. PATIENT-LVL III: ICD-10-PCS | Mod: PBBFAC,,, | Performed by: OBSTETRICS & GYNECOLOGY

## 2023-09-14 PROCEDURE — 76816 OB US FOLLOW-UP PER FETUS: CPT | Mod: S$GLB,,, | Performed by: OBSTETRICS & GYNECOLOGY

## 2023-09-14 PROCEDURE — 0502F PR SUBSEQUENT PRENATAL CARE: ICD-10-PCS | Mod: CPTII,S$GLB,, | Performed by: OBSTETRICS & GYNECOLOGY

## 2023-09-14 PROCEDURE — 99999 PR PBB SHADOW E&M-EST. PATIENT-LVL III: CPT | Mod: PBBFAC,,, | Performed by: OBSTETRICS & GYNECOLOGY

## 2023-09-14 PROCEDURE — 76816 US MFM PROCEDURE (VIEWPOINT): ICD-10-PCS | Mod: S$GLB,,, | Performed by: OBSTETRICS & GYNECOLOGY

## 2023-09-14 RX ORDER — DOXYLAMINE SUCCINATE 25 MG/1
TABLET ORAL
COMMUNITY
Start: 2023-05-01 | End: 2024-01-19

## 2023-09-14 NOTE — PROGRESS NOTES
Good fetal movement.  No contractions, no vaginal bleeding, and no loss of fluid.    For nausea, patient using zofran in the morning and unisom at night and she is functional with this.  Able to keep down food and water with this regimen.  Had trouble with constipation when she was taking two zofran per day, so she stopped this.   Patient is considering alternative routes of pain relief (pudendal nerve block, nitrous, etc); will place consult for anesthesia at the next appointment.   Ob glucose ordered. S/p flu shot.  Recommended tdap.

## 2023-09-19 ENCOUNTER — TELEPHONE (OUTPATIENT)
Dept: OBSTETRICS AND GYNECOLOGY | Facility: CLINIC | Age: 33
End: 2023-09-19
Payer: COMMERCIAL

## 2023-09-19 ENCOUNTER — PATIENT MESSAGE (OUTPATIENT)
Dept: OBSTETRICS AND GYNECOLOGY | Facility: CLINIC | Age: 33
End: 2023-09-19
Payer: COMMERCIAL

## 2023-09-21 ENCOUNTER — LAB VISIT (OUTPATIENT)
Dept: LAB | Facility: OTHER | Age: 33
End: 2023-09-21
Attending: OBSTETRICS & GYNECOLOGY
Payer: COMMERCIAL

## 2023-09-21 DIAGNOSIS — Z34.02 ENCOUNTER FOR SUPERVISION OF NORMAL FIRST PREGNANCY IN SECOND TRIMESTER: ICD-10-CM

## 2023-09-21 LAB — GLUCOSE SERPL-MCNC: 92 MG/DL (ref 70–140)

## 2023-09-21 PROCEDURE — 82950 GLUCOSE TEST: CPT | Performed by: OBSTETRICS & GYNECOLOGY

## 2023-09-21 PROCEDURE — 36415 COLL VENOUS BLD VENIPUNCTURE: CPT | Performed by: OBSTETRICS & GYNECOLOGY

## 2023-09-27 ENCOUNTER — OFFICE VISIT (OUTPATIENT)
Dept: OBSTETRICS AND GYNECOLOGY | Facility: CLINIC | Age: 33
End: 2023-09-27
Payer: COMMERCIAL

## 2023-09-27 VITALS
DIASTOLIC BLOOD PRESSURE: 64 MMHG | BODY MASS INDEX: 19.57 KG/M2 | WEIGHT: 136.69 LBS | HEIGHT: 70 IN | SYSTOLIC BLOOD PRESSURE: 100 MMHG

## 2023-09-27 DIAGNOSIS — Z3A.27 27 WEEKS GESTATION OF PREGNANCY: ICD-10-CM

## 2023-09-27 DIAGNOSIS — N89.8 VAGINAL DISCHARGE: Primary | ICD-10-CM

## 2023-09-27 DIAGNOSIS — N89.8 VAGINAL ITCHING: ICD-10-CM

## 2023-09-27 PROCEDURE — 3078F PR MOST RECENT DIASTOLIC BLOOD PRESSURE < 80 MM HG: ICD-10-PCS | Mod: CPTII,S$GLB,, | Performed by: OBSTETRICS & GYNECOLOGY

## 2023-09-27 PROCEDURE — 87510 GARDNER VAG DNA DIR PROBE: CPT

## 2023-09-27 PROCEDURE — 3074F SYST BP LT 130 MM HG: CPT | Mod: CPTII,S$GLB,, | Performed by: OBSTETRICS & GYNECOLOGY

## 2023-09-27 PROCEDURE — 1160F PR REVIEW ALL MEDS BY PRESCRIBER/CLIN PHARMACIST DOCUMENTED: ICD-10-PCS | Mod: CPTII,S$GLB,, | Performed by: OBSTETRICS & GYNECOLOGY

## 2023-09-27 PROCEDURE — 1160F RVW MEDS BY RX/DR IN RCRD: CPT | Mod: CPTII,S$GLB,, | Performed by: OBSTETRICS & GYNECOLOGY

## 2023-09-27 PROCEDURE — 3008F PR BODY MASS INDEX (BMI) DOCUMENTED: ICD-10-PCS | Mod: CPTII,S$GLB,, | Performed by: OBSTETRICS & GYNECOLOGY

## 2023-09-27 PROCEDURE — 3074F PR MOST RECENT SYSTOLIC BLOOD PRESSURE < 130 MM HG: ICD-10-PCS | Mod: CPTII,S$GLB,, | Performed by: OBSTETRICS & GYNECOLOGY

## 2023-09-27 PROCEDURE — 99999 PR PBB SHADOW E&M-EST. PATIENT-LVL III: CPT | Mod: PBBFAC,,,

## 2023-09-27 PROCEDURE — 1159F PR MEDICATION LIST DOCUMENTED IN MEDICAL RECORD: ICD-10-PCS | Mod: CPTII,S$GLB,, | Performed by: OBSTETRICS & GYNECOLOGY

## 2023-09-27 PROCEDURE — 99212 OFFICE O/P EST SF 10 MIN: CPT | Mod: S$GLB,,, | Performed by: OBSTETRICS & GYNECOLOGY

## 2023-09-27 PROCEDURE — 3078F DIAST BP <80 MM HG: CPT | Mod: CPTII,S$GLB,, | Performed by: OBSTETRICS & GYNECOLOGY

## 2023-09-27 PROCEDURE — 87480 CANDIDA DNA DIR PROBE: CPT

## 2023-09-27 PROCEDURE — 87660 TRICHOMONAS VAGIN DIR PROBE: CPT

## 2023-09-27 PROCEDURE — 99212 PR OFFICE/OUTPT VISIT, EST, LEVL II, 10-19 MIN: ICD-10-PCS | Mod: S$GLB,,, | Performed by: OBSTETRICS & GYNECOLOGY

## 2023-09-27 PROCEDURE — 3008F BODY MASS INDEX DOCD: CPT | Mod: CPTII,S$GLB,, | Performed by: OBSTETRICS & GYNECOLOGY

## 2023-09-27 PROCEDURE — 99999 PR PBB SHADOW E&M-EST. PATIENT-LVL III: ICD-10-PCS | Mod: PBBFAC,,,

## 2023-09-27 PROCEDURE — 1159F MED LIST DOCD IN RCRD: CPT | Mod: CPTII,S$GLB,, | Performed by: OBSTETRICS & GYNECOLOGY

## 2023-09-27 NOTE — PATIENT INSTRUCTIONS
Call  L & D after hours at [phone] for vaginal bleeding, leakage of fluids, contractions 4-5 in one hour, decreased fetal movements ( 10 kicks in 2 hours), headache not relieved by Tylenol, blurry vision, or temp of 100.4 or greater.  Begin doing fetal kick counts, at least 10 movements in 2 hours starting at 28 weeks gestation.  Keep next clinic appointment.

## 2023-09-27 NOTE — PROGRESS NOTES
"CC: vaginal itching     HPI:  Yadira Rodrigues is a 33 y.o. female  presents to women[facility] walk in clinic with complaint of vaginal itching. Felt itching last night but not so much today. Reports discharge. Would like to be tested for yeast/BV. She is 27 week 6 days. No OB complaints today.     Past Medical History:   Diagnosis Date    Abnormal Pap smear of cervix      History reviewed. No pertinent surgical history.  Social History     Tobacco Use    Smoking status: Never    Smokeless tobacco: Never   Substance Use Topics    Alcohol use: Not Currently     Alcohol/week: 4.0 standard drinks of alcohol     Types: 4 Standard drinks or equivalent per week     Comment: drinks socially, occasionally     Drug use: Never     Family History   Problem Relation Age of Onset    Hypertension Father     Hyperlipidemia Father     Prostate cancer Father         s/p resection    Hypertension Mother     Mental illness Sister     Mental illness Maternal Grandmother     Mental illness Paternal Grandmother     Cancer Maternal Uncle         lung?    Colon cancer Neg Hx     Breast cancer Neg Hx     Ovarian cancer Neg Hx      OB History    Para Term  AB Living   2       1     SAB IAB Ectopic Multiple Live Births                  # Outcome Date GA Lbr Bar/2nd Weight Sex Delivery Anes PTL Lv   2 Current            1 AB 2017    U    FD       /64   Ht 5' 10" (1.778 m)   Wt 62 kg (136 lb 11 oz)   LMP 2023   BMI 19.61 kg/m²     ROS:  GENERAL: No fever, chills, fatigability or weight loss.  VULVAR: No pain, no lesions and no itching.  VAGINAL: No relaxation,  no discharge, no abnormal bleeding and no lesions. Reports itching.   ABDOMEN: No abdominal pain. Denies nausea. Denies vomiting. No diarrhea. No constipation  BREAST: Denies pain. No lumps. No discharge.  URINARY: No incontinence, no nocturia, no frequency and no dysuria.  CARDIOVASCULAR: No chest pain. No shortness of breath. No leg " cramps.  NEUROLOGICAL: No headaches. No vision changes.    PHYSICAL EXAM:  VULVA: normal appearing vulva with no masses, tenderness or lesions   VAGINA: normal appearing vagina with normal color. Thin discharge, no lesions     ASSESSMENT and PLAN:    ICD-10-CM ICD-9-CM    1. Vaginal discharge  N89.8 623.5 Vaginosis Screen by DNA Probe      2. Vaginal itching  N89.8 698.1 Vaginosis Screen by DNA Probe      3. 27 weeks gestation of pregnancy  Z3A.27 V22.2 Vaginosis Screen by DNA Probe        - AFFIRM collected  - Heart tones 150s    Patient was counseled today on vaginitis prevention including :  a. avoiding feminine products such as deoderant soaps, body wash, bubble bath, douches, scented toilet paper, deoderant tampons or pads, feminine wipes, chronic pad use, etc.  b. avoiding other vulvovaginal irritants such as long hot baths, humidity, tight, synthetic clothing, chlorine and sitting around in wet bathing suits  c. wearing cotton underwear, avoiding thong underwear and no underwear to bed  d. taking showers instead of baths and use a hair dryer on cool setting afterwards to dry  e. wearing cotton to exercise and shower immediately after exercise and change clothes  f. using polyurethane condoms without spermicide if sexually active and symptoms are triggered by intercourse    FOLLOW UP: PRN lack of improvement.    Hayde Harris, PAUL JETER

## 2023-09-28 ENCOUNTER — PATIENT MESSAGE (OUTPATIENT)
Dept: OTHER | Facility: OTHER | Age: 33
End: 2023-09-28
Payer: COMMERCIAL

## 2023-09-28 LAB
CANDIDA RRNA VAG QL PROBE: NEGATIVE
G VAGINALIS RRNA GENITAL QL PROBE: NEGATIVE
T VAGINALIS RRNA GENITAL QL PROBE: NEGATIVE

## 2023-10-04 ENCOUNTER — PATIENT MESSAGE (OUTPATIENT)
Dept: OBSTETRICS AND GYNECOLOGY | Facility: CLINIC | Age: 33
End: 2023-10-04
Payer: COMMERCIAL

## 2023-10-04 DIAGNOSIS — O21.9 NAUSEA/VOMITING IN PREGNANCY: Primary | ICD-10-CM

## 2023-10-05 RX ORDER — ONDANSETRON 4 MG/1
4 TABLET, ORALLY DISINTEGRATING ORAL EVERY 6 HOURS PRN
Qty: 9 TABLET | Refills: 6 | OUTPATIENT
Start: 2023-10-05

## 2023-10-05 RX ORDER — ONDANSETRON 4 MG/1
4 TABLET, ORALLY DISINTEGRATING ORAL EVERY 6 HOURS PRN
Qty: 60 TABLET | Refills: 3 | Status: SHIPPED | OUTPATIENT
Start: 2023-10-05 | End: 2024-01-19

## 2023-10-07 ENCOUNTER — HOSPITAL ENCOUNTER (EMERGENCY)
Facility: OTHER | Age: 33
Discharge: HOME | End: 2023-10-07
Attending: OBSTETRICS & GYNECOLOGY
Payer: COMMERCIAL

## 2023-10-07 VITALS
SYSTOLIC BLOOD PRESSURE: 90 MMHG | DIASTOLIC BLOOD PRESSURE: 53 MMHG | HEART RATE: 64 BPM | TEMPERATURE: 98 F | RESPIRATION RATE: 16 BRPM | OXYGEN SATURATION: 100 %

## 2023-10-07 DIAGNOSIS — O26.893 ABDOMINAL PAIN DURING PREGNANCY IN THIRD TRIMESTER: Primary | ICD-10-CM

## 2023-10-07 DIAGNOSIS — Z3A.29 29 WEEKS GESTATION OF PREGNANCY: ICD-10-CM

## 2023-10-07 DIAGNOSIS — R10.9 ABDOMINAL PAIN DURING PREGNANCY IN THIRD TRIMESTER: Primary | ICD-10-CM

## 2023-10-07 PROCEDURE — 99284 EMERGENCY DEPT VISIT MOD MDM: CPT | Mod: 25

## 2023-10-07 PROCEDURE — 99283 EMERGENCY DEPT VISIT LOW MDM: CPT | Mod: 25,,, | Performed by: OBSTETRICS & GYNECOLOGY

## 2023-10-07 PROCEDURE — 59025 FETAL NON-STRESS TEST: CPT | Mod: 26,,, | Performed by: OBSTETRICS & GYNECOLOGY

## 2023-10-07 PROCEDURE — 59025 PR FETAL 2N-STRESS TEST: ICD-10-PCS | Mod: 26,,, | Performed by: OBSTETRICS & GYNECOLOGY

## 2023-10-07 PROCEDURE — 99283 PR EMERGENCY DEPT VISIT,LEVEL III: ICD-10-PCS | Mod: 25,,, | Performed by: OBSTETRICS & GYNECOLOGY

## 2023-10-07 PROCEDURE — 59025 FETAL NON-STRESS TEST: CPT

## 2023-10-07 NOTE — ED NOTES
Udip performed.     All results negative/normal, except protein which shows a trace amount.    Dr. Morel notified of udip results. No orders given.

## 2023-10-07 NOTE — DISCHARGE INSTRUCTIONS
Return to [facility] for the following:     Decreased fetal movement   Leakage of fluid  Vaginal bleeding  Contractions    Labor and Delivery phone number: [phone]

## 2023-10-07 NOTE — ED PROVIDER NOTES
Encounter Date: 10/7/2023       History     Chief Complaint   Patient presents with    Contractions     Yadira Rodrigues is a 33 y.o. [id number] at 29w2d presents complaining of cramping abdominal pain. She states it began about 1-2 hours PTA. She states the pain occurs every 5 minutes and the pain pains for 30 seconds. She states it feels like diarrhea cramps but she is constipated from taking zofran consistently for nausea. Her last BM was this morning, she states she typically has a bowel movement every other day. She denies dysuria, urgency & frequency. Patient endorses drinking coffee today for the first time in a long time.     Of note, patient endorses sick contacts with diarrhea.     This IUP is complicated by persistent nausea & anxiety.  Patient denies contractions, denies vaginal bleeding, denies LOF.   Fetal Movement: normal      The history is provided by the patient. No  was used.     Review of patient's allergies indicates:   Allergen Reactions    Codeine Nausea And Vomiting     Past Medical History:   Diagnosis Date    Abnormal Pap smear of cervix      History reviewed. No pertinent surgical history.  Family History   Problem Relation Age of Onset    Hypertension Father     Hyperlipidemia Father     Prostate cancer Father         s/p resection    Hypertension Mother     Mental illness Sister     Mental illness Maternal Grandmother     Mental illness Paternal Grandmother     Cancer Maternal Uncle         lung?    Colon cancer Neg Hx     Breast cancer Neg Hx     Ovarian cancer Neg Hx      Social History     Tobacco Use    Smoking status: Never    Smokeless tobacco: Never   Substance Use Topics    Alcohol use: Not Currently     Alcohol/week: 4.0 standard drinks of alcohol     Types: 4 Standard drinks or equivalent per week     Comment: drinks socially, occasionally     Drug use: Never     Review of Systems   Constitutional:  Negative for chills, fatigue and fever.   HENT:  Negative for  congestion.    Eyes:  Negative for visual disturbance.   Respiratory:  Negative for shortness of breath.    Cardiovascular:  Negative for chest pain and leg swelling.   Gastrointestinal:  Positive for abdominal pain and constipation. Negative for diarrhea, nausea and vomiting.   Genitourinary:  Positive for pelvic pain. Negative for dysuria and frequency.   Musculoskeletal:  Negative for arthralgias and joint swelling.   Skin:  Negative for rash.   Neurological:  Negative for weakness and headaches.   Psychiatric/Behavioral:  Negative for confusion and sleep disturbance.        Physical Exam     Initial Vitals [10/07/23 1500]   BP Pulse Resp Temp SpO2   127/79 75 16 97.7 °F (36.5 °C) 100 %      MAP       --         Physical Exam    Vitals reviewed.  Constitutional: She appears well-developed and well-nourished. She is not diaphoretic. No distress.   HENT:   Head: Normocephalic and atraumatic.   Nose: Nose normal.   Eyes: EOM are normal.   Neck:   Normal range of motion.  Cardiovascular:  Normal rate and intact distal pulses.           Pulmonary/Chest: No respiratory distress. She exhibits no tenderness.   Normal work of breathing   Abdominal: There is no abdominal tenderness.   Gravid Abdomen There is no rebound and no guarding.   Musculoskeletal:         General: Normal range of motion.      Cervical back: Normal range of motion.     Neurological: She is alert and oriented to person, place, and time.   Skin: Skin is warm and dry.   Psychiatric: She has a normal mood and affect. Her behavior is normal. Judgment and thought content normal.     OB LABOR EXAM:       Method: Sterile vaginal exam per MD.   Vaginal Bleeding: none present.     Dilatation: 0.   Station: -4.   Effacement: 40%.             ED Course   Obtain Fetal nonstress test (NST)    Date/Time: 10/7/2023 3:00 PM    Performed by: Liz Morel MD  Authorized by: Liz Morel MD    Nonstress Test:     Variability:  6-25 BPM    Decelerations:  None     Accelerations:  15 bpm    Baseline:  140    Uterine Irritability: No      Contractions:  Not present  Biophysical Profile:     Nonstress Test Interpretation: reactive      Overall Impression:  Reassuring  Post-procedure:     Patient tolerance:  Patient tolerated the procedure well with no immediate complications    Labs Reviewed - No data to display       Imaging Results    None          Medications - No data to display  Medical Decision Making  Yadira Rodrigues is a 33 y.o. [id number] at 29w2d presents complaining of abdominal pain.     - VSS & WNL  - PE as above  - NST: 140 bpm, mod BTBV, + accels, - decels, reactive & reassuring  - TOCO: no uterine contractions or irritability  - SVE @ 1545: 0/30/-4  - Repeat SVE @ 1645: unchanged  - Udip: trace protein, otherwise WNL  - Patient stable for DC home   - Patient verbalizes understanding & is in agreement with plan  - Given ED return precautions              Attending Attestation:   Physician Attestation Statement for Resident:  As the supervising MD   Physician Attestation Statement: I have personally seen and examined this patient.   I agree with the above history.  -:   As the supervising MD I agree with the above PE.     As the supervising MD I agree with the above treatment, course, plan, and disposition.   I was personally present during the critical portions of the procedure(s) performed by the resident and was immediately available in the ED to provide services and assistance as needed during the entire procedure.              Attending ED Notes:   I have personally seen and examined the patient. I agree with the resident's note . Questions welcomed and answered to patient satisfaction.      @ 29w2d  presenting for abdominal pain  Low suspicion for labor given SVE and quiescent tocometer, possible GI given PO intake and sick contacts. Advised to continue hydration  NST: 140/mod/+accels/-decels    Agree with discharge to home, and given the following  instructions: Resume normal activities, encouraged to hydrate well (3L or 100oz of fluids daily). Return to the OB ED for acute concerns such as vaginal bleeding, frequent or painful contractions, loss of fluid or decreased fetal movement.     Return to clinic as scheduled: 10/17 for next PNV    Siobhan Mcgrath MD  10/7/2023 8:56 PM          ED Course as of 10/07/23 1651   Sat Oct 07, 2023   1641 BP: 127/79 [AW]   1641 Temp: 97.7 °F (36.5 °C) [AW]   1641 Pulse: 75 [AW]   1641 Resp: 16 [AW]   1641 SpO2: 100 % [AW]      ED Course User Index  [AW] Liz Morel MD                    Clinical Impression:   Final diagnoses:  [Z3A.29] 29 weeks gestation of pregnancy  [O26.893, R10.9] Abdominal pain during pregnancy in third trimester (Primary)        ED Disposition Condition    Discharge Stable          ED Prescriptions    None       Follow-up Information    None          Liz Morel MD  Resident  10/07/23 1651       Siobhan Turcios MD  10/07/23 [id number]

## 2023-10-10 ENCOUNTER — OFFICE VISIT (OUTPATIENT)
Dept: PSYCHIATRY | Facility: CLINIC | Age: 33
End: 2023-10-10
Payer: COMMERCIAL

## 2023-10-10 VITALS
SYSTOLIC BLOOD PRESSURE: 96 MMHG | BODY MASS INDEX: 19.91 KG/M2 | WEIGHT: 138.75 LBS | DIASTOLIC BLOOD PRESSURE: 59 MMHG | HEART RATE: 81 BPM

## 2023-10-10 DIAGNOSIS — F41.1 GENERALIZED ANXIETY DISORDER: Primary | ICD-10-CM

## 2023-10-10 DIAGNOSIS — F40.240 CLAUSTROPHOBIA: ICD-10-CM

## 2023-10-10 PROCEDURE — 99214 PR OFFICE/OUTPT VISIT, EST, LEVL IV, 30-39 MIN: ICD-10-PCS | Mod: S$GLB,,, | Performed by: STUDENT IN AN ORGANIZED HEALTH CARE EDUCATION/TRAINING PROGRAM

## 2023-10-10 PROCEDURE — 99999 PR PBB SHADOW E&M-EST. PATIENT-LVL I: CPT | Mod: PBBFAC,,, | Performed by: STUDENT IN AN ORGANIZED HEALTH CARE EDUCATION/TRAINING PROGRAM

## 2023-10-10 PROCEDURE — 3074F PR MOST RECENT SYSTOLIC BLOOD PRESSURE < 130 MM HG: ICD-10-PCS | Mod: CPTII,S$GLB,, | Performed by: STUDENT IN AN ORGANIZED HEALTH CARE EDUCATION/TRAINING PROGRAM

## 2023-10-10 PROCEDURE — 3008F BODY MASS INDEX DOCD: CPT | Mod: CPTII,S$GLB,, | Performed by: STUDENT IN AN ORGANIZED HEALTH CARE EDUCATION/TRAINING PROGRAM

## 2023-10-10 PROCEDURE — 99999 PR PBB SHADOW E&M-EST. PATIENT-LVL I: ICD-10-PCS | Mod: PBBFAC,,, | Performed by: STUDENT IN AN ORGANIZED HEALTH CARE EDUCATION/TRAINING PROGRAM

## 2023-10-10 PROCEDURE — 3008F PR BODY MASS INDEX (BMI) DOCUMENTED: ICD-10-PCS | Mod: CPTII,S$GLB,, | Performed by: STUDENT IN AN ORGANIZED HEALTH CARE EDUCATION/TRAINING PROGRAM

## 2023-10-10 PROCEDURE — 99214 OFFICE O/P EST MOD 30 MIN: CPT | Mod: S$GLB,,, | Performed by: STUDENT IN AN ORGANIZED HEALTH CARE EDUCATION/TRAINING PROGRAM

## 2023-10-10 PROCEDURE — 3078F PR MOST RECENT DIASTOLIC BLOOD PRESSURE < 80 MM HG: ICD-10-PCS | Mod: CPTII,S$GLB,, | Performed by: STUDENT IN AN ORGANIZED HEALTH CARE EDUCATION/TRAINING PROGRAM

## 2023-10-10 PROCEDURE — 3078F DIAST BP <80 MM HG: CPT | Mod: CPTII,S$GLB,, | Performed by: STUDENT IN AN ORGANIZED HEALTH CARE EDUCATION/TRAINING PROGRAM

## 2023-10-10 PROCEDURE — 3074F SYST BP LT 130 MM HG: CPT | Mod: CPTII,S$GLB,, | Performed by: STUDENT IN AN ORGANIZED HEALTH CARE EDUCATION/TRAINING PROGRAM

## 2023-10-10 NOTE — PROGRESS NOTES
[facility]  DEPARTMENT OF PSYCHIATRY    ESTABLISHED OUTPATIENT VISIT     EXAMINING PRACTITIONER: Fanta Harden MD      KEY:     I[]I Y = II[x][]II = Yes / Present / Present Though Denies / Endorses  I[]I N = II[][x]II = No / Absent / Absent Though Endorses / Denies  I[]I U = II[][]II = Unknown / Unable to Assess/Enact / Unwilling to Participate/Provide  I[]I A = II[x][x]II = Ambiguity / Uncertainty of Accuracy Exists  I[]I D = Denial or Minimization is Suspected/Evident  I[]I N/A = Non-Applicable      CHIEF COMPLAINT:     Patient Name: Yadira Rodrigues  YOB: 1990  MRN: [id number]    Yadira Rodrigues is a 33 y.o. female who is being seen by me for a follow up visit.  Yadira Rodrigues presents with the chief complaint of: No chief complaint on file.    CHART REVIEW:     Available documentation has been reviewed, and pertinent elements of the chart have been incorporated into this evaluation where appropriate.    The patient's last encounter with me was on: 4/5/2023  The patient's last encounter in the psychiatry department was on: Visit date not found    PRESENTATION:     HPI, PSYCHIATRIC ROS & APPLICABLE MEDICAL ROS:   Pt last seen 4/2023. Presents for f/u is now 29 weeks pregnant. She got pregnant about 2 weeks after being seen so did not try the propanolol.   Pt does state she feels like she is having claustrophobia. She is having anxiety about giving birth. Has fear of planes, has had some fears about being trapped in various places.   She is having unrelenting nausea and is having a very difficult pregnancy. Denies feeling depressed currently, but struggling with episodic anxiety.   Possible hx of postpartum depression in her family- she is not sure. Discussed PPD, screening, and symptoms to look out for after delivery at great length.   She does not have a therapist now.   Discussed medication options. Pt at this time is reluctant to take a daily medication for anxiety but would be open  to it after delivery.   No thoughts of suicide. No HI, no AVH.       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 (Reviewed hypotension, other risks/benefits)      Specific phobia, BAKARI  - continue therapy. Pt reluctant to start daily medication- IE SSRI v TCA at this time as she is trying for a baby. Discussed limitations of data on safety in pregnancy extensively with patient, who is fairly risk averse. Discussed nortryptiline, mirtazipine.      RTC 4 weeks  Reviewed ED precautions  No SI, HI, AVH               .  CURRENT PSYCHOTROPIC REGIMEN:     none    HISTORY:     I[x]I Y  I[]I N  I[]I U  Psychiatric Diagnoses: phobia, generalized anxiety  I[]I Y  I[x]I N  I[]I U  Current Psychiatric Provider (if Applicable):   I[]I Y  I[x]I N  I[]I U  Hx of Psychiatric Hospitalization:   I[x]I Y  I[]I N  I[]I U  Hx of Outpatient Psychiatric Treatment (psychiatry/psychotherapy): therapy  I[x]I Y  I[]I N  I[]I U  Psychotropic Trials: lorazepam   I[]I Y  I[x]I N  I[]I U  Prior Suicide Attempts:   I[]I Y  I[x]I N  I[]I U  Hx of Suicidal Ideation:   I[]I Y  I[x]I N  I[]I U  Hx of Homicidal Ideation:   I[]I Y  I[x]I N  I[]I U  Hx of Self-Injurious Behavior (Non-Suicidal):   I[]I Y  I[x]I N  I[]I U  Hx of Violence:   I[]I Y  I[x]I N  I[]I U  Documented Hx of Malingering:   II[][x]II Grew Up Locally?: [place]  II[x][]II Happy Childhood?:   II[x][]II Significant Developmental Delay/Disability?:   II[x][]II GED/High School Dipoloma?:   II[x][]II Post High School Education?:   II[x][]II Currently Employed?:   II[][x]II On or Applying for Disability?:   II[x][]II Functions Independently?:   II[x][]II Financially Stable?:   II[x][]II Domiciled?:   II[x][]II Lives Alone?:   II[x][]II Heterosexual/Cisgender?:   II[x][]II Currently in a Romantic Relationship?:   II[x][]II Ever ?:   II[][x]II  Children/Dependents?:   II[x][]II Engaged in Hobbies/Recreational Activities?:   II[][x]II Access to a Gun?: denies      ASSESSMENT:     III[x]III  DIAGNOSES AND PROBLEMS:             .  Generalized anxiety disorder  Claustrophobia            .  PLAN:     IMPRESSION AND RECOMMENDATIONS:     MANAGEMENT PLAN, TREATMENT GOALS, THERAPEUTIC TECHNIQUES/APPROACHES & CLINICAL REASONING:    BAKARI, claustrophobia  Ms Rodrigues is a 32 y/o F who presents for f/u for BAKARI, claustrophobia. She is 29 weeks pregnant and struggling in her pregnancy. Pt is struggling with anxiety and claustrophobia and wanted to discuss her options leading up to her delivery and postpartum. Discussed this with pt's OB, who would prefer pt take an SSRI rather than PRN benzodiazepine pt has used in the past, both for fetal safety and for prevention of PPD. Discussed PPD and anticipatory guidance with pt who believes she is high risk (I agree). She denies depression currently though she has had mood swings. No decisions made at this time to initiate medication. Discussed upcoming maternity leave, ER precautions. No SI, HI, AVH.   RTC 6 weeks or sooner PRN                   .  III[x]III  PRESCRIPTION DRUG MANAGEMENT:     Prescription Drug Management entails the review, recommendation, or consideration without recommendation of medications, and as such was employed during the encounter.    Discussed, to the extent possible, diagnosis, risks and benefits of proposed treatment vs alternative treatments vs no treatment, potential side effects of these treatments and the inherent unpredictability of treatment. The patient expresses understanding of the above and displays the capacity to agree with this treatment given said understanding. Patient also agrees that, currently, the benefits outweigh the risks and consents to treatment at this time.     Written material has additionally been provided, via the AVS or other pre-printed handouts.      EXAMINATION:  "    VITALS:     BP (!) 96/59   Pulse 81   Wt 62.9 kg (138 lb 12.5 oz)   LMP 03/16/2023   BMI 19.91 kg/m²     MENTAL STATUS EXAMINATION:     Mental Status Exam:  Appearance: slim, tall, pregnant  Behavior/Cooperation: appropriate   Speech: appropriate rate, volume and tone, accent  Language: uses words appropriately; NO aphasia or dysarthria  Mood: "not good"  Affect:  congruent with mood and appropriate to situation/content   Thought Process: normal and logical  Thought Content: normal, no suicidality, no homicidality, delusions, or paranoia  Level of Consciousness: Alert and Oriented x3  Memory:  Intact  Attention/concentration: appropriate for age/education.   Fund of Knowledge: appears adequate  Insight:  Intact  Judgment: Intact      RISK:     MITIGATION:     Risk Mitigation Strategies, Harm Reduction Techniques, and Safety Netting are important interventions that can reduce acute and chronic risk.  Written material has been provided to supplement, augment, and reinforce any discussions and interventions, via the AVS or other pre-printed handouts.    PRESCRIPTION DRUG MONITORING:     LA/MS  AWARE  Site reviewed - No recent discrepancies or irregularities are noted.    MEDICAL DECISION MAKING:     Shared medical decision making and informed consent are the hallmark and bedrock of good clinical care, and as such have been employed and obtained, respectively, to the degree possible.  Written material has been provided to supplement, augment, and reinforce any discussions and interventions, via the AVS or other pre-printed handouts.    Additional psychoeducation has been provided, as warranted.      LEVEL OF MEDICAL DECISION MAKING AND TIME:     39 minutes    Fanta Harden MD  Department of Psychiatry  [facility]      DATA:     DIAGNOSTIC TESTING:     The chart was reviewed for recent diagnostic procedures and investigations, and pertinent results are noted below.    WEIGHTS & VITALS:     Wt Readings " from Last 2 Encounters:   10/10/23 62.9 kg (138 lb 12.5 oz)   09/27/23 62 kg (136 lb 11 oz)     BP Readings from Last 1 Encounters:   10/10/23 (!) 96/59     Pulse Readings from Last 1 Encounters:   10/10/23 81        PERTINENT LABORATORY RESULTS:     Blood Counts, Electrolytes & Glucose: (i.e. WBC, ANC, Hemoglobin, Hematocrit, MCV, Platelets)  Lab Results   Component Value Date    WBC 7.46 07/30/2023    GRAN 6.6 07/30/2023    GRAN 87.9 (H) 07/30/2023    HGB 11.2 (L) 07/30/2023    HCT 32.0 (L) 07/30/2023    MCV 91 07/30/2023     07/30/2023     (L) 07/30/2023    K 3.8 07/30/2023    CALCIUM 8.9 07/30/2023    PHOS 3.5 07/30/2023    MG 1.7 07/30/2023    CO2 19 (L) 07/30/2023    ANIONGAP 8 07/30/2023    GLU 87 07/30/2023       Renal, Liver, Pancreas, Thyroid, Parathyroid, Prolactin, CPK, Lipids & Vitamin Levels: (i.e. Cr, BUN, Anion Gap, GFR, Urine Specific Gravity, Urine Protein, Microalburnin, AST, ALT, GGT, Alk Phos,Total Bili, Total Protein, Albumin, Ammonia, INR, Amylase, Lipase, TSH, Total T3, Total T4, Free T4 PTH, Prolactin, CPK, Cholesterol, Triglycerides, LDH, HDL, Vitamin B12, Folate, Vitamin D)  Lab Results   Component Value Date    CREATININE 0.6 07/30/2023    BUN 5 (L) 07/30/2023    EGFRNORACEVR >60 07/30/2023    SPECGRAV 1.000 07/30/2023    AST 18 07/30/2023    ALT 15 07/30/2023    ALKPHOS 46 (L) 07/30/2023    BILITOT 0.8 07/30/2023    ALBUMIN 3.4 (L) 07/30/2023    TSH 1.337 08/01/2022    CHOL 142 02/11/2022    TRIG 42 02/11/2022    LDLCALC 68.6 02/11/2022    HDL 65 02/11/2022    [id number] 616 02/11/2022    FOLATE 11.4 02/11/2022       Infection Diseases, Pregnancy Screenings & Drug Levels: (i.e. Hepatitis Panel, HIV, Syphilis, Urine & Blood Pregnancy Screens, beta hCG, Lithium, Valproic Acid, Carbamazepine, Lamotrigine, Phenytoin, Phenobarbital, Clozapine, Norclozapine, Clozapine + Norclozapine)   Lab Results   Component Value Date    HEPCAB Non-reactive 04/27/2023    [id number] Non-reactive  "04/27/2023    RPR Non-reactive 04/27/2023    HCGQUANT <2.4 08/01/2022       Addiction: (i.e. Urine Toxicology, Blood Alcohol, PETH, EtG, EtS, CDT, Buprenorphine, Norbuprenorphine)  No results found for: "PCDSOALCOHOL", "PCDSOBENZOD", "BARBITURATES", "PCDSCOMETHA", "OPIATESCREEN", "COCAINEMETAB", "AMPHETAMINES", "MARIJUANATHC", "PCDSOPHENCYN", "PCDSUBUPRE", "PCDSUFENTANY", "PCDSUOXYCOD", "PCDSUTRAMA", "[id number]", "PETH", "ALCOHOLMEDIC", "THEYLGLUCU", "ETHYLSULF", "CDT", "BUPRENORPH", "NORBUPRENOR"    CARDIAC:     No results found for this or any previous visit.          "

## 2023-10-11 ENCOUNTER — PATIENT MESSAGE (OUTPATIENT)
Dept: PSYCHIATRY | Facility: CLINIC | Age: 33
End: 2023-10-11
Payer: COMMERCIAL

## 2023-10-12 ENCOUNTER — PATIENT MESSAGE (OUTPATIENT)
Dept: OTHER | Facility: OTHER | Age: 33
End: 2023-10-12
Payer: COMMERCIAL

## 2023-10-17 ENCOUNTER — ROUTINE PRENATAL (OUTPATIENT)
Dept: OBSTETRICS AND GYNECOLOGY | Facility: CLINIC | Age: 33
End: 2023-10-17
Payer: COMMERCIAL

## 2023-10-17 VITALS — BODY MASS INDEX: 20.09 KG/M2 | DIASTOLIC BLOOD PRESSURE: 62 MMHG | SYSTOLIC BLOOD PRESSURE: 110 MMHG | WEIGHT: 140 LBS

## 2023-10-17 DIAGNOSIS — Z34.02 ENCOUNTER FOR SUPERVISION OF NORMAL FIRST PREGNANCY IN SECOND TRIMESTER: Primary | ICD-10-CM

## 2023-10-17 PROCEDURE — 99999 PR PBB SHADOW E&M-EST. PATIENT-LVL III: CPT | Mod: PBBFAC,,, | Performed by: OBSTETRICS & GYNECOLOGY

## 2023-10-17 PROCEDURE — 0502F PR SUBSEQUENT PRENATAL CARE: ICD-10-PCS | Mod: CPTII,S$GLB,, | Performed by: OBSTETRICS & GYNECOLOGY

## 2023-10-17 PROCEDURE — 0502F SUBSEQUENT PRENATAL CARE: CPT | Mod: CPTII,S$GLB,, | Performed by: OBSTETRICS & GYNECOLOGY

## 2023-10-17 PROCEDURE — 99999 PR PBB SHADOW E&M-EST. PATIENT-LVL III: ICD-10-PCS | Mod: PBBFAC,,, | Performed by: OBSTETRICS & GYNECOLOGY

## 2023-10-17 NOTE — PROGRESS NOTES
Good fetal movement.  No contractions, no vaginal bleeding, and no loss of fluid.    Consents reviewed and signed today. Induction order placed for 12/20/23 midnight.

## 2023-10-26 ENCOUNTER — PATIENT MESSAGE (OUTPATIENT)
Dept: OTHER | Facility: OTHER | Age: 33
End: 2023-10-26
Payer: COMMERCIAL

## 2023-10-31 ENCOUNTER — LAB VISIT (OUTPATIENT)
Dept: LAB | Facility: OTHER | Age: 33
End: 2023-10-31
Attending: OBSTETRICS & GYNECOLOGY
Payer: COMMERCIAL

## 2023-10-31 ENCOUNTER — ROUTINE PRENATAL (OUTPATIENT)
Dept: OBSTETRICS AND GYNECOLOGY | Facility: CLINIC | Age: 33
End: 2023-10-31
Payer: COMMERCIAL

## 2023-10-31 VITALS — BODY MASS INDEX: 20.5 KG/M2 | DIASTOLIC BLOOD PRESSURE: 66 MMHG | SYSTOLIC BLOOD PRESSURE: 98 MMHG | WEIGHT: 142.88 LBS

## 2023-10-31 DIAGNOSIS — Z34.03 ENCOUNTER FOR SUPERVISION OF NORMAL FIRST PREGNANCY IN THIRD TRIMESTER: Primary | ICD-10-CM

## 2023-10-31 DIAGNOSIS — Z34.03 ENCOUNTER FOR SUPERVISION OF NORMAL FIRST PREGNANCY IN THIRD TRIMESTER: ICD-10-CM

## 2023-10-31 DIAGNOSIS — Z30.9 ENCOUNTER FOR CONTRACEPTIVE MANAGEMENT, UNSPECIFIED TYPE: ICD-10-CM

## 2023-10-31 LAB
BASOPHILS # BLD AUTO: 0.04 K/UL (ref 0–0.2)
BASOPHILS NFR BLD: 0.5 % (ref 0–1.9)
DIFFERENTIAL METHOD: ABNORMAL
EOSINOPHIL # BLD AUTO: 0.1 K/UL (ref 0–0.5)
EOSINOPHIL NFR BLD: 0.9 % (ref 0–8)
ERYTHROCYTE [DISTWIDTH] IN BLOOD BY AUTOMATED COUNT: 14 % (ref 11.5–14.5)
HCT VFR BLD AUTO: 36.7 % (ref 37–48.5)
HGB BLD-MCNC: 12.4 G/DL (ref 12–16)
HIV 1+2 AB+HIV1 P24 AG SERPL QL IA: NORMAL
IMM GRANULOCYTES # BLD AUTO: 0.11 K/UL (ref 0–0.04)
IMM GRANULOCYTES NFR BLD AUTO: 1.2 % (ref 0–0.5)
LYMPHOCYTES # BLD AUTO: 1.6 K/UL (ref 1–4.8)
LYMPHOCYTES NFR BLD: 17.9 % (ref 18–48)
MCH RBC QN AUTO: 31.9 PG (ref 27–31)
MCHC RBC AUTO-ENTMCNC: 33.8 G/DL (ref 32–36)
MCV RBC AUTO: 94 FL (ref 82–98)
MONOCYTES # BLD AUTO: 0.5 K/UL (ref 0.3–1)
MONOCYTES NFR BLD: 5.9 % (ref 4–15)
NEUTROPHILS # BLD AUTO: 6.5 K/UL (ref 1.8–7.7)
NEUTROPHILS NFR BLD: 73.6 % (ref 38–73)
NRBC BLD-RTO: 0 /100 WBC
PLATELET # BLD AUTO: 180 K/UL (ref 150–450)
PMV BLD AUTO: 10.1 FL (ref 9.2–12.9)
RBC # BLD AUTO: 3.89 M/UL (ref 4–5.4)
RPR SER QL: NORMAL
WBC # BLD AUTO: 8.86 K/UL (ref 3.9–12.7)

## 2023-10-31 PROCEDURE — 0502F PR SUBSEQUENT PRENATAL CARE: ICD-10-PCS | Mod: CPTII,S$GLB,, | Performed by: OBSTETRICS & GYNECOLOGY

## 2023-10-31 PROCEDURE — 0502F SUBSEQUENT PRENATAL CARE: CPT | Mod: CPTII,S$GLB,, | Performed by: OBSTETRICS & GYNECOLOGY

## 2023-10-31 PROCEDURE — 36415 COLL VENOUS BLD VENIPUNCTURE: CPT | Performed by: OBSTETRICS & GYNECOLOGY

## 2023-10-31 PROCEDURE — 86592 SYPHILIS TEST NON-TREP QUAL: CPT | Performed by: OBSTETRICS & GYNECOLOGY

## 2023-10-31 PROCEDURE — 85025 COMPLETE CBC W/AUTO DIFF WBC: CPT | Performed by: OBSTETRICS & GYNECOLOGY

## 2023-10-31 PROCEDURE — 99999 PR PBB SHADOW E&M-EST. PATIENT-LVL III: CPT | Mod: PBBFAC,,, | Performed by: OBSTETRICS & GYNECOLOGY

## 2023-10-31 PROCEDURE — 99999 PR PBB SHADOW E&M-EST. PATIENT-LVL III: ICD-10-PCS | Mod: PBBFAC,,, | Performed by: OBSTETRICS & GYNECOLOGY

## 2023-10-31 PROCEDURE — 87389 HIV-1 AG W/HIV-1&-2 AB AG IA: CPT | Performed by: OBSTETRICS & GYNECOLOGY

## 2023-10-31 NOTE — PROGRESS NOTES
Good fetal movement.  No contractions, no vaginal bleeding, and no loss of fluid.  Anesthesia consult arranged.  Labs today, patient reports feeling increased fatigue.  Long discussion about RSV vaccine and recommendations.  Counseled about contraception options.  Interested in a mirena.

## 2023-11-01 ENCOUNTER — OFFICE VISIT (OUTPATIENT)
Dept: ANESTHESIOLOGY | Facility: OTHER | Age: 33
End: 2023-11-01
Attending: OBSTETRICS & GYNECOLOGY
Payer: COMMERCIAL

## 2023-11-01 DIAGNOSIS — Z34.02 ENCOUNTER FOR SUPERVISION OF NORMAL FIRST PREGNANCY IN SECOND TRIMESTER: ICD-10-CM

## 2023-11-01 NOTE — CONSULTS
Consults    Outpatient OB Anesthesia Consult      Date and Time: 2023 9:37 AM     Request from: Dr. Benitez    CC requesting physician or midwife    Reason for Consult: Anesthetic recommendations for delivery    Initial Consult?: Yes    Chief Complaint: Anxiety    HPI: Patient is a 33 y.o. year old woman,  at 32w6d who presents in consult to discuss anesthetic options for delivery    Past medical history:    Past Medical History:   Diagnosis Date    Abnormal Pap smear of cervix        Past surgical history:    No past surgical history on file.    Family history:    Family History   Problem Relation Age of Onset    Hypertension Father     Hyperlipidemia Father     Prostate cancer Father         s/p resection    Hypertension Mother     Mental illness Sister     Mental illness Maternal Grandmother     Mental illness Paternal Grandmother     Cancer Maternal Uncle         lung?    Colon cancer Neg Hx     Breast cancer Neg Hx     Ovarian cancer Neg Hx        Social History:    Social History     Socioeconomic History    Marital status:    Tobacco Use    Smoking status: Never    Smokeless tobacco: Never   Substance and Sexual Activity    Alcohol use: Not Currently     Alcohol/week: 4.0 standard drinks of alcohol     Types: 4 Standard drinks or equivalent per week     Comment: drinks socially, occasionally     Drug use: Never    Sexual activity: Yes     Partners: Male     Birth control/protection: Spermicide       Medication:    Current Outpatient Medications on File Prior to Visit   Medication Sig Dispense Refill    doxylamine succinate (UNISOM, DOXYLAMINE,) 25 mg tablet       ondansetron (ZOFRAN-ODT) 4 MG TbDL Take 1 tablet (4 mg total) by mouth every 6 (six) hours as needed. 60 tablet 3    prenatal 25/iron fum/folic/dha (PRENATAL-1 ORAL) Take by mouth.       No current facility-administered medications on file prior to visit.       Allergies:    Codeine    Family or personal history of anesthetic  complications: No    Diagnostic Studies: I have reviewed the following relevant findings as noted below:  CBC:  Lab Results   Component Value Date    WBC 8.86 10/31/2023    HGB 12.4 10/31/2023    HCT 36.7 (L) 10/31/2023    MCV 94 10/31/2023     10/31/2023      CMP:  Sodium   Date Value Ref Range Status   07/30/2023 131 (L) 136 - 145 mmol/L Final     Potassium   Date Value Ref Range Status   07/30/2023 3.8 3.5 - 5.1 mmol/L Final     Chloride   Date Value Ref Range Status   07/30/2023 104 95 - 110 mmol/L Final     CO2   Date Value Ref Range Status   07/30/2023 19 (L) 23 - 29 mmol/L Final     Glucose   Date Value Ref Range Status   07/30/2023 87 70 - 110 mg/dL Final     BUN   Date Value Ref Range Status   07/30/2023 5 (L) 6 - 20 mg/dL Final     Creatinine   Date Value Ref Range Status   07/30/2023 0.6 0.5 - 1.4 mg/dL Final     Calcium   Date Value Ref Range Status   07/30/2023 8.9 8.7 - 10.5 mg/dL Final     Total Protein   Date Value Ref Range Status   07/30/2023 6.2 6.0 - 8.4 g/dL Final     Albumin   Date Value Ref Range Status   07/30/2023 3.4 (L) 3.5 - 5.2 g/dL Final     Total Bilirubin   Date Value Ref Range Status   07/30/2023 0.8 0.1 - 1.0 mg/dL Final     Comment:     For infants and newborns, interpretation of results should be based  on gestational age, weight and in agreement with clinical  observations.    Premature Infant recommended reference ranges:  Up to 24 hours.............<8.0 mg/dL  Up to 48 hours............<12.0 mg/dL  3-5 days..................<15.0 mg/dL  6-29 days.................<15.0 mg/dL       Alkaline Phosphatase   Date Value Ref Range Status   07/30/2023 46 (L) 55 - 135 U/L Final     AST   Date Value Ref Range Status   07/30/2023 18 10 - 40 U/L Final     ALT   Date Value Ref Range Status   07/30/2023 15 10 - 44 U/L Final     Anion Gap   Date Value Ref Range Status   07/30/2023 8 8 - 16 mmol/L Final     eGFR if    Date Value Ref Range Status   02/11/2022 >60 >60  mL/min/1.73 m^2 Final     eGFR if non    Date Value Ref Range Status   02/11/2022 >60 >60 mL/min/1.73 m^2 Final     Comment:     Calculation used to obtain the estimated glomerular filtration  rate (eGFR) is the CKD-EPI equation.        BMP:   Lab Results   Component Value Date     (L) 07/30/2023    K 3.8 07/30/2023     07/30/2023    CO2 19 (L) 07/30/2023    BUN 5 (L) 07/30/2023    CREATININE 0.6 07/30/2023    CALCIUM 8.9 07/30/2023    ANIONGAP 8 07/30/2023    ESTGFRAFRICA >60 02/11/2022    EGFRNONAA >60 02/11/2022       Review of Systems:   Constitutional: Negative for fever and chills; well appearing female  Eyes: no visual changes  Ear, nose, mouth and throat: no loose or broken teeth  Cardiovascular: no chest pain  Respiratory: no shortness of breath  Gastrointestinal: nausea during this pregnancy  Genitourinary: no dysuria  Musculoskeletal: no joint pain  Skin: warm and dry, no rashes  Neurologic: no seizures  Psychiatric: no anxiety or depression  Endocrinology: no heat or cold intolerance  Hematologic: does not bruise or bleed easily      Physical Exam:  Vitals:    See OB visit vitals  Constitutional: alert, no distress  Eyes: normal lids, pupils symmetric  Ear, nose, mouth and throat: Mallampati I, normal thyromental distance, normal lips, teeth, gums and tongue   Cardiovascular: normal rate, no murmurs  Respiratory: normal effort, no wheezes  Musculoskeletal: normal gait, normal range of motion  Skin: no rashes, no induration  Neurologic: normal reflexes and sensation  Psychiatric: oriented to person, place, time; normal affect    ASA Score: 2    Assessment and Plan:  33 year old woman with no significant medical history who has concerns about neuraxial anesthesia.   - Patient has no contraindications to neuraxial placement or nitrous. She has concerns about being confined to her bed after the epidural is placed as she gets extremely anxious when she has to remain in one place  for an extended period of time. I emphasized that this restriction is in place due to patient safety and she expressed understanding. She desires to delivery vaginally without an epidural.  - Nitrous oxide was discussed as an option as well. She inquired about the success rate of delivering with just nitrous and I told her that she would be most successful if she can wait until she is >6cm dilated.   - In the event that she needs a urgent  and does not have an epidural she will need to undergo general anesthesia. Risks of general anesthesia in the pregnant population discussed. All questions answered.     Complexity: Low    Aida Wilkinson MD

## 2023-11-09 ENCOUNTER — TELEPHONE (OUTPATIENT)
Dept: OBSTETRICS AND GYNECOLOGY | Facility: CLINIC | Age: 33
End: 2023-11-09
Payer: COMMERCIAL

## 2023-11-09 ENCOUNTER — HOSPITAL ENCOUNTER (EMERGENCY)
Facility: OTHER | Age: 33
Discharge: HOME | End: 2023-11-09
Attending: OBSTETRICS & GYNECOLOGY
Payer: COMMERCIAL

## 2023-11-09 ENCOUNTER — PATIENT MESSAGE (OUTPATIENT)
Dept: OBSTETRICS AND GYNECOLOGY | Facility: CLINIC | Age: 33
End: 2023-11-09
Payer: COMMERCIAL

## 2023-11-09 ENCOUNTER — NURSE TRIAGE (OUTPATIENT)
Dept: ADMINISTRATIVE | Facility: CLINIC | Age: 33
End: 2023-11-09
Payer: COMMERCIAL

## 2023-11-09 ENCOUNTER — PATIENT MESSAGE (OUTPATIENT)
Dept: PEDIATRICS | Facility: CLINIC | Age: 33
End: 2023-11-09
Payer: COMMERCIAL

## 2023-11-09 VITALS
TEMPERATURE: 99 F | OXYGEN SATURATION: 100 % | RESPIRATION RATE: 16 BRPM | HEART RATE: 76 BPM | DIASTOLIC BLOOD PRESSURE: 66 MMHG | SYSTOLIC BLOOD PRESSURE: 102 MMHG

## 2023-11-09 DIAGNOSIS — Z3A.34 34 WEEKS GESTATION OF PREGNANCY: ICD-10-CM

## 2023-11-09 DIAGNOSIS — O26.899 ABDOMINAL PAIN AFFECTING PREGNANCY, ANTEPARTUM: Primary | ICD-10-CM

## 2023-11-09 DIAGNOSIS — R10.9 ABDOMINAL PAIN AFFECTING PREGNANCY, ANTEPARTUM: Primary | ICD-10-CM

## 2023-11-09 LAB
BILIRUB SERPL-MCNC: NORMAL MG/DL
BLOOD URINE, POC: NORMAL
COLOR, POC UA: NORMAL
GLUCOSE UR QL STRIP: NORMAL
KETONES UR QL STRIP: NORMAL
LEUKOCYTE ESTERASE URINE, POC: NORMAL
NITRITE, POC UA: NORMAL
PH, POC UA: 7
PROTEIN, POC: NORMAL
SPECIFIC GRAVITY, POC UA: 1
UROBILINOGEN, POC UA: NORMAL

## 2023-11-09 PROCEDURE — 59025 PR FETAL 2N-STRESS TEST: ICD-10-PCS | Mod: 26,,, | Performed by: OBSTETRICS & GYNECOLOGY

## 2023-11-09 PROCEDURE — 76815 OB US LIMITED FETUS(S): CPT | Mod: 26,,, | Performed by: OBSTETRICS & GYNECOLOGY

## 2023-11-09 PROCEDURE — 76815 PR  US,PREGNANT UTERUS,LIMITED, 1/> FETUSES: ICD-10-PCS | Mod: 26,,, | Performed by: OBSTETRICS & GYNECOLOGY

## 2023-11-09 PROCEDURE — 99284 EMERGENCY DEPT VISIT MOD MDM: CPT | Mod: 25,,, | Performed by: OBSTETRICS & GYNECOLOGY

## 2023-11-09 PROCEDURE — 99284 EMERGENCY DEPT VISIT MOD MDM: CPT | Mod: 25

## 2023-11-09 PROCEDURE — 99284 PR EMERGENCY DEPT VISIT,LEVEL IV: ICD-10-PCS | Mod: 25,,, | Performed by: OBSTETRICS & GYNECOLOGY

## 2023-11-09 PROCEDURE — 59025 FETAL NON-STRESS TEST: CPT | Mod: 26,,, | Performed by: OBSTETRICS & GYNECOLOGY

## 2023-11-09 PROCEDURE — 81002 URINALYSIS NONAUTO W/O SCOPE: CPT

## 2023-11-09 PROCEDURE — 59025 FETAL NON-STRESS TEST: CPT

## 2023-11-09 NOTE — TELEPHONE ENCOUNTER
states that pt was c/o pain. States that pt hung up before transfer.     Call placed to pt; No answer. Encounter routed to provider.   Reason for Disposition   Caller has already spoken with another triager or PCP (or office), and has further questions and triager able to answer questions.    Protocols used: No Contact or Duplicate Contact Call-A-[place]

## 2023-11-09 NOTE — ED PROVIDER NOTES
Encounter Date: 2023       History     Chief Complaint   Patient presents with    Abdominal Pain     Ms. Rodrigues is a 32yo  at 34w0d with an IUP complicated by anxiety and persistent nausea (on daily zofran) here this evening with concern for RUQ pain since yesterday.  She reports pain that is constant in nature; located right underneath her ribs.  She denies N/V/F/C nor dysuria.  Pain is not exacerbated by PO intake. She cannot identify any relieving factors. She denies LOF, PV bleeding/spotting nor ctxs and reports good fetal movement.  She denies issues with PO intake nor changes to her bowel habits.  She has no other concerns at this time.        Review of patient's allergies indicates:   Allergen Reactions    Codeine Nausea And Vomiting     Past Medical History:   Diagnosis Date    Abnormal Pap smear of cervix      No past surgical history on file.  Family History   Problem Relation Age of Onset    Hypertension Father     Hyperlipidemia Father     Prostate cancer Father         s/p resection    Hypertension Mother     Mental illness Sister     Mental illness Maternal Grandmother     Mental illness Paternal Grandmother     Cancer Maternal Uncle         lung?    Colon cancer Neg Hx     Breast cancer Neg Hx     Ovarian cancer Neg Hx      Social History     Tobacco Use    Smoking status: Never    Smokeless tobacco: Never   Substance Use Topics    Alcohol use: Not Currently     Alcohol/week: 4.0 standard drinks of alcohol     Types: 4 Standard drinks or equivalent per week     Comment: drinks socially, occasionally     Drug use: Never     Review of Systems   Constitutional:  Negative for chills, diaphoresis and fever.   Respiratory:  Negative for cough and shortness of breath.    Cardiovascular:  Negative for chest pain and leg swelling.   Gastrointestinal:  Positive for abdominal pain. Negative for abdominal distention, constipation, diarrhea, nausea and vomiting.   Genitourinary:  Negative for dysuria,  hematuria, pelvic pain, vaginal bleeding, vaginal discharge and vaginal pain.   Musculoskeletal:  Negative for back pain.   Skin:  Negative for rash.   Neurological:  Negative for light-headedness and headaches.       Physical Exam     Initial Vitals [11/09/23 1715]   BP Pulse Resp Temp SpO2   106/66 90 16 98.9 °F (37.2 °C) 100 %      MAP       --       Temp:  [98.9 °F (37.2 °C)] 98.9 °F (37.2 °C)  Pulse:  [76-90] 76  Resp:  [16] 16  SpO2:  [96 %-100 %] 100 %  BP: (102-106)/(66) 102/66    Physical Exam    Nursing note and vitals reviewed.  Constitutional: She appears well-developed and well-nourished. She is not diaphoretic. No distress.   HENT:   Head: Normocephalic and atraumatic.   Eyes: EOM are normal.   Neck:   Normal range of motion.  Cardiovascular:  Normal rate.           Pulmonary/Chest: No respiratory distress.   Abdominal: She exhibits no distension and no mass. There is no abdominal tenderness. There is no rebound.   Musculoskeletal:         General: Normal range of motion.      Cervical back: Normal range of motion.     Neurological: She is alert and oriented to person, place, and time. She has normal strength.   Skin: Skin is warm and dry.   Psychiatric: She has a normal mood and affect. Her behavior is normal. Judgment and thought content normal.             ED Course   Obtain Fetal nonstress test (NST)    Date/Time: 11/9/2023 5:48 PM    Performed by: Duke Rollins MD  Authorized by: Nely Ferraro MD    Nonstress Test:     Variability:  6-25 BPM    Decelerations:  None    Accelerations:  10 bpm    Baseline:  150    Uterine Irritability: No      Contractions:  Not present  Biophysical Profile:     Nonstress Test Interpretation: reactive      Overall Impression:  Reassuring  Post-procedure:     Patient tolerance:  Patient tolerated the procedure well with no immediate complications    Labs Reviewed   POCT URINALYSIS, DIPSTICK OR TABLET REAGENT, AUTOMATED, WITH MICROSCOP          Imaging Results     None          Medications - No data to display  Medical Decision Making  Yadira Rodrigues is a 33 y.o. [id number] at 34w0d presents complaining of abdominal pain.     - VSS & WNL  - Udip: neg for infection, dehydration, blood. Unremarkable  - Physical exam benign, no rebound or guarding  - BSUS: active fetus, vertex presentation, fetal breech noted to be in RUQ adjacent to patients liver, likely source of discomfort  - Discomforts of pregnancy and reliefs discussed  - Stable for discharge home with outpt follow up    Amount and/or Complexity of Data Reviewed  Labs: ordered.              Attending Attestation:   Physician Attestation Statement for Resident:  As the supervising MD   Physician Attestation Statement: I have personally seen and examined this patient.   I agree with the above history.  -:   As the supervising MD I agree with the above PE.     As the supervising MD I agree with the above treatment, course, plan, and disposition.   -: I agree with the above edited resident note. Pt seen and examined, chart and labs reviewed.    Briefly, 32 yo  at 34w0d presenting for 2 days RUQ abd pain. VSS, normotensive, afebrile. Pain is constant in nature, non-radiating, and not exacerbated by PO intake. No change in appetite (pregnancy c/b persistant nausea on zofran, no change). No fevers/chills. No urinary symptoms. Pt denies VB/LOF, ctx and reports good fetal movements. On exam, abdomen benign, no rebound or guarding. BSS shows fetal breech in RUQ adjacent to pts pain. Suspect positional in nature given benign exam and stable vitals. Discussed Ddx to include: preE, cholecystitis, appendicitis, PTL, MSK. Of these, MSK most likely given benign abdomen, normal vitals, absent contractions. Pregnancy belt discussed as possible relief of pain, use of tylenol reviewed.     All questions answered. Stable for d/c home with outpatient follow up      Nely Ferraro MD  OB Hospitalist  2023     I was personally  present during the critical portions of the procedure(s) performed by the resident and was immediately available in the ED to provide services and assistance as needed during the entire procedure.  I have reviewed and agree with the residents interpretation of the following: lab data.  I have reviewed the following: old records at this facility.                                Clinical Impression:   Final diagnoses:  [O26.899, R10.9] Abdominal pain affecting pregnancy, antepartum (Primary)  [Z3A.34] 34 weeks gestation of pregnancy        ED Disposition Condition    Discharge Stable          ED Prescriptions    None       Follow-up Information    None          Nely Ferraro MD  11/09/23 2480

## 2023-11-09 NOTE — TELEPHONE ENCOUNTER
Patient reports RUQ pain.  Pain started a few days ago, but it started to increase last night.  Having trouble with walking, using her right side, or bending.  Not worse after eating.  No increased nausea or vomiting. No fever.  Does not have a BP cuff at home.  Does not have headaches, spots in vision.      Differential: gas, gallblader issues, rib pain, developing HELLP syndrome    Patient will try Gas X and if no relief, will go into GAIL.     Mary Grace Benitez

## 2023-11-15 ENCOUNTER — ROUTINE PRENATAL (OUTPATIENT)
Dept: OBSTETRICS AND GYNECOLOGY | Facility: CLINIC | Age: 33
End: 2023-11-15
Payer: COMMERCIAL

## 2023-11-15 VITALS
DIASTOLIC BLOOD PRESSURE: 80 MMHG | BODY MASS INDEX: 20.69 KG/M2 | SYSTOLIC BLOOD PRESSURE: 104 MMHG | WEIGHT: 144.19 LBS

## 2023-11-15 DIAGNOSIS — Z34.93 NORMAL PREGNANCY IN THIRD TRIMESTER: Primary | ICD-10-CM

## 2023-11-15 PROCEDURE — 99999 PR PBB SHADOW E&M-EST. PATIENT-LVL III: ICD-10-PCS | Mod: PBBFAC,,, | Performed by: OBSTETRICS & GYNECOLOGY

## 2023-11-15 PROCEDURE — 99999 PR PBB SHADOW E&M-EST. PATIENT-LVL III: CPT | Mod: PBBFAC,,, | Performed by: OBSTETRICS & GYNECOLOGY

## 2023-11-15 PROCEDURE — 0502F SUBSEQUENT PRENATAL CARE: CPT | Mod: CPTII,S$GLB,, | Performed by: OBSTETRICS & GYNECOLOGY

## 2023-11-15 PROCEDURE — 0502F PR SUBSEQUENT PRENATAL CARE: ICD-10-PCS | Mod: CPTII,S$GLB,, | Performed by: OBSTETRICS & GYNECOLOGY

## 2023-11-15 NOTE — PROGRESS NOTES
Good fetal movement.  No contractions, no vaginal bleeding, and no loss of fluid.  RSV vaccine today.  Questions answered regarding labor and delivery, epidural, IV, birth plan, etc.

## 2023-11-16 ENCOUNTER — PATIENT MESSAGE (OUTPATIENT)
Dept: OBSTETRICS AND GYNECOLOGY | Facility: CLINIC | Age: 33
End: 2023-11-16
Payer: COMMERCIAL

## 2023-11-27 ENCOUNTER — ROUTINE PRENATAL (OUTPATIENT)
Dept: OBSTETRICS AND GYNECOLOGY | Facility: CLINIC | Age: 33
End: 2023-11-27
Payer: COMMERCIAL

## 2023-11-27 VITALS
BODY MASS INDEX: 20.94 KG/M2 | HEART RATE: 78 BPM | SYSTOLIC BLOOD PRESSURE: 117 MMHG | DIASTOLIC BLOOD PRESSURE: 73 MMHG | WEIGHT: 145.94 LBS

## 2023-11-27 DIAGNOSIS — Z3A.36 36 WEEKS GESTATION OF PREGNANCY: Primary | ICD-10-CM

## 2023-11-27 PROCEDURE — 99999 PR PBB SHADOW E&M-EST. PATIENT-LVL III: ICD-10-PCS | Mod: PBBFAC,,,

## 2023-11-27 PROCEDURE — 99999 PR PBB SHADOW E&M-EST. PATIENT-LVL III: CPT | Mod: PBBFAC,,,

## 2023-11-27 PROCEDURE — 0502F SUBSEQUENT PRENATAL CARE: CPT | Mod: CPTII,S$GLB,,

## 2023-11-27 PROCEDURE — 87081 CULTURE SCREEN ONLY: CPT

## 2023-11-27 PROCEDURE — 0502F PR SUBSEQUENT PRENATAL CARE: ICD-10-PCS | Mod: CPTII,S$GLB,,

## 2023-11-27 NOTE — PROGRESS NOTES
Here for routine OB appt at 36w4d, with no complaints.  Reports good FM.  Denies LOF, denies VB, denies contractions. Having increased acid reflux- discussed Tums, Pepcid PRN. Discussed pillow support. Also increased nausea- continuing to take Zofran in the AM and Unisom in the PM. Discussed small frequent meals.   - GBS today   Reviewed warning signs of Labor and Preeclampsia.  Daily FM counts reinforced.  F/U scheduled 1 week with Dr. Benitez

## 2023-11-27 NOTE — PATIENT INSTRUCTIONS
Call L & D after hours at [phone] for vaginal bleeding, leakage of fluids, regular contractions every 5 mins for 2 hours, decreased fetal movements ( 10 kicks in 2 hours), headache not relieved by Tylenol, blurry vision, or temp of 100.4 or greater.  Begin doing fetal kick counts, at least 10 movements in 2 hours starting at 28 weeks gestation.  Keep next clinic appointment.

## 2023-11-30 LAB — BACTERIA SPEC AEROBE CULT: NORMAL

## 2023-12-05 ENCOUNTER — ROUTINE PRENATAL (OUTPATIENT)
Dept: OBSTETRICS AND GYNECOLOGY | Facility: CLINIC | Age: 33
End: 2023-12-05
Payer: COMMERCIAL

## 2023-12-05 VITALS
BODY MASS INDEX: 21.19 KG/M2 | DIASTOLIC BLOOD PRESSURE: 74 MMHG | WEIGHT: 147.69 LBS | SYSTOLIC BLOOD PRESSURE: 107 MMHG

## 2023-12-05 DIAGNOSIS — Z34.03 ENCOUNTER FOR SUPERVISION OF NORMAL FIRST PREGNANCY IN THIRD TRIMESTER: Primary | ICD-10-CM

## 2023-12-05 PROCEDURE — 99999 PR PBB SHADOW E&M-EST. PATIENT-LVL III: CPT | Mod: PBBFAC,,, | Performed by: OBSTETRICS & GYNECOLOGY

## 2023-12-05 PROCEDURE — 0502F PR SUBSEQUENT PRENATAL CARE: ICD-10-PCS | Mod: CPTII,S$GLB,, | Performed by: OBSTETRICS & GYNECOLOGY

## 2023-12-05 PROCEDURE — 99999 PR PBB SHADOW E&M-EST. PATIENT-LVL III: ICD-10-PCS | Mod: PBBFAC,,, | Performed by: OBSTETRICS & GYNECOLOGY

## 2023-12-05 PROCEDURE — 0502F SUBSEQUENT PRENATAL CARE: CPT | Mod: CPTII,S$GLB,, | Performed by: OBSTETRICS & GYNECOLOGY

## 2023-12-05 NOTE — PROGRESS NOTES
Good fetal movement.  No contractions, no vaginal bleeding, and no loss of fluid.  Reviewed induction/labor plans.  Questions answered. Will consider balloon if dilated enough.

## 2023-12-13 ENCOUNTER — HOSPITAL ENCOUNTER (OUTPATIENT)
Dept: PERINATAL CARE | Facility: OTHER | Age: 33
Discharge: HOME | End: 2023-12-13
Attending: OBSTETRICS & GYNECOLOGY
Payer: COMMERCIAL

## 2023-12-13 ENCOUNTER — ROUTINE PRENATAL (OUTPATIENT)
Dept: OBSTETRICS AND GYNECOLOGY | Facility: CLINIC | Age: 33
End: 2023-12-13
Payer: COMMERCIAL

## 2023-12-13 VITALS — BODY MASS INDEX: 21.38 KG/M2 | DIASTOLIC BLOOD PRESSURE: 70 MMHG | WEIGHT: 149.06 LBS | SYSTOLIC BLOOD PRESSURE: 96 MMHG

## 2023-12-13 DIAGNOSIS — O36.8130 DECREASED FETAL MOVEMENTS IN THIRD TRIMESTER, SINGLE OR UNSPECIFIED FETUS: ICD-10-CM

## 2023-12-13 DIAGNOSIS — Z34.83 ENCOUNTER FOR SUPERVISION OF OTHER NORMAL PREGNANCY IN THIRD TRIMESTER: Primary | ICD-10-CM

## 2023-12-13 PROCEDURE — 76815 OB US LIMITED FETUS(S): CPT

## 2023-12-13 PROCEDURE — 76815 OB US LIMITED FETUS(S): CPT | Mod: 26,,, | Performed by: OBSTETRICS & GYNECOLOGY

## 2023-12-13 PROCEDURE — 59025 FETAL NON-STRESS TEST: CPT

## 2023-12-13 PROCEDURE — 99999 PR PBB SHADOW E&M-EST. PATIENT-LVL III: CPT | Mod: PBBFAC,,, | Performed by: OBSTETRICS & GYNECOLOGY

## 2023-12-13 PROCEDURE — 0502F SUBSEQUENT PRENATAL CARE: CPT | Mod: CPTII,S$GLB,, | Performed by: OBSTETRICS & GYNECOLOGY

## 2023-12-13 PROCEDURE — 59025 FETAL NON-STRESS TEST: CPT | Mod: 26,,, | Performed by: OBSTETRICS & GYNECOLOGY

## 2023-12-13 PROCEDURE — 76815 PRENATAL TESTING - NST/AFI: ICD-10-PCS | Mod: 26,,, | Performed by: OBSTETRICS & GYNECOLOGY

## 2023-12-13 PROCEDURE — 99999 PR PBB SHADOW E&M-EST. PATIENT-LVL III: ICD-10-PCS | Mod: PBBFAC,,, | Performed by: OBSTETRICS & GYNECOLOGY

## 2023-12-13 PROCEDURE — 59025 PRENATAL TESTING - NST/AFI: ICD-10-PCS | Mod: 26,,, | Performed by: OBSTETRICS & GYNECOLOGY

## 2023-12-13 PROCEDURE — 0502F PR SUBSEQUENT PRENATAL CARE: ICD-10-PCS | Mod: CPTII,S$GLB,, | Performed by: OBSTETRICS & GYNECOLOGY

## 2023-12-13 NOTE — PROGRESS NOTES
Decreased fetal movement, still present but doesn't feel as active.  No contractions, no vaginal bleeding, and no loss of fluid.    Plan for mijares balloon next Tuesday, induction Wednesday.  Answered questions about labor and delivery.  Labor precautions.

## 2023-12-15 ENCOUNTER — ROUTINE PRENATAL (OUTPATIENT)
Dept: OBSTETRICS AND GYNECOLOGY | Facility: CLINIC | Age: 33
End: 2023-12-15
Payer: COMMERCIAL

## 2023-12-15 VITALS
WEIGHT: 147.25 LBS | DIASTOLIC BLOOD PRESSURE: 73 MMHG | BODY MASS INDEX: 21.13 KG/M2 | SYSTOLIC BLOOD PRESSURE: 117 MMHG

## 2023-12-15 DIAGNOSIS — Z34.03 ENCOUNTER FOR SUPERVISION OF NORMAL FIRST PREGNANCY IN THIRD TRIMESTER: Primary | ICD-10-CM

## 2023-12-15 PROCEDURE — 0502F SUBSEQUENT PRENATAL CARE: CPT | Mod: CPTII,S$GLB,, | Performed by: OBSTETRICS & GYNECOLOGY

## 2023-12-15 PROCEDURE — 99999 PR PBB SHADOW E&M-EST. PATIENT-LVL III: ICD-10-PCS | Mod: PBBFAC,,, | Performed by: OBSTETRICS & GYNECOLOGY

## 2023-12-15 PROCEDURE — 0502F PR SUBSEQUENT PRENATAL CARE: ICD-10-PCS | Mod: CPTII,S$GLB,, | Performed by: OBSTETRICS & GYNECOLOGY

## 2023-12-15 PROCEDURE — 99999 PR PBB SHADOW E&M-EST. PATIENT-LVL III: CPT | Mod: PBBFAC,,, | Performed by: OBSTETRICS & GYNECOLOGY

## 2023-12-15 NOTE — PROGRESS NOTES
Good fetal movement.  No contractions, no vaginal bleeding, and no loss of fluid.  Lost her mucous plug.  Labor precautions.

## 2023-12-19 ENCOUNTER — TELEPHONE (OUTPATIENT)
Dept: OBSTETRICS AND GYNECOLOGY | Facility: OTHER | Age: 33
End: 2023-12-19
Payer: COMMERCIAL

## 2023-12-19 ENCOUNTER — PATIENT MESSAGE (OUTPATIENT)
Dept: OBSTETRICS AND GYNECOLOGY | Facility: OTHER | Age: 33
End: 2023-12-19
Payer: COMMERCIAL

## 2023-12-19 ENCOUNTER — HOSPITAL ENCOUNTER (OUTPATIENT)
Dept: PERINATAL CARE | Facility: OTHER | Age: 33
Discharge: HOME | End: 2023-12-19
Attending: OBSTETRICS & GYNECOLOGY
Payer: COMMERCIAL

## 2023-12-19 DIAGNOSIS — O36.8130 DECREASED FETAL MOVEMENTS IN THIRD TRIMESTER, SINGLE OR UNSPECIFIED FETUS: ICD-10-CM

## 2023-12-19 PROCEDURE — 59200 PR INSERT CERVICAL DILATOR: ICD-10-PCS | Mod: ,,, | Performed by: OBSTETRICS & GYNECOLOGY

## 2023-12-19 PROCEDURE — 59200 INSERT CERVICAL DILATOR: CPT | Mod: ,,, | Performed by: OBSTETRICS & GYNECOLOGY

## 2023-12-19 PROCEDURE — 59025 FETAL NON-STRESS TEST: CPT

## 2023-12-19 NOTE — PROCEDURES
at 39w5d weeks presents for outpatient cervical ripening. She reports good fetal movement and denies contractions, loss of fluid, or vaginal bleeding.     Risks and benefits of outpatient cervical ripening discussed with patient and delivery consents previously signed. All questions answered.    Risks of outpatient cervical ripening include:    Vaginal bleeding    Vaginal and Abdominal pain  Infection    Premature Rupture of Membranes     BSUS confirmed vertex presentation.   SVE performed prior to balloon insertion. 250/-2. [place] balloon inserted without difficulty. Patient education provided to patient.      Plan: Patient to report to Prenatal Testing for NST. If reassuring NST, patient will be discharged home with plans to return at scheduled induction time of midnight tonight    Liz Morel MD  OB/GYN PGY1     Attending Attestation  I agree with the above edited resident note. Pt seen and examined, chart and labs reviewed.    Briefly, 32 yo  at 39w5d presenting for outpatient cervical ripening. BSS shows SIUP in cephalic presentation. SVE 50/-2. [place] placed and inflated with 30mL saline. IOL at midnight tonight.     All questions answered. Stable for d/c to PNT, then home if reassuring.    Nely Ferraro MD  OB Hospitalist  2023

## 2023-12-20 ENCOUNTER — HOSPITAL ENCOUNTER (INPATIENT)
Facility: OTHER | Age: 33
LOS: 2 days | Discharge: HOME | End: 2023-12-22
Attending: OBSTETRICS & GYNECOLOGY | Admitting: OBSTETRICS & GYNECOLOGY
Payer: COMMERCIAL

## 2023-12-20 ENCOUNTER — ANESTHESIA (OUTPATIENT)
Dept: OBSTETRICS AND GYNECOLOGY | Facility: OTHER | Age: 33
End: 2023-12-20
Payer: COMMERCIAL

## 2023-12-20 ENCOUNTER — ANESTHESIA EVENT (OUTPATIENT)
Dept: OBSTETRICS AND GYNECOLOGY | Facility: OTHER | Age: 33
End: 2023-12-20
Payer: COMMERCIAL

## 2023-12-20 DIAGNOSIS — Z34.90 ENCOUNTER FOR INDUCTION OF LABOR: ICD-10-CM

## 2023-12-20 DIAGNOSIS — Z34.02 ENCOUNTER FOR SUPERVISION OF NORMAL FIRST PREGNANCY IN SECOND TRIMESTER: ICD-10-CM

## 2023-12-20 LAB
ABO + RH BLD: NORMAL
BASOPHILS # BLD AUTO: 0.04 K/UL (ref 0–0.2)
BASOPHILS NFR BLD: 0.3 % (ref 0–1.9)
BLD GP AB SCN CELLS X3 SERPL QL: NORMAL
DIFFERENTIAL METHOD: ABNORMAL
EOSINOPHIL # BLD AUTO: 0.1 K/UL (ref 0–0.5)
EOSINOPHIL NFR BLD: 0.4 % (ref 0–8)
ERYTHROCYTE [DISTWIDTH] IN BLOOD BY AUTOMATED COUNT: 14.2 % (ref 11.5–14.5)
HCT VFR BLD AUTO: 34.5 % (ref 37–48.5)
HGB BLD-MCNC: 12.1 G/DL (ref 12–16)
IMM GRANULOCYTES # BLD AUTO: 0.1 K/UL (ref 0–0.04)
IMM GRANULOCYTES NFR BLD AUTO: 0.7 % (ref 0–0.5)
LYMPHOCYTES # BLD AUTO: 2.1 K/UL (ref 1–4.8)
LYMPHOCYTES NFR BLD: 15.2 % (ref 18–48)
MCH RBC QN AUTO: 32.5 PG (ref 27–31)
MCHC RBC AUTO-ENTMCNC: 35.1 G/DL (ref 32–36)
MCV RBC AUTO: 93 FL (ref 82–98)
MONOCYTES # BLD AUTO: 0.8 K/UL (ref 0.3–1)
MONOCYTES NFR BLD: 5.5 % (ref 4–15)
NEUTROPHILS # BLD AUTO: 10.5 K/UL (ref 1.8–7.7)
NEUTROPHILS NFR BLD: 77.9 % (ref 38–73)
NRBC BLD-RTO: 0 /100 WBC
PLATELET # BLD AUTO: 156 K/UL (ref 150–450)
PMV BLD AUTO: 11.2 FL (ref 9.2–12.9)
RBC # BLD AUTO: 3.72 M/UL (ref 4–5.4)
SPECIMEN OUTDATE: NORMAL
WBC # BLD AUTO: 13.52 K/UL (ref 3.9–12.7)

## 2023-12-20 PROCEDURE — 63600175 PHARM REV CODE 636 W HCPCS: Performed by: STUDENT IN AN ORGANIZED HEALTH CARE EDUCATION/TRAINING PROGRAM

## 2023-12-20 PROCEDURE — 25000003 PHARM REV CODE 250

## 2023-12-20 PROCEDURE — 59400 OBSTETRICAL CARE: CPT | Mod: GB,,, | Performed by: OBSTETRICS & GYNECOLOGY

## 2023-12-20 PROCEDURE — 25000003 PHARM REV CODE 250: Performed by: STUDENT IN AN ORGANIZED HEALTH CARE EDUCATION/TRAINING PROGRAM

## 2023-12-20 PROCEDURE — C1751 CATH, INF, PER/CENT/MIDLINE: HCPCS | Performed by: ANESTHESIOLOGY

## 2023-12-20 PROCEDURE — 27200710 HC EPIDURAL INFUSION PUMP SET: Performed by: ANESTHESIOLOGY

## 2023-12-20 PROCEDURE — 59409 PRA ETRICAL CARE,VAG DELIV ONLY: ICD-10-PCS | Mod: AA,P2,GC, | Performed by: ANESTHESIOLOGY

## 2023-12-20 PROCEDURE — 72100002 HC LABOR CARE, 1ST 8 HOURS

## 2023-12-20 PROCEDURE — 63600175 PHARM REV CODE 636 W HCPCS

## 2023-12-20 PROCEDURE — 59400 PR FULL ROUT OBSTE CARE,VAGINAL DELIV: ICD-10-PCS | Mod: GB,,, | Performed by: OBSTETRICS & GYNECOLOGY

## 2023-12-20 PROCEDURE — 72200005 HC VAGINAL DELIVERY LEVEL II

## 2023-12-20 PROCEDURE — 11000001 HC ACUTE MED/SURG PRIVATE ROOM

## 2023-12-20 PROCEDURE — 85025 COMPLETE CBC W/AUTO DIFF WBC: CPT | Performed by: STUDENT IN AN ORGANIZED HEALTH CARE EDUCATION/TRAINING PROGRAM

## 2023-12-20 PROCEDURE — 62326 NJX INTERLAMINAR LMBR/SAC: CPT

## 2023-12-20 PROCEDURE — 72100003 HC LABOR CARE, EA. ADDL. 8 HRS

## 2023-12-20 PROCEDURE — 59409 OBSTETRICAL CARE: CPT | Mod: AA,P2,GC, | Performed by: ANESTHESIOLOGY

## 2023-12-20 PROCEDURE — 86850 RBC ANTIBODY SCREEN: CPT | Performed by: STUDENT IN AN ORGANIZED HEALTH CARE EDUCATION/TRAINING PROGRAM

## 2023-12-20 RX ORDER — OXYCODONE HYDROCHLORIDE 10 MG/1
10 TABLET ORAL EVERY 4 HOURS PRN
Status: DISCONTINUED | OUTPATIENT
Start: 2023-12-20 | End: 2023-12-22 | Stop reason: HOSPADM

## 2023-12-20 RX ORDER — IBUPROFEN 600 MG/1
600 TABLET ORAL EVERY 6 HOURS
Status: DISCONTINUED | OUTPATIENT
Start: 2023-12-20 | End: 2023-12-22 | Stop reason: HOSPADM

## 2023-12-20 RX ORDER — OXYTOCIN 10 [USP'U]/ML
10 INJECTION, SOLUTION INTRAMUSCULAR; INTRAVENOUS ONCE AS NEEDED
Status: DISCONTINUED | OUTPATIENT
Start: 2023-12-20 | End: 2023-12-22 | Stop reason: HOSPADM

## 2023-12-20 RX ORDER — SIMETHICONE 80 MG
1 TABLET,CHEWABLE ORAL 4 TIMES DAILY PRN
Status: DISCONTINUED | OUTPATIENT
Start: 2023-12-20 | End: 2023-12-20

## 2023-12-20 RX ORDER — OXYTOCIN 10 [USP'U]/ML
10 INJECTION, SOLUTION INTRAMUSCULAR; INTRAVENOUS ONCE AS NEEDED
Status: DISCONTINUED | OUTPATIENT
Start: 2023-12-20 | End: 2023-12-20

## 2023-12-20 RX ORDER — SODIUM CHLORIDE 0.9 % (FLUSH) 0.9 %
10 SYRINGE (ML) INJECTION
Status: DISCONTINUED | OUTPATIENT
Start: 2023-12-20 | End: 2023-12-22 | Stop reason: HOSPADM

## 2023-12-20 RX ORDER — DIPHENOXYLATE HYDROCHLORIDE AND ATROPINE SULFATE 2.5; .025 MG/1; MG/1
2 TABLET ORAL EVERY 6 HOURS PRN
Status: DISCONTINUED | OUTPATIENT
Start: 2023-12-20 | End: 2023-12-22 | Stop reason: HOSPADM

## 2023-12-20 RX ORDER — PROCHLORPERAZINE EDISYLATE 5 MG/ML
5 INJECTION INTRAMUSCULAR; INTRAVENOUS EVERY 6 HOURS PRN
Status: DISCONTINUED | OUTPATIENT
Start: 2023-12-20 | End: 2023-12-20

## 2023-12-20 RX ORDER — DIPHENHYDRAMINE HCL 25 MG
25 CAPSULE ORAL EVERY 4 HOURS PRN
Status: DISCONTINUED | OUTPATIENT
Start: 2023-12-20 | End: 2023-12-22 | Stop reason: HOSPADM

## 2023-12-20 RX ORDER — OXYTOCIN/RINGER'S LACTATE 30/500 ML
95 PLASTIC BAG, INJECTION (ML) INTRAVENOUS ONCE
Status: DISCONTINUED | OUTPATIENT
Start: 2023-12-20 | End: 2023-12-22 | Stop reason: HOSPADM

## 2023-12-20 RX ORDER — METHYLERGONOVINE MALEATE 0.2 MG/ML
200 INJECTION INTRAVENOUS ONCE AS NEEDED
Status: DISCONTINUED | OUTPATIENT
Start: 2023-12-20 | End: 2023-12-22 | Stop reason: HOSPADM

## 2023-12-20 RX ORDER — ONDANSETRON 8 MG/1
8 TABLET, ORALLY DISINTEGRATING ORAL EVERY 8 HOURS PRN
Status: DISCONTINUED | OUTPATIENT
Start: 2023-12-20 | End: 2023-12-22 | Stop reason: HOSPADM

## 2023-12-20 RX ORDER — CALCIUM CARBONATE 200(500)MG
500 TABLET,CHEWABLE ORAL 3 TIMES DAILY PRN
Status: DISCONTINUED | OUTPATIENT
Start: 2023-12-20 | End: 2023-12-21

## 2023-12-20 RX ORDER — FENTANYL/BUPIVACAINE/NS/PF 2MCG/ML-.1
PLASTIC BAG, INJECTION (ML) INJECTION
Status: DISCONTINUED | OUTPATIENT
Start: 2023-12-20 | End: 2023-12-20

## 2023-12-20 RX ORDER — DIPHENOXYLATE HYDROCHLORIDE AND ATROPINE SULFATE 2.5; .025 MG/1; MG/1
2 TABLET ORAL EVERY 6 HOURS PRN
Status: DISCONTINUED | OUTPATIENT
Start: 2023-12-20 | End: 2023-12-20

## 2023-12-20 RX ORDER — LIDOCAINE HYDROCHLORIDE AND EPINEPHRINE 15; 5 MG/ML; UG/ML
INJECTION, SOLUTION EPIDURAL
Status: DISCONTINUED | OUTPATIENT
Start: 2023-12-20 | End: 2023-12-20

## 2023-12-20 RX ORDER — CARBOPROST TROMETHAMINE 250 UG/ML
250 INJECTION, SOLUTION INTRAMUSCULAR
Status: DISCONTINUED | OUTPATIENT
Start: 2023-12-20 | End: 2023-12-22 | Stop reason: HOSPADM

## 2023-12-20 RX ORDER — DOCUSATE SODIUM 100 MG/1
200 CAPSULE, LIQUID FILLED ORAL 2 TIMES DAILY PRN
Status: DISCONTINUED | OUTPATIENT
Start: 2023-12-20 | End: 2023-12-22 | Stop reason: HOSPADM

## 2023-12-20 RX ORDER — OXYTOCIN/RINGER'S LACTATE 30/500 ML
95 PLASTIC BAG, INJECTION (ML) INTRAVENOUS ONCE AS NEEDED
Status: DISCONTINUED | OUTPATIENT
Start: 2023-12-20 | End: 2023-12-21

## 2023-12-20 RX ORDER — METHYLERGONOVINE MALEATE 0.2 MG/ML
200 INJECTION INTRAVENOUS ONCE AS NEEDED
Status: DISCONTINUED | OUTPATIENT
Start: 2023-12-20 | End: 2023-12-20

## 2023-12-20 RX ORDER — OXYTOCIN/RINGER'S LACTATE 30/500 ML
334 PLASTIC BAG, INJECTION (ML) INTRAVENOUS ONCE AS NEEDED
Status: DISCONTINUED | OUTPATIENT
Start: 2023-12-20 | End: 2023-12-21

## 2023-12-20 RX ORDER — TRANEXAMIC ACID 10 MG/ML
1000 INJECTION, SOLUTION INTRAVENOUS EVERY 30 MIN PRN
Status: DISCONTINUED | OUTPATIENT
Start: 2023-12-20 | End: 2023-12-22 | Stop reason: HOSPADM

## 2023-12-20 RX ORDER — SODIUM CHLORIDE 9 MG/ML
INJECTION, SOLUTION INTRAVENOUS
Status: DISCONTINUED | OUTPATIENT
Start: 2023-12-20 | End: 2023-12-21

## 2023-12-20 RX ORDER — MISOPROSTOL 200 UG/1
800 TABLET ORAL ONCE AS NEEDED
Status: DISCONTINUED | OUTPATIENT
Start: 2023-12-20 | End: 2023-12-22 | Stop reason: HOSPADM

## 2023-12-20 RX ORDER — ONDANSETRON 8 MG/1
8 TABLET, ORALLY DISINTEGRATING ORAL EVERY 8 HOURS PRN
Status: DISCONTINUED | OUTPATIENT
Start: 2023-12-20 | End: 2023-12-20

## 2023-12-20 RX ORDER — DIPHENHYDRAMINE HYDROCHLORIDE 50 MG/ML
25 INJECTION INTRAMUSCULAR; INTRAVENOUS EVERY 4 HOURS PRN
Status: DISCONTINUED | OUTPATIENT
Start: 2023-12-20 | End: 2023-12-22 | Stop reason: HOSPADM

## 2023-12-20 RX ORDER — FAMOTIDINE 20 MG/1
20 TABLET, FILM COATED ORAL 2 TIMES DAILY
Status: DISCONTINUED | OUTPATIENT
Start: 2023-12-20 | End: 2023-12-21

## 2023-12-20 RX ORDER — TRANEXAMIC ACID 10 MG/ML
1000 INJECTION, SOLUTION INTRAVENOUS EVERY 30 MIN PRN
Status: DISCONTINUED | OUTPATIENT
Start: 2023-12-20 | End: 2023-12-20

## 2023-12-20 RX ORDER — SODIUM CHLORIDE, SODIUM LACTATE, POTASSIUM CHLORIDE, CALCIUM CHLORIDE 600; 310; 30; 20 MG/100ML; MG/100ML; MG/100ML; MG/100ML
INJECTION, SOLUTION INTRAVENOUS CONTINUOUS
Status: DISCONTINUED | OUTPATIENT
Start: 2023-12-20 | End: 2023-12-21

## 2023-12-20 RX ORDER — MISOPROSTOL 200 UG/1
800 TABLET ORAL ONCE AS NEEDED
Status: DISCONTINUED | OUTPATIENT
Start: 2023-12-20 | End: 2023-12-20

## 2023-12-20 RX ORDER — ACETAMINOPHEN 325 MG/1
650 TABLET ORAL EVERY 6 HOURS SCHEDULED
Status: DISCONTINUED | OUTPATIENT
Start: 2023-12-20 | End: 2023-12-22 | Stop reason: HOSPADM

## 2023-12-20 RX ORDER — OXYTOCIN/RINGER'S LACTATE 30/500 ML
0-32 PLASTIC BAG, INJECTION (ML) INTRAVENOUS CONTINUOUS
Status: DISCONTINUED | OUTPATIENT
Start: 2023-12-20 | End: 2023-12-21

## 2023-12-20 RX ORDER — PRENATAL WITH FERROUS FUM AND FOLIC ACID 3080; 920; 120; 400; 22; 1.84; 3; 20; 10; 1; 12; 200; 27; 25; 2 [IU]/1; [IU]/1; MG/1; [IU]/1; MG/1; MG/1; MG/1; MG/1; MG/1; MG/1; UG/1; MG/1; MG/1; MG/1; MG/1
1 TABLET ORAL DAILY
Status: DISCONTINUED | OUTPATIENT
Start: 2023-12-20 | End: 2023-12-22 | Stop reason: HOSPADM

## 2023-12-20 RX ORDER — PROCHLORPERAZINE EDISYLATE 5 MG/ML
5 INJECTION INTRAMUSCULAR; INTRAVENOUS EVERY 6 HOURS PRN
Status: DISCONTINUED | OUTPATIENT
Start: 2023-12-20 | End: 2023-12-22 | Stop reason: HOSPADM

## 2023-12-20 RX ORDER — HYDROCORTISONE 25 MG/G
CREAM TOPICAL 3 TIMES DAILY PRN
Status: DISCONTINUED | OUTPATIENT
Start: 2023-12-20 | End: 2023-12-22 | Stop reason: HOSPADM

## 2023-12-20 RX ORDER — LIDOCAINE HYDROCHLORIDE 10 MG/ML
10 INJECTION INFILTRATION; PERINEURAL ONCE AS NEEDED
Status: DISCONTINUED | OUTPATIENT
Start: 2023-12-20 | End: 2023-12-21

## 2023-12-20 RX ORDER — FENTANYL/BUPIVACAINE/NS/PF 2MCG/ML-.1
PLASTIC BAG, INJECTION (ML) INJECTION
Status: COMPLETED
Start: 2023-12-20 | End: 2023-12-20

## 2023-12-20 RX ORDER — OXYCODONE HYDROCHLORIDE 5 MG/1
5 TABLET ORAL EVERY 4 HOURS PRN
Status: DISCONTINUED | OUTPATIENT
Start: 2023-12-20 | End: 2023-12-22 | Stop reason: HOSPADM

## 2023-12-20 RX ORDER — CARBOPROST TROMETHAMINE 250 UG/ML
250 INJECTION, SOLUTION INTRAMUSCULAR
Status: DISCONTINUED | OUTPATIENT
Start: 2023-12-20 | End: 2023-12-20

## 2023-12-20 RX ORDER — SIMETHICONE 80 MG
1 TABLET,CHEWABLE ORAL EVERY 6 HOURS PRN
Status: DISCONTINUED | OUTPATIENT
Start: 2023-12-20 | End: 2023-12-22 | Stop reason: HOSPADM

## 2023-12-20 RX ADMIN — FAMOTIDINE 20 MG: 20 TABLET ORAL at 02:12

## 2023-12-20 RX ADMIN — ACETAMINOPHEN 650 MG: 325 TABLET, FILM COATED ORAL at 05:12

## 2023-12-20 RX ADMIN — PRENATAL VIT W/ FE FUMARATE-FA TAB 27-0.8 MG 1 TABLET: 27-0.8 TAB at 11:12

## 2023-12-20 RX ADMIN — IBUPROFEN 600 MG: 600 TABLET, FILM COATED ORAL at 11:12

## 2023-12-20 RX ADMIN — Medication 4 MILLI-UNITS/MIN: at 01:12

## 2023-12-20 RX ADMIN — LIDOCAINE HYDROCHLORIDE,EPINEPHRINE BITARTRATE 3 ML: 15; .005 INJECTION, SOLUTION EPIDURAL; INFILTRATION; INTRACAUDAL; PERINEURAL at 06:12

## 2023-12-20 RX ADMIN — ACETAMINOPHEN 650 MG: 325 TABLET, FILM COATED ORAL at 11:12

## 2023-12-20 RX ADMIN — SODIUM CHLORIDE, POTASSIUM CHLORIDE, SODIUM LACTATE AND CALCIUM CHLORIDE 1000 ML: 600; 310; 30; 20 INJECTION, SOLUTION INTRAVENOUS at 06:12

## 2023-12-20 RX ADMIN — IBUPROFEN 600 MG: 600 TABLET, FILM COATED ORAL at 05:12

## 2023-12-20 RX ADMIN — FAMOTIDINE 20 MG: 20 TABLET ORAL at 08:12

## 2023-12-20 RX ADMIN — SODIUM CHLORIDE, POTASSIUM CHLORIDE, SODIUM LACTATE AND CALCIUM CHLORIDE 125 ML/HR: 600; 310; 30; 20 INJECTION, SOLUTION INTRAVENOUS at 07:12

## 2023-12-20 RX ADMIN — ACETAMINOPHEN 650 MG: 325 TABLET, FILM COATED ORAL at 10:12

## 2023-12-20 RX ADMIN — Medication 8 ML/HR: at 06:12

## 2023-12-20 RX ADMIN — ONDANSETRON 8 MG: 8 TABLET, ORALLY DISINTEGRATING ORAL at 01:12

## 2023-12-20 RX ADMIN — Medication 2.5 ML: at 06:12

## 2023-12-20 RX ADMIN — IBUPROFEN 600 MG: 600 TABLET, FILM COATED ORAL at 10:12

## 2023-12-20 RX ADMIN — DOCUSATE SODIUM 200 MG: 100 CAPSULE, LIQUID FILLED ORAL at 08:12

## 2023-12-20 RX ADMIN — SODIUM CHLORIDE, POTASSIUM CHLORIDE, SODIUM LACTATE AND CALCIUM CHLORIDE: 600; 310; 30; 20 INJECTION, SOLUTION INTRAVENOUS at 12:12

## 2023-12-20 RX ADMIN — FAMOTIDINE 20 MG: 20 TABLET ORAL at 11:12

## 2023-12-20 NOTE — PLAN OF CARE
23 0831   OB SCREEN   Assessment Type Discharge Planning Brief Assessment   Source of Information health record   Received Prenatal Care Yes   Any indications/suspicions for None   Is this a teen pregnancy No   Is the baby in NICU No   Indication for adoption/Safe Haven No   Indication for DME/post-acute needs No   HIV (+) No   Any congenital  disorders No   Fetal demise/ death No     Patient has been screened for Social Work discharge planning needs. Based on documentation in medical record, no discharge planning needs are anticipated at this time. Should any discharge planning needs arise, please consult .

## 2023-12-20 NOTE — ANESTHESIA PROCEDURE NOTES
Epidural    Patient location during procedure: OB   Reason for block: primary anesthetic   Reason for block: labor analgesia requested by patient and obstetrician  Diagnosis: IUP   Start time: 12/20/2023 6:18 AM  Timeout: 12/20/2023 6:18 AM  End time: 12/20/2023 6:31 AM  Surgery related to: Vaginal Delivery    Staffing  Performing Provider: Henry Dubon DO  Authorizing Provider: Melonie Martinez MD    Staffing  Performed by: Henry Dubon DO  Authorized by: Melonie Martinez MD        Preanesthetic Checklist  Completed: patient identified, IV checked, site marked, risks and benefits discussed, surgical consent, monitors and equipment checked, pre-op evaluation, timeout performed, anesthesia consent given, hand hygiene performed and patient being monitored  Preparation  Patient position: sitting  Prep: ChloraPrep  Patient monitoring: Pulse Ox  Reason for block: primary anesthetic   Epidural  Skin Anesthetic: lidocaine 1%  Skin Wheal: 3 mL  Administration type: continuous  Approach: midline  Interspace: L3-4    Injection technique: ELEUTERIO saline  Needle and Epidural Catheter  Needle type: Tuohy   Needle gauge: 17  Needle length: 3.5 inches  Needle insertion depth: 5 cm  Catheter type: TripsByTips  Catheter size: 19 G  Catheter at skin depth: 9 cm    Test dose: 3 mL of lidocaine 1.5% with Epi 1-to-200,000  Additional Documentation: incremental injection, negative aspiration for heme and CSF, no paresthesia on injection, no signs/symptoms of IV or SA injection, no significant pain on injection and no significant complaints from patient  Needle localization: anatomical landmarks  Medications:  Volume per aspiration: 5 mL  Time between aspirations: 5 minutes   Assessment  Ease of block: easy  Patient's tolerance of the procedure: comfortable throughout block and no complaints

## 2023-12-20 NOTE — H&P
HISTORY AND PHYSICAL                                                OBSTETRICS          Subjective:       Yadira Rodrigues is a 33 y.o.  female with IUP at 39w6d weeks gestation who presents for IOL.    Patient denies contractions, denies vaginal bleeding, denies LOF.   Fetal Movement: normal.    This IUP is complicated by anxiety.    Review of Systems   Constitutional:  Negative for chills, fatigue and fever.   HENT:  Negative for nasal congestion.    Eyes:  Negative for visual disturbance.   Respiratory:  Negative for cough.    Cardiovascular:  Negative for chest pain and palpitations.   Gastrointestinal:  Negative for abdominal pain, bloating, diarrhea and nausea.   Genitourinary:  Negative for dysuria, pelvic pain, vaginal bleeding and vaginal discharge.   Musculoskeletal:  Negative for back pain, joint swelling and leg pain.   Neurological:  Negative for syncope and headaches.   Hematological:  Does not bruise/bleed easily.   Psychiatric/Behavioral:  The patient is not nervous/anxious.        PMHx:   Past Medical History:   Diagnosis Date    Abnormal Pap smear of cervix        PSHx: History reviewed. No pertinent surgical history.    All:   Review of patient's allergies indicates:   Allergen Reactions    Codeine Nausea And Vomiting       Meds:   Medications Prior to Admission   Medication Sig Dispense Refill Last Dose    doxylamine succinate (UNISOM, DOXYLAMINE,) 25 mg tablet        ondansetron (ZOFRAN-ODT) 4 MG TbDL Take 1 tablet (4 mg total) by mouth every 6 (six) hours as needed. 60 tablet 3     prenatal 25/iron fum/folic/dha (PRENATAL-1 ORAL) Take by mouth.       RSV, preF A and preF B,PF, (ABRYSVO) 120 mcg/0.5 mL SolR vaccine Inject into the muscle. 1 each 0        SH:   Social History     Socioeconomic History    Marital status:    Tobacco Use    Smoking status: Never    Smokeless tobacco: Never   Substance and Sexual Activity    Alcohol use: Not Currently     Alcohol/week: 4.0 standard  "drinks of alcohol     Types: 4 Standard drinks or equivalent per week     Comment: drinks socially, occasionally     Drug use: Never    Sexual activity: Yes     Partners: Male     Birth control/protection: Spermicide       FH:   Family History   Problem Relation Age of Onset    Hypertension Father     Hyperlipidemia Father     Prostate cancer Father         s/p resection    Hypertension Mother     Mental illness Sister     Mental illness Maternal Grandmother     Mental illness Paternal Grandmother     Cancer Maternal Uncle         lung?    Colon cancer Neg Hx     Breast cancer Neg Hx     Ovarian cancer Neg Hx        OBHx:   OB History    Para Term  AB Living   2 0 0 0 1 0   SAB IAB Ectopic Multiple Live Births   0 0 0 0 0      # Outcome Date GA Lbr Bar/2nd Weight Sex Delivery Anes PTL Lv   2 Current            1 AB 2017    U    FD       Objective:       BP 92/61   Pulse 70   Resp 16   Ht 5' 10" (1.778 m)   Wt 66.7 kg (147 lb)   LMP 2023   SpO2 100%   Breastfeeding No   BMI 21.09 kg/m²     Vitals:    23 0100 23 0119 23 0120 23 0127   BP: 116/71 92/61     Pulse: 87 64 70    Resp: 16      SpO2:   100%    Weight:    66.7 kg (147 lb)   Height:    5' 10" (1.778 m)       General:   alert, appears stated age and cooperative, no apparent distress   HENT:  normocephalic, atraumatic   Eyes:  extraocular movements and conjunctivae normal   Neck:  supple, range of motion normal, no thyromegaly   Lungs:   no respiratory distress   Heart:   regular rate   Abdomen:  soft, non-tender, non-distended but gravid, no rebound or guarding    Extremities negative edema, negative erythema   FHT: 150, moderate BTBV, +accels, -decels;  Cat 1 (reassuring)                 TOCO: No ctx   Presentations: cephalic by ultrasound   Cervix:     Dilation: 4    Effacement: 75%    Station:  -3    Consistency: soft    Position: middle       EFW by Leopold's: 6    Recent Growth Scan: none    Lab " Review  Blood Type O POS  GBBS: negative  Rubella: Immune  RPR: nr  HIV: negative  HepB: negative       Assessment:       39w6d weeks gestation admitted for IOL.    Active Hospital Problems    Diagnosis  POA    *Encounter for induction of labor [Z34.90]  Not Applicable      Resolved Hospital Problems   No resolved problems to display.          Plan:     Induction of Labor   - Risks, benefits, alternatives and possible complications have been discussed in detail with the patient.   - Consents signed and to chart  - Admit to Labor and Delivery unit  - Epidural per Anesthesia  - Draw CBC, T&S  - Notify Staff  - Maternal pelvis unproven  - Recheck in 4 hrs or PRN  - s/p OP mijares  - Plan for pitocin for IOL    Contraception  - Interval IUD with MS    Anxiety   - stable mood on admission    Post-Partum Hemorrhage risk - low    Ruddy Sandy MD  Obstetrics and Gynecology- PGY1

## 2023-12-20 NOTE — L&D DELIVERY NOTE
[facility] - Labor & Delivery  Vaginal Delivery   Operative Note    SUMMARY     After approximately 20-30 minutes of maternal effort, normal spontaneous vaginal delivery of live infant, was placed on mothers abdomen for skin to skin and bulb suctioning performed.  Infant delivered position OA over intact perineum.  Nuchal cord: No.    Spontaneous delivery of placenta and IV pitocin given noting good uterine tone.  Periclitoral laceration repaired with 3-0 vicryl. A red rubber was placed during repair with 100cc of urine output. 2nd degree laceration noted and repaired in normal fashion with 2-0 vicryl .  Patient tolerated delivery well. Sponge needle and lap counted correctly x2.    QBL 444cc    Selena العراقي MD PGY1  Obstetrics and Gynecology    Indications:  (spontaneous vaginal delivery)  Pregnancy complicated by:   Patient Active Problem List   Diagnosis    Consumes a vegan diet    History of anemia    Situational anxiety    Amenorrhea    Normal pregnancy in third trimester     (spontaneous vaginal delivery)     Admitting GA: 39w6d    Delivery Information for Breana Rodrigues    Birth information:  YOB: 2023   Time of birth: 8:51 AM   Sex: female   Head Delivery Date/Time: 2023  8:51 AM   Delivery type: Vaginal, Spontaneous   Gestational Age: 39w6d        Delivery Providers    Delivering clinician: Mary Grace Benitez MD   Provider Role    Selena العراقي MD Resident    Shae Goode RN Registered Nurse    Tanvi Hutchison RN Registered Nurse    Elida Wilhelm RN Registered Nurse              Measurements    Weight:   Length:          Apgars    Living status: Living  Apgar Component Scores:  1 min.:  5 min.:  10 min.:  15 min.:  20 min.:    Skin color:  1  1       Heart rate:  2  2       Reflex irritability:  2  2       Muscle tone:  2  2       Respiratory effort:  2  2       Total:  9  9       Apgars assigned by: LOIDA HUTCHISON RN         Operative Delivery    Forceps  attempted?: No  Vacuum extractor attempted?: No         Shoulder Dystocia    Shoulder dystocia present?: No           Presentation    Presentation: Vertex           Interventions/Resuscitation    Method: Tactile Stimulation, Bulb Suctioning       Cord    Vessels: 3 vessels  Complications: None  Delayed Cord Clamping?: Yes  Cord Clamped Date/Time: 2023  8:52 AM       Placenta    Placenta delivery date/time: 2023 0859  Placenta removal: Spontaneous  Placenta appearance: Intact  Placenta disposition: Discarded           Labor Events:       labor: No     Labor Onset Date/Time: 2023 05:00     Dilation Complete Date/Time: 2023 07:30     Start Pushing Date/Time: 2023 08:24       Start Pushing Date/Time: 2023 08:24     Rupture Date/Time: 23  0500         Rupture type: ARM (Artificial Rupture)         Fluid Amount:       Fluid Color: Clear               steroids: None     Antibiotics given for GBS: No     Induction: balloon dilation ([place]);oxytocin;amniotomy     Indications for induction:  Elective     Augmentation:       Indications for augmentation:       Labor complications: None     Additional complications:          Cervical ripening:                     Delivery:      Episiotomy: None     Indication for Episiotomy:       Perineal Lacerations: 2nd Repaired:  Yes   Periurethral Laceration:   Repaired: Yes   Labial Laceration:   Repaired:     Sulcus Laceration:   Repaired:     Vaginal Laceration:   Repaired:     Cervical Laceration:   Repaired:     Repair suture:       Repair # of packets:       Last Value - EBL - Nursing (mL):       Sum - EBL - Nursing (mL): 0     Last Value - EBL - Anesthesia (mL):      Calculated QBL (mL): 444      Vaginal Sweep Performed: Yes     Surgicount Correct:       Vaginal Packing:   Quantity:       Other providers:       Anesthesia    Method: Epidural          Details (if applicable):  Trial of Labor       Categorization:      Priority:     Indications for :     Incision Type:       Additional  information:  Forceps:    Vacuum:    Breech:    Observed anomalies    Other (Comments):

## 2023-12-20 NOTE — ANESTHESIA PREPROCEDURE EVALUATION
[facility]  Obstetric Anesthesia Pre-Procedure Evaluation         Patient Name: Yadira Rodrigues  YOB: 1990  MRN: [id number]    2023    SUBJECTIVE:     Yadira Rodrigues is a 33 y.o. female  at 39w6d who presents for scheduled term IOL. Current IUP has been uncomplicated with the exception of significant maternal anxiety.     Given her anxiety, she was seen in consult by OB Anesthesiology with the following recommendations noted:  - Patient has no contraindications to neuraxial placement or nitrous. She has concerns about being confined to her bed after the epidural is placed as she gets extremely anxious when she has to remain in one place for an extended period of time. I emphasized that this restriction is in place due to patient safety and she expressed understanding. She desires to delivery vaginally without an epidural.  - Nitrous oxide was discussed as an option as well. She inquired about the success rate of delivering with just nitrous and I told her that she would be most successful if she can wait until she is >6cm dilated.   - In the event that she needs a urgent  and does not have an epidural she will need to undergo general anesthesia. Risks of general anesthesia in the pregnant population discussed. All questions answered.     She denies previous neuraxial anesthesia.   She denies previous general anesthesia.    She denies history of HTN, asthma, bleeding or coagulation disorders, spine abnormalities, or previous back surgeries.      OB History    Para Term  AB Living   2       1     SAB IAB Ectopic Multiple Live Births                  # Outcome Date GA Lbr Bar/2nd Weight Sex Delivery Anes PTL Lv   2 Current            1 AB 2017    U    FD       Review of patient's allergies indicates:   Allergen Reactions    Codeine Nausea And Vomiting       Patient Active Problem List   Diagnosis    Consumes a vegan diet    History of anemia     Situational anxiety    Amenorrhea    Normal pregnancy in third trimester    Encounter for induction of labor       History reviewed. No pertinent surgical history.    Tobacco Use: Low Risk  (12/20/2023)    Patient History     Smoking Tobacco Use: Never     Smokeless Tobacco Use: Never     Passive Exposure: Not on file     Alcohol Use: Not on file     Social History     Substance and Sexual Activity   Drug Use Never       OBJECTIVE:     Vital Signs:         Wt Readings from Last 1 Encounters:   12/15/23 1108 66.8 kg (147 lb 4.3 oz)       BP Readings from Last 3 Encounters:   12/15/23 117/73   12/13/23 96/70   12/05/23 107/74       Significant Labs    Heme Profile  Lab Results   Component Value Date    WBC 13.52 (H) 12/20/2023    HGB 12.1 12/20/2023    HCT 34.5 (L) 12/20/2023     12/20/2023       BMP  Lab Results   Component Value Date     (L) 07/30/2023    K 3.8 07/30/2023     07/30/2023    CO2 19 (L) 07/30/2023    BUN 5 (L) 07/30/2023    CREATININE 0.6 07/30/2023    MG 1.7 07/30/2023    PHOS 3.5 07/30/2023       Liver Function Tests  Lab Results   Component Value Date    AST 18 07/30/2023    ALT 15 07/30/2023    ALKPHOS 46 (L) 07/30/2023    BILITOT 0.8 07/30/2023    PROT 6.2 07/30/2023    ALBUMIN 3.4 (L) 07/30/2023       Endocrine Profile  Lab Results   Component Value Date    TSH 1.337 08/01/2022       ASSESSMENT/PLAN:       Pre-op Assessment          Review of Systems      Physical Exam  General: Well nourished, Cooperative, Alert and Oriented    Airway:  Mallampati: II   Mouth Opening: Normal  TM Distance: Normal  Tongue: Normal  Neck ROM: Normal ROM    Chest/Lungs:  Normal Respiratory Rate    Heart:  Rate: Normal    Anesthesia Plan  Type of Anesthesia, risks & benefits discussed:    Anesthesia Type: Epidural, CSE, Spinal, Gen ETT  Intra-op Monitoring Plan: Standard ASA Monitors  Post Op Pain Control Plan: multimodal analgesia, IV/PO Opioids PRN and epidural analgesia  ASA Score:  2  Day of Surgery Review of History & Physical: H&P Update referred to the surgeon/provider.    Ready For Surgery From Anesthesia Perspective.   .

## 2023-12-20 NOTE — PROGRESS NOTES
C/C/+1  Tachysystole with deceleration x3 minutes with recovery to baseline with pitocin off and improvement in blood pressure  Primary OBGYN notified

## 2023-12-20 NOTE — LACTATION NOTE
12/20/23 1700   Pain/Comfort/Sleep   Preferred Pain Scale number (Numeric Rating Pain Scale)   Comfort/Acceptable Pain Level 4   Pain Body Location perineum   Pain Rating (0-10): Rest 3   Pain Rating (0-10): Activity 3   Quality aching;sore   Pain Reassessment   Pain Rating Prior to Med Admin 3   Breasts WDL   Breast WDL WDL   Breast Pumping   Breast Pumping hand expression utilized   Maternal Feeding Assessment   Maternal Emotional State assist needed   Infant Positioning clutch/football;cross-cradle   Signs of Milk Transfer infant jaw motion present   Pain with Feeding yes  (inital latch good tugs and pulls, click and dimple after a few sucks)   Comfort Measures Before/During Feeding infant position adjusted;latch adjusted;maternal position adjusted   Latch Assistance yes   Reproductive Interventions   Breast Care: Breastfeeding open to air   Breastfeeding Assistance feeding cue recognition promoted;feeding on demand promoted;assisted with positioning;hand expression verified;infant latch-on verified;feeding session observed;infant suck/swallow verified;support offered   Breastfeeding Support maternal rest encouraged;maternal nutrition promoted;maternal hydration promoted;lactation counseling provided;infant-mother separation minimized;encouragement provided;diary/feeding log utilized   Safety   Safety WDL WDL   Safety Factors ID band on;upper side rails raised x 2;call light in reach;wheels locked;bed in low position   Safety Management   Patient Rounds visualized patient;ID band on;clutter free environment maintained;call light in patient/parent reach;bed wheels locked;bed in low position   Safety Bands on Patient Caregiver Band;Infant Security Band     Lactation note: Basic breastfeeding information reviewed. Latch assistance, football hold, unsuccessful, cross cradle hold right breast opens wide and latches, good tugs and pulls, c/o tenderness with feeding as feeding progressed noted cheek dimples and  clicks, latch detached , re latched, nipple long and round.

## 2023-12-20 NOTE — PROGRESS NOTES
"LABOR NOTE    S:  Complaints: No.  Epidural working:  no    O: BP (!) 113/58   Pulse 84   Temp 98.5 °F (36.9 °C) (Oral)   Resp 16   Ht 5' 10" (1.778 m)   Wt 66.7 kg (147 lb)   LMP 03/16/2023   SpO2 100%   Breastfeeding No   BMI 21.09 kg/m²     FHT: 145 Cat 1 (reassuring)  CTX: q 1-2 minutes, pit @ 20   SVE: 5/70/-2    Timeline:  0500: 5/70/-2, AROM cl    PLAN:    Continue Close Maternal/Fetal Monitoring  Pitocin Augmentation per protocol  Recheck 2 hours or PRN    Ruddy Sandy MD  Obstetrics and Gynecology- PGY1     "

## 2023-12-21 LAB
BASOPHILS # BLD AUTO: 0.04 K/UL (ref 0–0.2)
BASOPHILS NFR BLD: 0.3 % (ref 0–1.9)
DIFFERENTIAL METHOD: ABNORMAL
EOSINOPHIL # BLD AUTO: 0.1 K/UL (ref 0–0.5)
EOSINOPHIL NFR BLD: 0.4 % (ref 0–8)
ERYTHROCYTE [DISTWIDTH] IN BLOOD BY AUTOMATED COUNT: 14.3 % (ref 11.5–14.5)
HCT VFR BLD AUTO: 27.3 % (ref 37–48.5)
HGB BLD-MCNC: 9.4 G/DL (ref 12–16)
IMM GRANULOCYTES # BLD AUTO: 0.1 K/UL (ref 0–0.04)
IMM GRANULOCYTES NFR BLD AUTO: 0.8 % (ref 0–0.5)
LYMPHOCYTES # BLD AUTO: 2 K/UL (ref 1–4.8)
LYMPHOCYTES NFR BLD: 15.2 % (ref 18–48)
MCH RBC QN AUTO: 32.1 PG (ref 27–31)
MCHC RBC AUTO-ENTMCNC: 34.4 G/DL (ref 32–36)
MCV RBC AUTO: 93 FL (ref 82–98)
MONOCYTES # BLD AUTO: 0.5 K/UL (ref 0.3–1)
MONOCYTES NFR BLD: 4.1 % (ref 4–15)
NEUTROPHILS # BLD AUTO: 10.2 K/UL (ref 1.8–7.7)
NEUTROPHILS NFR BLD: 79.2 % (ref 38–73)
NRBC BLD-RTO: 0 /100 WBC
PLATELET # BLD AUTO: 133 K/UL (ref 150–450)
PMV BLD AUTO: 10.9 FL (ref 9.2–12.9)
RBC # BLD AUTO: 2.93 M/UL (ref 4–5.4)
WBC # BLD AUTO: 12.87 K/UL (ref 3.9–12.7)

## 2023-12-21 PROCEDURE — 85025 COMPLETE CBC W/AUTO DIFF WBC: CPT | Performed by: OBSTETRICS & GYNECOLOGY

## 2023-12-21 PROCEDURE — 11000001 HC ACUTE MED/SURG PRIVATE ROOM

## 2023-12-21 PROCEDURE — 25000003 PHARM REV CODE 250: Performed by: STUDENT IN AN ORGANIZED HEALTH CARE EDUCATION/TRAINING PROGRAM

## 2023-12-21 PROCEDURE — 36415 COLL VENOUS BLD VENIPUNCTURE: CPT | Performed by: OBSTETRICS & GYNECOLOGY

## 2023-12-21 PROCEDURE — 25000003 PHARM REV CODE 250: Performed by: ADVANCED PRACTICE MIDWIFE

## 2023-12-21 RX ORDER — LANOLIN ALCOHOL/MO/W.PET/CERES
1 CREAM (GRAM) TOPICAL 2 TIMES DAILY
Status: DISCONTINUED | OUTPATIENT
Start: 2023-12-21 | End: 2023-12-21

## 2023-12-21 RX ORDER — FAMOTIDINE 10 MG/ML
20 INJECTION INTRAVENOUS ONCE
Status: DISCONTINUED | OUTPATIENT
Start: 2023-12-21 | End: 2023-12-21

## 2023-12-21 RX ORDER — SODIUM CITRATE AND CITRIC ACID MONOHYDRATE 334; 500 MG/5ML; MG/5ML
30 SOLUTION ORAL ONCE
Status: DISCONTINUED | OUTPATIENT
Start: 2023-12-21 | End: 2023-12-21

## 2023-12-21 RX ORDER — FENTANYL/BUPIVACAINE/NS/PF 2MCG/ML-.1
PLASTIC BAG, INJECTION (ML) INJECTION CONTINUOUS
Status: DISCONTINUED | OUTPATIENT
Start: 2023-12-21 | End: 2023-12-21

## 2023-12-21 RX ORDER — LANOLIN ALCOHOL/MO/W.PET/CERES
1 CREAM (GRAM) TOPICAL 2 TIMES DAILY
Status: DISCONTINUED | OUTPATIENT
Start: 2023-12-21 | End: 2023-12-22 | Stop reason: HOSPADM

## 2023-12-21 RX ADMIN — ACETAMINOPHEN 650 MG: 325 TABLET, FILM COATED ORAL at 10:12

## 2023-12-21 RX ADMIN — IBUPROFEN 600 MG: 600 TABLET, FILM COATED ORAL at 04:12

## 2023-12-21 RX ADMIN — IBUPROFEN 600 MG: 600 TABLET, FILM COATED ORAL at 11:12

## 2023-12-21 RX ADMIN — ACETAMINOPHEN 650 MG: 325 TABLET, FILM COATED ORAL at 04:12

## 2023-12-21 RX ADMIN — FERROUS SULFATE TAB 325 MG (65 MG ELEMENTAL FE) 1 EACH: 325 (65 FE) TAB at 10:12

## 2023-12-21 RX ADMIN — IBUPROFEN 600 MG: 600 TABLET, FILM COATED ORAL at 10:12

## 2023-12-21 RX ADMIN — ACETAMINOPHEN 650 MG: 325 TABLET, FILM COATED ORAL at 11:12

## 2023-12-21 RX ADMIN — DOCUSATE SODIUM 200 MG: 100 CAPSULE, LIQUID FILLED ORAL at 08:12

## 2023-12-21 RX ADMIN — PRENATAL VIT W/ FE FUMARATE-FA TAB 27-0.8 MG 1 TABLET: 27-0.8 TAB at 08:12

## 2023-12-21 NOTE — PLAN OF CARE
POC reviewed with pt throughout shift. Pt voiding, passing flatulus, and ambulating without difficulty. Pain managed with po pain meds. VSS. Uterus midline and firm without massage. Lochia rubra without foul odor. Pt's bed locked in lowest position and call bell within reach. Pt encouraged to call with any needs.

## 2023-12-21 NOTE — PLAN OF CARE
Lactation note:  To room to assist with waking techniques and latching baby. Baby wakes up but needs deep latch to suckle well. Latching was better in cross cradle hold and infant nursed effectively. Mom shown how to hand express and then spoon feed infant colostrum. Encourage partner to learn hand expression and mom interested in pumping to learn if baby doesn't latch well. Mom to continue to feed baby at breast at least 8 times in 24 hours with cues until content. LC phone number on board for any further needs.

## 2023-12-21 NOTE — HOSPITAL COURSE
33 year old  , s/p  of live infant female. Periclitoral and 2nd degree lacerations. Now postpartum day #1 dong well. VSS. Today's labs reviewed. Asymptomatic Anemia. Decrease in Hgb/Hct form  at admit to 9.4/27.3 following delivery. Begin po Iron. Known hx of anxiety in past. No current meds and feels she is doing well. Breastfeeding infant. Postpartum discomfort controlled with po pain meds. Desires/plan d/c home tomorrow with infant. Unsure of desired method of contraception and will discuss with her primary OB/GYN at 6 weeks postpartum visit. Consider 2 week mood check as precaution.     23 PPD2 - doing well today, normal lochia, pain controlled with PO meds. Breastfeeding/pumping. Discharge home today.

## 2023-12-21 NOTE — ANESTHESIA POSTPROCEDURE EVALUATION
Anesthesia Post Evaluation    Patient: Yadira Rodrigues    Procedure(s) Performed: * No procedures listed *    Final Anesthesia Type: epidural      Patient location during evaluation: labor & delivery  Patient participation: Yes- Able to Participate  Level of consciousness: awake and alert  Post-procedure vital signs: reviewed and stable  Pain management: adequate  Airway patency: patent  LUIS mitigation strategies: Use of major conduction anesthesia (spinal/epidural) or peripheral nerve block and Multimodal analgesia  PONV status at discharge: No PONV  Anesthetic complications: no      Cardiovascular status: blood pressure returned to baseline, hemodynamically stable and stable  Respiratory status: unassisted, spontaneous ventilation and room air  Hydration status: euvolemic  Follow-up not needed.              Vitals Value Taken Time   BP 97/57 12/20/23 2342   Temp 36.6 °C (97.8 °F) 12/20/23 2342   Pulse 68 12/20/23 2342   Resp 18 12/20/23 2342   SpO2 99 % 12/20/23 2342         No case tracking events are documented in the log.      Pain/Heidi Score: Pain Rating Prior to Med Admin: 2 (12/21/2023  4:03 AM)  Pain Rating Post Med Admin: 1 (12/20/2023  6:00 PM)

## 2023-12-21 NOTE — SUBJECTIVE & OBJECTIVE
.  33 year old  , s/p  of live infant female. Periclitoral and 2nd degree lacerations. Now postpartum day #1 dong well. VSS. Today's labs reviewed. Asymptomatic Anemia. Decrease in Hgb/Hct form  at admit to 9.4/27.3 following delivery. Begin po Iron. Known hx of anxiety in past. No current meds and feels she is doing well. Breastfeeding infant. Postpartum discomfort controlled with po pain meds. Desires/plan d/c home tomorrow with infant. Unsure of desired method of contraception and will discuss with her primary OB/GYN at 6 weeks postpartum visit. Consider 2 week mood check as precaution.    Doing well, ambulating, voiding, and tolerating regular diet  Denies dizziness or light headed sensation. Denies SOB or difficulty breathing.   Denies headaches, visual disturbances or upper GI pain, nausea, or vomiting.  Reports passing flatus. No bm since delivery. Normal bladder habits.  Lochia: steadily decreasing, normal flow  Pain: well controlled with po pain meds.   Breasts/nipples: Denies S&S of mastitis, engorgement or nipple erosion.  Depression/anxiety: Doing well. Hx situational anxiety   Support at home: yes  Contraception: Undecided; understands that progesterone only options are appropriate with breastfeeding  [place]:  is doing well, will f/u with pediatrician.     Gen: A&O x 4, NAD    Abdomen: soft, non-tender, uterus firm at U - u fb  Perineum: approximated, no edema   Lochia: minimal rubra, decreasing flow.  Ext: bilaterally no pedal edema, without signs of DVT    Objective:     Vital Signs (Most Recent):  Temp: 97.9 °F (36.6 °C) (23 1005)  Pulse: 67 (23 0838)  Resp: 18 (23 0838)  BP: 99/61 (23 0838)  SpO2: 98 % (23 0838) Vital Signs (24h Range):  Temp:  [97.8 °F (36.6 °C)-98 °F (36.7 °C)] 97.9 °F (36.6 °C)  Pulse:  [67-90] 67  Resp:  [18] 18  SpO2:  [98 %-99 %] 98 %  BP: (95-99)/(57-61) 99/61     Weight: 66.7 kg (147 lb)  Body mass index is 21.09  kg/m².      Intake/Output Summary (Last 24 hours) at 12/21/2023 1301  Last data filed at 12/20/2023 1530  Gross per 24 hour   Intake --   Output 1200 ml   Net -1200 ml         Significant Labs:  Lab Results   Component Value Date    GROUPTRH O POS 12/20/2023    HEPBSAG Non-reactive 04/27/2023    STREPBCULT No Group B Streptococcus isolated 11/27/2023     Recent Labs   Lab 12/21/23  0533   HGB 9.4*   HCT 27.3*       I have personallly reviewed all pertinent lab results from the last 24 hours.  Recent Lab Results         12/21/23  0533        Baso # 0.04       Basophil % 0.3       Differential Method Automated       Eos # 0.1       Eosinophil % 0.4       Gran # (ANC) 10.2       Gran % 79.2       Hematocrit 27.3       Hemoglobin 9.4       Immature Grans (Abs) 0.10  Comment: Mild elevation in immature granulocytes is non specific and   can be seen in a variety of conditions including stress response,   acute inflammation, trauma and pregnancy. Correlation with other   laboratory and clinical findings is essential.         Immature Granulocytes 0.8       Lymph # 2.0       Lymph % 15.2       MCH 32.1       MCHC 34.4       MCV 93       Mono # 0.5       Mono % 4.1       MPV 10.9       nRBC 0       Platelet Count 133       RBC 2.93       RDW 14.3       WBC 12.87

## 2023-12-21 NOTE — PROGRESS NOTES
[facility])  Obstetrics  Postpartum Progress Note    Patient Name: Yadira Rodrigues  MRN: [id number]  Admission Date: 2023  Hospital Length of Stay: 1 days  Attending Physician: Mary Grace Benitez MD  Primary Care Provider: Antoine Uribe MD    Subjective:     Principal Problem: (spontaneous vaginal delivery)    PP day #1  Hospital Course:    33 year old  , s/p  of live infant female. Periclitoral and 2nd degree lacerations. Now postpartum day #1 dong well. VSS. Today's labs reviewed. Asymptomatic Anemia. Decrease in Hgb/Hct form  at admit to 9.4/27.3 following delivery. Begin po Iron. Known hx of anxiety in past. No current meds and feels she is doing well. Breastfeeding infant. Postpartum discomfort controlled with po pain meds. Desires/plan d/c home tomorrow with infant. Unsure of desired method of contraception and will discuss with her primary OB/GYN at 6 weeks postpartum visit. Consider 2 week mood check as precaution.    Doing well, ambulating, voiding, and tolerating regular diet  Denies dizziness or light headed sensation. Denies SOB or difficulty breathing.   Denies headaches, visual disturbances or upper GI pain, nausea, or vomiting.  Reports passing flatus. No bm since delivery. Normal bladder habits.  Lochia: steadily decreasing, normal flow  Pain: well controlled with po pain meds.   Breasts/nipples: Denies S&S of mastitis, engorgement or nipple erosion.  Depression/anxiety: Doing well. Hx situational anxiety   Support at home: yes  Contraception: Undecided; understands that progesterone only options are appropriate with breastfeeding  [place]:  is doing well, will f/u with pediatrician.     Gen: A&O x 4, NAD    Abdomen: soft, non-tender, uterus firm at U - u fb  Perineum: approximated, no edema   Lochia: minimal rubra, decreasing flow.  Ext: bilaterally no pedal edema, without signs of DVT    Objective:     Vital Signs (Most Recent):  Temp: 97.9 °F  (36.6 °C) (23 1005)  Pulse: 67 (23 0838)  Resp: 18 (23 0838)  BP: 99/61 (23 0838)  SpO2: 98 % (23 0838) Vital Signs (24h Range):  Temp:  [97.8 °F (36.6 °C)-98 °F (36.7 °C)] 97.9 °F (36.6 °C)  Pulse:  [67-90] 67  Resp:  [18] 18  SpO2:  [98 %-99 %] 98 %  BP: (95-99)/(57-61) 99/61     Weight: 66.7 kg (147 lb)  Body mass index is 21.09 kg/m².      Intake/Output Summary (Last 24 hours) at 2023 1301  Last data filed at 2023 1530  Gross per 24 hour   Intake --   Output 1200 ml   Net -1200 ml         Significant Labs:  Lab Results   Component Value Date    GROUPTRH O POS 2023    HEPBSAG Non-reactive 2023    STREPBCULT No Group B Streptococcus isolated 2023     Recent Labs   Lab 23  0533   HGB 9.4*   HCT 27.3*       I have personallly reviewed all pertinent lab results from the last 24 hours.  Recent Lab Results         23  0533        Baso # 0.04       Basophil % 0.3       Differential Method Automated       Eos # 0.1       Eosinophil % 0.4       Gran # (ANC) 10.2       Gran % 79.2       Hematocrit 27.3       Hemoglobin 9.4       Immature Grans (Abs) 0.10  Comment: Mild elevation in immature granulocytes is non specific and   can be seen in a variety of conditions including stress response,   acute inflammation, trauma and pregnancy. Correlation with other   laboratory and clinical findings is essential.         Immature Granulocytes 0.8       Lymph # 2.0       Lymph % 15.2       MCH 32.1       MCHC 34.4       MCV 93       Mono # 0.5       Mono % 4.1       MPV 10.9       nRBC 0       Platelet Count 133       RBC 2.93       RDW 14.3       WBC 12.87             Assessment/Plan:     33 y.o. female  for:    *  (spontaneous vaginal delivery)  Routine postpartum care    Obstetrical laceration  Self care/pericare discussed    Situational anxiety  Consider 2 week mood check as precaution    History of anemia  PO Iron        Disposition: As patient  meets milestones, will plan to discharge Likely d/c home tomorrow with infant.    Anna Seymour CNM  Obstetrics  [facility])

## 2023-12-21 NOTE — LACTATION NOTE
"   12/21/23 3600   Maternal Assessment   Breast Shape Bilateral:;round   Breast Density Bilateral:;soft   Areola Bilateral:;elastic   Nipples Bilateral:;short   Left Nipple Symptoms tender   Right Nipple Symptoms tender   Maternal Infant Feeding   Maternal Emotional State assist needed   Infant Positioning cross-cradle   Signs of Milk Transfer infant jaw motion present;audible swallow   Pain with Feeding yes  ("2.5")   Pain Location nipples, bilateral   Pain Description soreness   Comfort Measures Before/During Feeding latch adjusted;infant position adjusted   Comfort Measures Following Feeding other (see comments)  (lanolin)   Nipple Shape After Feeding, Left round   Latch Assistance yes   Breast Pumping   Breast Pumping hand expression utilized   Community Referrals   Community Referrals outpatient lactation program;pediatric care provider;support group     Lactation note:  To room to assist with breastfeeding.  Infant sleepy but woke up with unwrapping an placing in bassinet. Mom states having issues with latch sometimes. Infant took a few attempts to latch but finally did well in cross cradle to the left side. Infant nursing effectively and mom shown how to hand express to give extra drops. Mom to continue to nurse baby with cues 8 or more times in 24 hours until content. Will start mom pumping if latch issues continue.  phone number on board.  "

## 2023-12-22 ENCOUNTER — PATIENT MESSAGE (OUTPATIENT)
Dept: LACTATION | Facility: CLINIC | Age: 33
End: 2023-12-22
Payer: COMMERCIAL

## 2023-12-22 VITALS
HEIGHT: 70 IN | BODY MASS INDEX: 21.05 KG/M2 | SYSTOLIC BLOOD PRESSURE: 126 MMHG | WEIGHT: 147 LBS | RESPIRATION RATE: 17 BRPM | TEMPERATURE: 98 F | OXYGEN SATURATION: 100 % | DIASTOLIC BLOOD PRESSURE: 75 MMHG | HEART RATE: 79 BPM

## 2023-12-22 PROCEDURE — 25000003 PHARM REV CODE 250: Performed by: ADVANCED PRACTICE MIDWIFE

## 2023-12-22 PROCEDURE — 25000003 PHARM REV CODE 250: Performed by: STUDENT IN AN ORGANIZED HEALTH CARE EDUCATION/TRAINING PROGRAM

## 2023-12-22 RX ORDER — DOCUSATE SODIUM 100 MG/1
200 CAPSULE, LIQUID FILLED ORAL 2 TIMES DAILY PRN
Qty: 60 CAPSULE | Refills: 0 | Status: SHIPPED | OUTPATIENT
Start: 2023-12-22 | End: 2023-12-27

## 2023-12-22 RX ORDER — IBUPROFEN 600 MG/1
600 TABLET ORAL EVERY 6 HOURS
Qty: 30 TABLET | Refills: 0 | Status: SHIPPED | OUTPATIENT
Start: 2023-12-22 | End: 2024-01-31

## 2023-12-22 RX ORDER — FERROUS SULFATE 325(65) MG
325 TABLET ORAL DAILY
Qty: 30 TABLET | Refills: 1 | Status: SHIPPED | OUTPATIENT
Start: 2023-12-22 | End: 2023-12-27

## 2023-12-22 RX ADMIN — ACETAMINOPHEN 650 MG: 325 TABLET, FILM COATED ORAL at 11:12

## 2023-12-22 RX ADMIN — IBUPROFEN 600 MG: 600 TABLET, FILM COATED ORAL at 11:12

## 2023-12-22 RX ADMIN — ACETAMINOPHEN 650 MG: 325 TABLET, FILM COATED ORAL at 05:12

## 2023-12-22 RX ADMIN — IBUPROFEN 600 MG: 600 TABLET, FILM COATED ORAL at 05:12

## 2023-12-22 RX ADMIN — DOCUSATE SODIUM 200 MG: 100 CAPSULE, LIQUID FILLED ORAL at 08:12

## 2023-12-22 RX ADMIN — FERROUS SULFATE TAB 325 MG (65 MG ELEMENTAL FE) 1 EACH: 325 (65 FE) TAB at 08:12

## 2023-12-22 NOTE — LACTATION NOTE
Mother verbalized frustrations of not being able to hand express milk herself and requested breast pump.     [employer] Symphony pump, tubing, collections containers and labels brought to bedside.  Discussed proper pump setting of initiation phase.  Instructed on proper usage of pump and to adjust suction according to maximum comfort level. Verified appropriate flange fit. Educated on the frequency and duration of pumping in order to promote and maintain a full milk supply.  Hands on pumping technique reviewed. Encouraged hand expression after pumping. Instructed on cleaning of breast pump parts. Written instructions also given. Pt verbalized understanding and appropriate recall for proper milk handling, collection, labeling, storage and transportation.    RN offered to teach partner to hand express if pt is comfortable. Pt states that she is interested and will call after electric breast pump is finished.

## 2023-12-22 NOTE — DISCHARGE SUMMARY
[facility], [place], operated by [facility] ([place])  Obstetrics  Discharge Summary      Patient Name: Yadira Rodrigues  MRN: [id number]  Admission Date: 2023  Hospital Length of Stay: 2 days  Discharge Date and Time:  2023 11:11 AM  Attending Physician: Mary Grace Benitez MD   Discharging Provider: Lillian Rodgers CNM   Primary Care Provider: Antoine Uribe MD    HPI: No notes on file        * No surgery found *     Hospital Course:   33 year old  , s/p  of live infant female. Periclitoral and 2nd degree lacerations. Now postpartum day #1 dong well. VSS. Today's labs reviewed. Asymptomatic Anemia. Decrease in Hgb/Hct form  at admit to 9.4/27.3 following delivery. Begin po Iron. Known hx of anxiety in past. No current meds and feels she is doing well. Breastfeeding infant. Postpartum discomfort controlled with po pain meds. Desires/plan d/c home tomorrow with infant. Unsure of desired method of contraception and will discuss with her primary OB/GYN at 6 weeks postpartum visit. Consider 2 week mood check as precaution.     23 PPD2 - doing well today, normal lochia, pain controlled with PO meds. Breastfeeding/pumping. Discharge home today.      Consults (From admission, onward)          Status Ordering Provider     Inpatient consult to Anesthesiology  Once        Provider:  (Not yet assigned)    Acknowledged DAVID BRAN            Final Active Diagnoses:    Diagnosis Date Noted POA    PRINCIPAL PROBLEM:   (spontaneous vaginal delivery) [O80] 2023 Not Applicable    Obstetrical laceration [O71.9] 2023 No    Situational anxiety [F41.8] 2022 Yes    History of anemia [Z86.2] 2022 Not Applicable      Problems Resolved During this Admission:    Diagnosis Date Noted Date Resolved POA    Encounter for induction of labor [Z34.90] 2023 Not Applicable        Significant Diagnostic Studies: Labs: CBC   Recent Labs   Lab 23  0533   WBC 12.87*   HGB  "9.4*   HCT 27.3*   *         Feeding Method: breast    Immunizations       Date Immunization Status Dose Route/Site Given by    23 1001 MMR Incomplete 0.5 mL Subcutaneous/     23 1001 Tdap Incomplete 0.5 mL Intramuscular/             Delivery:    Episiotomy: None   Lacerations: 2nd   Repair suture:     Repair # of packets: 2   Blood loss (ml):       Birth information:  YOB: 2023   Time of birth: 8:51 AM   Sex: female   Delivery type: Vaginal, Spontaneous   Gestational Age: 39w6d     Measurements    Weight: 3640 g  Weight (lbs): 8 lb 0.4 oz  Length: 54 cm  Length (in): 21.25"  Head circumference: 35 cm  Chest circumference: 36 cm         Delivery Clinician: Delivery Providers    Delivering clinician: Mary Grace Benitez MD   Provider Role    Selena العراقي MD Resident    Shae Goode RN Registered Nurse    Tanvi Hutchison RN Registered Nurse    Elida Wilhelm RN Registered Nurse             Additional  information:  Forceps:    Vacuum:    Breech:    Observed anomalies      Living?:     Apgars    Living status: Living  Apgar Component Scores:  1 min.:  5 min.:  10 min.:  15 min.:  20 min.:    Skin color:  1  1       Heart rate:  2  2       Reflex irritability:  2  2       Muscle tone:  2  2       Respiratory effort:  2  2       Total:  9  9       Apgars assigned by: LOIDA HUTCHISON RN         Placenta: Delivered:       appearance  Pending Diagnostic Studies:       None            Discharged Condition: stable    Disposition: Home or Self Care    Follow Up:   Follow-up Information       Mary Grace Benitez MD Follow up in 6 week(s).    Specialty: Obstetrics and Gynecology  Why: PP visit  Contact information:  [address] [facility] [address] [phone]               Mary Grace Benitez MD Follow up in 2 week(s).    Specialty: Obstetrics and Gynecology  Why: Mood check  Contact information:  [address] [facility] [address] [phone]                       "     Patient Instructions:      Diet Adult Regular     Pelvic Rest     Notify your health care provider if you experience any of the following:  temperature >100.4     Notify your health care provider if you experience any of the following:  persistent nausea and vomiting or diarrhea     Notify your health care provider if you experience any of the following:  severe uncontrolled pain     Notify your health care provider if you experience any of the following:  redness, tenderness, or signs of infection (pain, swelling, redness, odor or green/yellow discharge around incision site)     Notify your health care provider if you experience any of the following:  difficulty breathing or increased cough     Notify your health care provider if you experience any of the following:  severe persistent headache     Notify your health care provider if you experience any of the following:  worsening rash     Notify your health care provider if you experience any of the following:  persistent dizziness, light-headedness, or visual disturbances     Notify your health care provider if you experience any of the following:  increased confusion or weakness     Notify your health care provider if you experience any of the following:     Activity as tolerated     Medications:  Current Discharge Medication List        START taking these medications    Details   docusate sodium (COLACE) 100 MG capsule Take 2 capsules (200 mg total) by mouth 2 (two) times daily as needed for Constipation.  Qty: 60 capsule, Refills: 0      ferrous sulfate 325 (65 FE) MG EC tablet Take 1 tablet (325 mg total) by mouth once daily.  Qty: 30 tablet, Refills: 1      ibuprofen (ADVIL,MOTRIN) 600 MG tablet Take 1 tablet (600 mg total) by mouth every 6 (six) hours.  Qty: 30 tablet, Refills: 0           CONTINUE these medications which have NOT CHANGED    Details   doxylamine succinate (UNISOM, DOXYLAMINE,) 25 mg tablet       ondansetron (ZOFRAN-ODT) 4 MG TbDL Take 1  tablet (4 mg total) by mouth every 6 (six) hours as needed.  Qty: 60 tablet, Refills: 3    Associated Diagnoses: Nausea/vomiting in pregnancy      prenatal 25/iron fum/folic/dha (PRENATAL-1 ORAL) Take by mouth.      RSV, preF A and preF B,PF, (ABRYSVO) 120 mcg/0.5 mL SolR vaccine Inject into the muscle.  Qty: 1 each, Refills: 0             Lillian Rodgers CNM  Obstetrics  [employer] [facility] ([place])

## 2023-12-22 NOTE — ASSESSMENT & PLAN NOTE
12/22/23 PPD2 - doing well today, normal lochia, pain controlled with PO meds. Breastfeeding/pumping. Discharge home today.

## 2023-12-22 NOTE — SUBJECTIVE & OBJECTIVE
Interval History: PPD2    She is doing well this morning. She is tolerating a regular diet without nausea or vomiting. She is voiding spontaneously. She is ambulating. She has passed flatus, and has a BM. Vaginal bleeding is mild. She denies fever or chills. Abdominal pain is mild and controlled with oral medications. She Is breastfeeding. She desires circumcision for her male baby: not applicable.    Objective:     Vital Signs (Most Recent):  Temp: 97.9 °F (36.6 °C) (12/22/23 1103)  Pulse: 79 (12/22/23 0927)  Resp: 17 (12/22/23 0927)  BP: 126/75 (12/22/23 0927)  SpO2: 100 % (12/22/23 0927) Vital Signs (24h Range):  Temp:  [97 °F (36.1 °C)-98.2 °F (36.8 °C)] 97.9 °F (36.6 °C)  Pulse:  [68-79] 79  Resp:  [16-18] 17  SpO2:  [99 %-100 %] 100 %  BP: ()/(50-75) 126/75     Weight: 66.7 kg (147 lb)  Body mass index is 21.09 kg/m².    No intake or output data in the 24 hours ending 12/22/23 1107      Significant Labs:  Lab Results   Component Value Date    GROUPTRH O POS 12/20/2023    HEPBSAG Non-reactive 04/27/2023    STREPBCULT No Group B Streptococcus isolated 11/27/2023     Recent Labs   Lab 12/21/23  0533   HGB 9.4*   HCT 27.3*       I have personallly reviewed all pertinent lab results from the last 24 hours.    Physical Exam:   Constitutional: She is oriented to person, place, and time. She appears well-developed and well-nourished.    HENT:   Head: Normocephalic and atraumatic.    Eyes: Conjunctivae are normal.     Cardiovascular:  Normal rate.             Pulmonary/Chest: Effort normal.        Abdominal: Soft. There is no abdominal tenderness.     Genitourinary: There is vaginal discharge (lochia) in the vagina.           Musculoskeletal: Normal range of motion.       Neurological: She is alert and oriented to person, place, and time.    Skin: Skin is warm and dry.    Psychiatric: She has a normal mood and affect. Her behavior is normal. Judgment and thought content normal.       Review of Systems    Constitutional:  Negative for chills and fever.   Eyes: Negative.    Respiratory: Negative.     Cardiovascular: Negative.    Gastrointestinal:  Positive for abdominal pain (cramping). Negative for nausea and vomiting.   Endocrine: Negative.    Genitourinary:  Positive for vaginal bleeding (lochia). Negative for vaginal odor.   Musculoskeletal: Negative.    Integumentary:  Negative.   Neurological: Negative.    Hematological: Negative.    Psychiatric/Behavioral: Negative.     Breast: negative.

## 2023-12-22 NOTE — ASSESSMENT & PLAN NOTE
PO Iron PP   Wound Healing Center Followup Visit Note    Referring Physician : Maribel Woodard MD  [address] [facility] RECORD NUMBER:  [id number]  AGE: 78 y.o. GENDER: female  : 1944  EPISODE DATE:  5/10/2023    Subjective:     Chief Complaint   Patient presents with    Wound Check     Leg left      HISTORY of PRESENT ILLNESS JAMAAL Joel Do is a 78 y.o. female who presents today in regards to follow up evaluation and treatment of wound/ulcer. That patient's past medical, family and social hx were reviewed and changes were made if present. History of Wound Context:  The patient has had a wound of left calf which was first noted approximately 2022. This has been treated local wound care. On their initial visit to the wound healing center, 22,  the patient has noted that the wound has been improving after seeing Dr Rhiannon Curtis last week. The patient has not had similar previous wounds in the past.      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 She underwent right segmental mandibulectomy, bilateral selective neck dissection of levels 1 through 4, open tracheostomy on  with L fibula resection and implant of fibular free flap in her jaw. Per pt report wound in left leg was never closed. She receive preop chemotherapy, immunotherapy. She is no longer receiving chemotherapy. Her lymph node resection was negative. Her left calf postop wound from where she had bone/free flap taken has been non healing and they have had issues with it since immediately postop per pt and family. They said [facility] surgeon is aware, recommended local wound care and was going to see them in 2023. Her daughter had asked to follow with me going forward. She has peg in place and receives nutrition via. She follows with Dr. Paulette Tan in regards to CKD. Pt is on abx at time of initial visit. She was started on levaquin per [facility] who she had culture sent to.

## 2023-12-22 NOTE — PROGRESS NOTES
[facility]  Obstetrics  Postpartum Progress Note    Patient Name: Yadira Rodrigues  MRN: [id number]  Admission Date: 2023  Hospital Length of Stay: 2 days  Attending Physician: Mary Grace Benitez MD  Primary Care Provider: Antoine Uribe MD    Subjective:     Principal Problem: (spontaneous vaginal delivery)    Hospital Course:  33 year old  , s/p  of live infant female. Periclitoral and 2nd degree lacerations. Now postpartum day #1 dong well. VSS. Today's labs reviewed. Asymptomatic Anemia. Decrease in Hgb/Hct form  at admit to 9.4/27.3 following delivery. Begin po Iron. Known hx of anxiety in past. No current meds and feels she is doing well. Breastfeeding infant. Postpartum discomfort controlled with po pain meds. Desires/plan d/c home tomorrow with infant. Unsure of desired method of contraception and will discuss with her primary OB/GYN at 6 weeks postpartum visit. Consider 2 week mood check as precaution.     23 PPD2 - doing well today, normal lochia, pain controlled with PO meds. Breastfeeding/pumping. Discharge home today.     Interval History: PPD2    She is doing well this morning. She is tolerating a regular diet without nausea or vomiting. She is voiding spontaneously. She is ambulating. She has passed flatus, and has a BM. Vaginal bleeding is mild. She denies fever or chills. Abdominal pain is mild and controlled with oral medications. She Is breastfeeding. She desires circumcision for her male baby: not applicable.    Objective:     Vital Signs (Most Recent):  Temp: 97.9 °F (36.6 °C) (23 1103)  Pulse: 79 (23 0927)  Resp: 17 (23 09)  BP: 126/75 (23 09)  SpO2: 100 % (23 09) Vital Signs (24h Range):  Temp:  [97 °F (36.1 °C)-98.2 °F (36.8 °C)] 97.9 °F (36.6 °C)  Pulse:  [68-79] 79  Resp:  [16-18] 17  SpO2:  [99 %-100 %] 100 %  BP: ()/(50-75) 126/75     Weight: 66.7 kg (147 lb)  Body mass index is 21.09  kg/m².    No intake or output data in the 24 hours ending 23 1107      Significant Labs:  Lab Results   Component Value Date    GROUPTRH O POS 2023    HEPBSAG Non-reactive 2023    STREPBCULT No Group B Streptococcus isolated 2023     Recent Labs   Lab 23  0533   HGB 9.4*   HCT 27.3*       I have personallly reviewed all pertinent lab results from the last 24 hours.    Physical Exam:   Constitutional: She is oriented to person, place, and time. She appears well-developed and well-nourished.    HENT:   Head: Normocephalic and atraumatic.    Eyes: Conjunctivae are normal.     Cardiovascular:  Normal rate.             Pulmonary/Chest: Effort normal.        Abdominal: Soft. There is no abdominal tenderness.     Genitourinary: There is vaginal discharge (lochia) in the vagina.           Musculoskeletal: Normal range of motion.       Neurological: She is alert and oriented to person, place, and time.    Skin: Skin is warm and dry.    Psychiatric: She has a normal mood and affect. Her behavior is normal. Judgment and thought content normal.       Review of Systems   Constitutional:  Negative for chills and fever.   Eyes: Negative.    Respiratory: Negative.     Cardiovascular: Negative.    Gastrointestinal:  Positive for abdominal pain (cramping). Negative for nausea and vomiting.   Endocrine: Negative.    Genitourinary:  Positive for vaginal bleeding (lochia). Negative for vaginal odor.   Musculoskeletal: Negative.    Integumentary:  Negative.   Neurological: Negative.    Hematological: Negative.    Psychiatric/Behavioral: Negative.     Breast: negative.      Assessment/Plan:     33 y.o. female  for:    *  (spontaneous vaginal delivery)  23 PPD2 - doing well today, normal lochia, pain controlled with PO meds. Breastfeeding/pumping. Discharge home today.     Obstetrical laceration  Routine dyan care    Situational anxiety  Consider 2 week mood check as precaution    History of  anemia  PO Iron PP        Disposition: As patient meets milestones, will plan to discharge today.    Lillian Rodgers CNM  Obstetrics  [facility]

## 2023-12-22 NOTE — LACTATION NOTE
12/22/23 1045   Breasts WDL   Breast WDL WDL   Left Nipple Symptoms tender   Right Nipple Symptoms tender   Breast Pumping   Breast Pumping double electric breast pump utilized   Breast Pumping Interventions post-feed pumping encouraged   Maternal Feeding Assessment   Maternal Emotional State assist needed   Infant Positioning cross-cradle   Signs of Milk Transfer infant jaw motion present   Pain with Feeding no  (2-3 that improves with feeding)   Comfort Measures Before/During Feeding infant position adjusted;latch adjusted;maternal position adjusted   Latch Assistance yes   Pain Location nipples, bilateral   Pain Description soreness   Reproductive Interventions   Breast Care: Breastfeeding lanolin to nipples   Breastfeeding Assistance assisted with positioning;feeding cue recognition promoted;feeding on demand promoted;electric breast pump used;hand expression verified;infant latch-on verified;infant suck/swallow verified;support offered   Breastfeeding Support maternal rest encouraged;maternal nutrition promoted;maternal hydration promoted;lactation counseling provided;infant-mother separation minimized;encouragement provided;diary/feeding log utilized     Lactation note: lactation rounds. Discharge education reviewed. LC assisted pt with positioning herself and infant for optimal latch, nice wide latch , good tugs and pulls, swallows noted. Pt to continue to nurse, pump and supplement with EBM until milk volume increases and infant bilirubin has decreased.

## 2023-12-22 NOTE — PLAN OF CARE
12/22/23 1113   Final Note   Assessment Type Final Discharge Note   Anticipated Discharge Disposition Home   Post-Acute Status   Discharge Delays None known at this time

## 2023-12-26 ENCOUNTER — PATIENT MESSAGE (OUTPATIENT)
Dept: PSYCHIATRY | Facility: CLINIC | Age: 33
End: 2023-12-26
Payer: COMMERCIAL

## 2023-12-26 ENCOUNTER — PATIENT MESSAGE (OUTPATIENT)
Dept: OBSTETRICS AND GYNECOLOGY | Facility: CLINIC | Age: 33
End: 2023-12-26
Payer: COMMERCIAL

## 2023-12-26 ENCOUNTER — HOSPITAL ENCOUNTER (EMERGENCY)
Facility: OTHER | Age: 33
Discharge: HOME | End: 2023-12-26
Attending: OBSTETRICS & GYNECOLOGY
Payer: COMMERCIAL

## 2023-12-26 VITALS
OXYGEN SATURATION: 100 % | DIASTOLIC BLOOD PRESSURE: 66 MMHG | SYSTOLIC BLOOD PRESSURE: 99 MMHG | TEMPERATURE: 98 F | HEART RATE: 77 BPM | RESPIRATION RATE: 18 BRPM

## 2023-12-26 DIAGNOSIS — F41.9 ANXIETY: ICD-10-CM

## 2023-12-26 PROCEDURE — 99284 EMERGENCY DEPT VISIT MOD MDM: CPT

## 2023-12-26 PROCEDURE — 99283 EMERGENCY DEPT VISIT LOW MDM: CPT | Mod: ,,, | Performed by: OBSTETRICS & GYNECOLOGY

## 2023-12-26 RX ORDER — LORAZEPAM 0.5 MG/1
0.5 TABLET ORAL NIGHTLY PRN
Qty: 5 TABLET | Refills: 0 | Status: SHIPPED | OUTPATIENT
Start: 2023-12-26 | End: 2024-01-25

## 2023-12-26 RX ORDER — SERTRALINE HYDROCHLORIDE 25 MG/1
25 TABLET, FILM COATED ORAL DAILY
Qty: 30 TABLET | Refills: 3 | Status: SHIPPED | OUTPATIENT
Start: 2023-12-26 | End: 2024-01-31

## 2023-12-26 NOTE — ED PROVIDER NOTES
Encounter Date: 2023       History     Chief Complaint   Patient presents with    Panic Attack     HPI    Yadira Rodrigues is a 33 y.o.  PPD#6 s/p  presents for evaluation due to panic attacks. Patient reports she has been having severe anxiety surrounding sleep. She reports her mind is constantly racing with thoughts so she is unable to sleep. She feels like she will never be able to sleep again since having a baby. Patient states she was previously taking Lorazepam prior to pregnancy for situational anxiety. She denies SI and HI. She denies CP, SOB, or palpations during the episodes. Overall, her mood is stable at this time.   She is however tearful, and interested in starting an SSRI  Review of patient's allergies indicates:   Allergen Reactions    Codeine Nausea And Vomiting     Past Medical History:   Diagnosis Date    Abnormal Pap smear of cervix      No past surgical history on file.  Family History   Problem Relation Age of Onset    Hypertension Father     Hyperlipidemia Father     Prostate cancer Father         s/p resection    Hypertension Mother     Mental illness Sister     Mental illness Maternal Grandmother     Mental illness Paternal Grandmother     Cancer Maternal Uncle         lung?    Colon cancer Neg Hx     Breast cancer Neg Hx     Ovarian cancer Neg Hx      Social History     Tobacco Use    Smoking status: Never    Smokeless tobacco: Never   Substance Use Topics    Alcohol use: Not Currently     Alcohol/week: 4.0 standard drinks of alcohol     Types: 4 Standard drinks or equivalent per week     Comment: drinks socially, occasionally     Drug use: Never     Review of Systems   Constitutional:  Negative for chills and fever.   HENT:  Negative for congestion and sore throat.    Eyes:  Negative for visual disturbance.   Respiratory:  Negative for shortness of breath.    Cardiovascular:  Negative for chest pain.   Gastrointestinal:  Negative for abdominal pain, constipation, diarrhea  and nausea.   Genitourinary:  Negative for dysuria, frequency, vaginal bleeding and vaginal discharge.   Musculoskeletal:  Negative for back pain.   Skin:  Negative for rash.   Neurological:  Negative for weakness, light-headedness and headaches.   Hematological:  Does not bruise/bleed easily.   Psychiatric/Behavioral:  The patient is nervous/anxious.        Physical Exam     Initial Vitals [12/26/23 0940]   BP Pulse Resp Temp SpO2   106/64 76 18 98.4 °F (36.9 °C) 100 %      MAP       --       Temp:  [98.4 °F (36.9 °C)] 98.4 °F (36.9 °C)  Pulse:  [76-79] 77  Resp:  [18] 18  SpO2:  [100 %] 100 %  BP: ()/(64-66) 99/66    Physical Exam    Vitals reviewed.  Constitutional: She appears well-developed and well-nourished.   HENT:   Head: Normocephalic and atraumatic.   Eyes: EOM are normal.   Neck: Neck supple.   Normal range of motion.  Cardiovascular:  Normal rate.           Pulmonary/Chest: No respiratory distress.   Abdominal: There is no abdominal tenderness. There is no guarding.   Musculoskeletal:      Cervical back: Normal range of motion and neck supple.     Neurological: She is alert and oriented to person, place, and time.   Skin: Skin is warm and dry.   Psychiatric: She has a normal mood and affect. Her behavior is normal. Thought content normal.         ED Course   Procedures  Labs Reviewed - No data to display       Imaging Results    None          Medications - No data to display  Medical Decision Making  Patient afebrile, VSS.    Anxiety/Insomnia  - Patient denies suicidal or homicidal ideation   - Mood stable.   - Encouraged good sleep hygiene   - Medications: will start Ativan 0.5 mg PRN nightly and Zoloft 25mg daily  - Messaged primary OBGYN clinic for 1-2 week postpartum mood check   - Offered psychiatry referral but patient reports she has a psychiatrist that she follows with.  - Encouraged patient to f/u for mood check with her psychiatrist for therapy and to discuss new medication. They may  adjust medication as needed.   - Patient stable for discharge at this time  - ED return precautions given  - She voiced understanding and is in agreement with the plan     Risk  Prescription drug management.              Attending Attestation:   Physician Attestation Statement for Resident:  As the supervising MD   Physician Attestation Statement: I have personally seen and examined this patient.   I agree with the above history.  -:   As the supervising MD I agree with the above PE.     As the supervising MD I agree with the above treatment, course, plan, and disposition.   -: I agree with the above edited resident note. Pt seen and examined, chart and labs reviewed.    Briefly, 34 yo  PPD#6 s/p SPONTANEOUS VAGINAL DELIVERY presenting for insomnia and anxiety. Afebrile, VSS. No HI or SI. Tearful but appropriate. Pt with history of panic attacks, has psych f/u. Has previously used lorazepam for anxiety but stopped in pregnancy and does not have at home. Rx for 5 tabs sent, and pt also started on SSRI. Pt states she is going to start pumping and bottle feeding. Sleep hygiene discussed, pt encouraged to use noise- cancelling headphones and/or ear plugs for first few nights at least to promote uninterrupted sleep. Partner present and supportive, motivated to bottle feed with pumped milk.     All questions answered. Stable for d/c home with outpatient follow up.     Nely Ferraro MD  OB Hospitalist  2023     I was personally present during the critical portions of the procedure(s) performed by the resident and was immediately available in the ED to provide services and assistance as needed during the entire procedure.  I have reviewed and agree with the residents interpretation of the following: lab data.  I have reviewed the following: old records at this facility.                                       Clinical Impression:  Final diagnoses:  [Z39.2] Postpartum state (Primary)  [F41.9] Anxiety          ED  Disposition Condition    Discharge Stable          ED Prescriptions       Medication Sig Dispense Start Date End Date Auth. Provider    sertraline (ZOLOFT) 25 MG tablet Take 1 tablet (25 mg total) by mouth once daily. 30 tablet 12/26/2023 4/24/2024 Mara Moreno MD    LORazepam (ATIVAN) 0.5 MG tablet Take 1 tablet (0.5 mg total) by mouth nightly as needed (for anxiety and insomnia). 5 tablet 12/26/2023 1/25/2024 Mara Moreno MD          Follow-up Information    None          Mara Moreno MD  Resident  12/26/23 1022       Nely Ferraro MD  12/26/23 1050

## 2023-12-27 ENCOUNTER — POSTPARTUM VISIT (OUTPATIENT)
Dept: OBSTETRICS AND GYNECOLOGY | Facility: CLINIC | Age: 33
End: 2023-12-27
Payer: COMMERCIAL

## 2023-12-27 ENCOUNTER — TELEPHONE (OUTPATIENT)
Dept: LACTATION | Facility: CLINIC | Age: 33
End: 2023-12-27
Payer: COMMERCIAL

## 2023-12-27 ENCOUNTER — TELEPHONE (OUTPATIENT)
Dept: OBSTETRICS AND GYNECOLOGY | Facility: CLINIC | Age: 33
End: 2023-12-27
Payer: COMMERCIAL

## 2023-12-27 VITALS
WEIGHT: 136.69 LBS | DIASTOLIC BLOOD PRESSURE: 62 MMHG | HEIGHT: 70 IN | BODY MASS INDEX: 19.57 KG/M2 | SYSTOLIC BLOOD PRESSURE: 98 MMHG

## 2023-12-27 DIAGNOSIS — F41.8 POSTPARTUM ANXIETY: Primary | ICD-10-CM

## 2023-12-27 PROCEDURE — 0503F POSTPARTUM CARE VISIT: CPT | Mod: CPTII,S$GLB,, | Performed by: STUDENT IN AN ORGANIZED HEALTH CARE EDUCATION/TRAINING PROGRAM

## 2023-12-27 PROCEDURE — 99999 PR PBB SHADOW E&M-EST. PATIENT-LVL III: CPT | Mod: PBBFAC,,, | Performed by: STUDENT IN AN ORGANIZED HEALTH CARE EDUCATION/TRAINING PROGRAM

## 2023-12-27 RX ORDER — LORAZEPAM 0.5 MG/1
0.5 TABLET ORAL NIGHTLY PRN
Qty: 15 TABLET | Refills: 0 | Status: SHIPPED | OUTPATIENT
Start: 2023-12-27 | End: 2024-01-31

## 2023-12-27 NOTE — PROGRESS NOTES
Here for GAIL f/u. 1 week s/p delivery. Had to go in last night due to panic attacks. Has history known BAKARI previously using ativan. Yesterday was started on zoloft 25 mg and ativan 0.5 mg PRN, the latter of which helped tremendously with sleep. Was able to get 4h consecutive for the first time. EPDS 15- says sx are largely all related to anxiety over depression. She is est with psych, provider is on leave currently but will make apt.  Plan to continue zoloft at current dose, does not want to increase. Will refill ativan 0.5 mg nightly PRN while the zoloft takes effect and to bridge until she is able to be seen by her psychiatrist. F/u as scheduled for routine postpartum visit. Encouraged her to reach out sooner if needed.

## 2023-12-27 NOTE — TELEPHONE ENCOUNTER
----- Message from Mara Moreno MD sent at 12/26/2023 10:13 AM CST -----  Regarding: PP mood check  Hi,     This patient was seen in the GAIL for severe anxiety post partum. She will need a post partum mood check next week. Can you scheduled her an appointment?    Thanks,    Mara Moreno MD  OBGYN PGY-2

## 2024-01-05 RX ORDER — MUPIROCIN 20 MG/G
OINTMENT TOPICAL 2 TIMES DAILY
Qty: 30 G | Refills: 0 | Status: SHIPPED | OUTPATIENT
Start: 2024-01-05 | End: 2024-01-12

## 2024-01-05 NOTE — PROGRESS NOTES
CC: nipple pain with initial latch, improves throughout feed. +cracking of nipples  Generally improved over the last week, but still worse on left. Generally deep latch.   Nipple cracking present  Discussed mupirocin BID, continue nipple balm, deep latch, f/u lactation next week if pain persists.

## 2024-01-10 ENCOUNTER — PATIENT MESSAGE (OUTPATIENT)
Dept: OBSTETRICS AND GYNECOLOGY | Facility: CLINIC | Age: 34
End: 2024-01-10
Payer: COMMERCIAL

## 2024-01-19 ENCOUNTER — PATIENT MESSAGE (OUTPATIENT)
Dept: PSYCHIATRY | Facility: CLINIC | Age: 34
End: 2024-01-19
Payer: COMMERCIAL

## 2024-01-19 ENCOUNTER — PATIENT MESSAGE (OUTPATIENT)
Dept: OBSTETRICS AND GYNECOLOGY | Facility: CLINIC | Age: 34
End: 2024-01-19

## 2024-01-19 ENCOUNTER — OFFICE VISIT (OUTPATIENT)
Dept: OBSTETRICS AND GYNECOLOGY | Facility: CLINIC | Age: 34
End: 2024-01-19
Payer: COMMERCIAL

## 2024-01-19 DIAGNOSIS — F41.9 ANXIETY: ICD-10-CM

## 2024-01-19 DIAGNOSIS — G47.00 INSOMNIA, UNSPECIFIED TYPE: Primary | ICD-10-CM

## 2024-01-19 PROCEDURE — 99213 OFFICE O/P EST LOW 20 MIN: CPT | Mod: 95,24,,

## 2024-01-19 PROCEDURE — 1111F DSCHRG MED/CURRENT MED MERGE: CPT | Mod: CPTII,95,,

## 2024-01-19 PROCEDURE — 1160F RVW MEDS BY RX/DR IN RCRD: CPT | Mod: CPTII,95,,

## 2024-01-19 PROCEDURE — 1159F MED LIST DOCD IN RCRD: CPT | Mod: CPTII,95,,

## 2024-01-19 NOTE — PATIENT INSTRUCTIONS
Elida Jaramillo- Post partum therapy             Local support: [employer] and [employer] [place] [email or web address] [phone] (ext [phone])    Local [place] Support:  Snuggles and struggles:  Tuesdays 10:30a T  heparentingZinc [email or web address][employer]  Mom to Mom:    Wednesdays 10:30a    [email or web address]  Beyond the Blues:   Mondays 2:00p     [employer]  Cafe Au Lait:    Times vary     [email or web address]  Baby [place]:   Mondays & Fridays 9a-12p [employer]  Baby Cafe:    Wednesdays 12:00p    [employer]    Get education and online support at postpartum.net  Talk @ [phone]   Text @ [phone]

## 2024-01-20 ENCOUNTER — PATIENT MESSAGE (OUTPATIENT)
Dept: PSYCHIATRY | Facility: CLINIC | Age: 34
End: 2024-01-20
Payer: COMMERCIAL

## 2024-01-25 ENCOUNTER — POSTPARTUM VISIT (OUTPATIENT)
Dept: OBSTETRICS AND GYNECOLOGY | Facility: CLINIC | Age: 34
End: 2024-01-25
Payer: COMMERCIAL

## 2024-01-25 VITALS
BODY MASS INDEX: 19.11 KG/M2 | SYSTOLIC BLOOD PRESSURE: 104 MMHG | WEIGHT: 133.19 LBS | DIASTOLIC BLOOD PRESSURE: 70 MMHG

## 2024-01-25 DIAGNOSIS — Z30.430 ENCOUNTER FOR IUD INSERTION: ICD-10-CM

## 2024-01-25 DIAGNOSIS — G47.00 INSOMNIA, UNSPECIFIED TYPE: ICD-10-CM

## 2024-01-25 DIAGNOSIS — O92.29 POSTPARTUM NIPPLE PAIN: ICD-10-CM

## 2024-01-25 DIAGNOSIS — Z32.02 NEGATIVE PREGNANCY TEST: ICD-10-CM

## 2024-01-25 LAB
B-HCG UR QL: NEGATIVE
CTP QC/QA: YES

## 2024-01-25 PROCEDURE — 0503F POSTPARTUM CARE VISIT: CPT | Mod: CPTII,S$GLB,, | Performed by: OBSTETRICS & GYNECOLOGY

## 2024-01-25 PROCEDURE — 99999 PR PBB SHADOW E&M-EST. PATIENT-LVL III: CPT | Mod: PBBFAC,,, | Performed by: OBSTETRICS & GYNECOLOGY

## 2024-01-25 PROCEDURE — 81025 URINE PREGNANCY TEST: CPT | Mod: S$GLB,,, | Performed by: OBSTETRICS & GYNECOLOGY

## 2024-01-25 PROCEDURE — 58300 INSERT INTRAUTERINE DEVICE: CPT | Mod: S$GLB,,, | Performed by: OBSTETRICS & GYNECOLOGY

## 2024-01-25 NOTE — PROCEDURES
Insertion of IUD    Date/Time: 1/25/2024 2:45 PM    Performed by: Mary Grace Benitez MD  Authorized by: Mary Grace Benitez MD    Consent:     Consent obtained:  Prior to procedure the appropriate consent was completed and verified    Consent given by:  Patient    Procedure risks and benefits discussed: yes      Patient questions answered: yes      Patient agrees, verbalizes understanding, and wants to proceed: yes     Device to be inserted was verified by patient: yes  Insertion Procedure:   1 Intra Uterine Device levonorgestreL 21 mcg/24 hours (8 yrs) 52 mg       Pelvic exam performed: yes      Negative urine pregnancy test: yes      Cervix cleaned and prepped: yes      Speculum placed in vagina: yes      Tenaculum applied to cervix: yes      Uterus sounded: yes      Uterus sound depth (cm):  10    IUD inserted with no complications: yes      IUD type:  Mirena    Strings trimmed: yes    Post-procedure:     Patient tolerated procedure well: yes      Patient will follow up after next period: yes

## 2024-01-25 NOTE — PROGRESS NOTES
Subjective:       Patient ID: Yadira Rodrigues is a 33 y.o. female.    Chief Complaint:  Postpartum Care      History of Present Illness       Yadira Rodrigues is a 33 y.o. female  presents for a postpartum visit.    Delivery:  by: me  Pregnancy complications: persistent nausea/vomiting  Delivery complications: no  Postpartum complications: no  Breastfeeding: yes  Contraception: IUD  Postpartum Depression: yes    Her last pap was 1 year ago, normal    Patient is having trouble with insomnia.  Reports she is allowed a 7 hour stretch of sleep because she and her partner take shifts.  She does not pump during this time.  However, she cannot fall asleep and if she wakes up, she cannot stay asleep.  She does report that the zoloft is helping and it is not anxiety related.  She uses the xanax, but doesn't want to use this regularly.    Review of Systems  Review of Systems   Constitutional:  Negative for appetite change.   Eyes:  Negative for visual disturbance.   Respiratory:  Negative for cough and shortness of breath.    Cardiovascular:  Negative for chest pain.   Gastrointestinal:  Negative for abdominal pain, constipation and diarrhea.   Genitourinary:  Negative for difficulty urinating, dysuria, frequency, genital sores, pelvic pain, vaginal bleeding, vaginal discharge and vaginal pain.   Neurological:  Negative for dizziness, light-headedness and headaches.   Psychiatric/Behavioral:  Positive for sleep disturbance. Negative for dysphoric mood. The patient is nervous/anxious.            Objective:   Physical Exam  Vitals and nursing note reviewed.   Constitutional:       Appearance: She is well-developed.   Pulmonary:      Effort: Pulmonary effort is normal.   Abdominal:      Palpations: Abdomen is soft.   Genitourinary:     Labia:         Right: No rash, tenderness, lesion or injury.         Left: No rash, tenderness, lesion or injury.       Vagina: Normal. No signs of injury and foreign body. No vaginal  discharge, erythema, tenderness or bleeding.      Cervix: No cervical motion tenderness, discharge or friability.      Uterus: Not deviated, not enlarged, not fixed and not tender.       Adnexa:         Right: No mass, tenderness or fullness.          Left: No mass, tenderness or fullness.        Comments: Urethra: normal appearing urethra with no masses, tenderness or lesions  Urethral meatus: normal size, anterior vaginal wall with no prolapse, no lesions  Neurological:      Mental Status: She is alert and oriented to person, place, and time.   Psychiatric:         Behavior: Behavior normal.         Thought Content: Thought content normal.         Judgment: Judgment normal.         Chaperoned by: Dana     Assessment:        1. Postpartum care and examination    2. Postpartum nipple pain    3. Insomnia, unspecified type    4. Encounter for IUD insertion               Plan:        No orders of the defined types were placed in this encounter.    Medications Ordered This Encounter   Medications    abby wellington ointment       Breastfeeding: yes  Postpartum Depression: yes  Contraception: IUD - see procedure note  Pap smear: up to date per patient    Recommended unisom (clear with peds first) occasionally for better sleep; also recommended relaxation strategies- dim lights, read a boring book, medication, etc. Prior to bed to fall asleep.     Follow up for annual exam    Mary Grace Benitez MD      [place]  Depression Screening  Today during your child's visit you were asked questions about your mental health. You screened positive for possible depression/anxiety symptoms. This does not mean you have depression or anxiety. Postpartum depression/anxiety is very common and treatable during the postpartum period. We want to make sure that you get the treatment you need and deserve. Below are the steps that we would encourage you to take to get the help that you need.     Call your OB/GYN office:  Schedule a  follow up with your doctor. Tell them that you were screened for postpartum depression at your well baby visit. Discuss with them possible treatment options.   Individual/Group Therapy - If you are interested in therapy here are some resources that you can use to find a provider:  Postpartum Support International (PSI) - https://www.postpartum.net/ . PSI is an organization dedicated to help birthing people during pregnancy and postpartum periods. There is a wide range of information on the website and it is a great resource.  PSl Directory - https://[employer]/ - This is a directory of providers who work specifically in  (pregnancy/postpartum) mental health.   PSI Online Groups - https://www.postpartum.net/get-help/psi-online-support-meetings/ - This is a page dedicated to online FREE support groups. They have a wide variety of groups. They have groups specifically for black mothers, queer & trans, NICU, fathers, etc.   PSI Peer Mentoring Program - https://www.postpartum.net/get-help/peer-mentor-program/ - This is a program that you can send in an application to be paired with another mom to help you through your feelings of depression/anxiety.   Community Mental Health Services:  [facility] [place] - This is a community resource for mental health. They have offices in the [facility] area ([phone]) and on the [facility] ([phone]).   [facility] Services [facility] - This is a community health program for patients living in [facility]. You can call to schedule an appointment at [phone].  [facility] - This is a community health program for patients living in [facility]. You can call their Behavioral Health Community Service program to learn more at [phone].  National Mental Health Services:  Crisis Line - At any time if you feel you are a danger to yourself you can dial 988. This is a national Suicide and Crisis Lifeline. They provide free and  confidential support .  [facility] National Helpline - This is a free and confidential treatment and information service. They will be able to assist you in finding care in your area.      ______________________________________________________    Signs and Symptoms of Postpartum Mood Disorders    1 in 5 women experience a mood disorder (i.e., depression/anxiety) in the year following the birth, adoption, or loss of a child.  Signs and symptoms can present at any time within the first year. Below are some risk factors that make women more likely to experience a mood disorder.      Risk Factors  Stressful life event (i.e., job loss, financial stress, etc.)  Limit social support  History of depression or anxiety  Difficulty getting pregnant, multiple losses, or pregnancy of multiples  Adolescent parents   labor or gestational illness (i.e., diabetes, hypertension)  Pregnancy or birth complications    There are a variety of different mood disorders that women can experience. In the first two weeks about 80% of women will experience some mood fluctuations often referred to as baby blues. If symptoms persist after a few weeks, you may be experiencing a postpartum mood disorder. Below is a list of symptoms that some may experience with anxiety or depression.    Baby Blues  Often happen three to five days after birth and don't last longer than 2 weeks.  Irritability or feeling overwhelmed  Fatigue  Sadness and crying  Postpartum Depression  Feelings of anger or irritability  Lack of interest in baby  Appetite and sleep disturbance  Crying and sadness  Feelings of guilt, shame, or hopelessness  Loss of interest, simran, or pleasure in thing you use to enjoy  Thoughts of harm towards yourself or baby  Postpartum Anxiety  Constant worrying  Feeling that something bad is going to happen  Racing thoughts  Disturbances of sleep and appetite  Inability to sit still  Physical symptoms like dizziness, hot flashes, nausea,  and headaches      If you are experiencing any of these symptoms, please reach out to your doctor or child's pediatrician for more information on how to get help.

## 2024-01-30 ENCOUNTER — TELEPHONE (OUTPATIENT)
Dept: PSYCHIATRY | Facility: CLINIC | Age: 34
End: 2024-01-30
Payer: COMMERCIAL

## 2024-01-30 NOTE — TELEPHONE ENCOUNTER
"Pt is postpartum, first child, and said she is not sleeping and at night she is anxious and having intrusive thoughts about "hurting the baby on accident or purpose". She feels she is so sleep deprived that "what is to stop me from acting on these thoughts even if I don't want to?" Reports having her  and a nurse to take care of the baby at night so she can sleep but unable to fall asleep or nap. She is pumping and breastfeeding. OBGYN gave her low dose Ativan, said does not work. Said she was told by her sister in law (who is an MD in [place]) that it was okay to take one of her mothers sleeping pills (Zopiclone 7.5mg/ she took half). It helped her sleep. She pumped and dumped afterward. Message sent to Dr Elias to see if she could fit patient into her schedule. Advised pt to go to ER if feeling suicidal, homicidal, or that she needs immediate care and con not wait to be seen. Pt stated that she did not want to go to ER, she wants a sooner appointment. Scheduled to see Dr Harden on 2/12/24. Message sent to Dr Elias.         ----- Message from Shae Domingo MA sent at 1/30/2024 10:03 AM CST -----  Patient has appt coming up with Fina on 2/12 virtual states she needs medication she thinks b/c she is post pardum just had a baby and she wanted to schedule with someone else sooner? I told patient Dr Harden is coming back in office we had to keep patient with Dr Harden b/c she is her provider but the nurse could call her in regards to see how she was doing until appt ? This lady wants an earlier appt for medication but she needs to wait it out till Dr Harden comes back ? ISAAK I dont have a 60min appt for anyone to see her until then     "

## 2024-01-31 ENCOUNTER — OFFICE VISIT (OUTPATIENT)
Dept: OBSTETRICS AND GYNECOLOGY | Facility: CLINIC | Age: 34
End: 2024-01-31
Payer: COMMERCIAL

## 2024-01-31 ENCOUNTER — OFFICE VISIT (OUTPATIENT)
Dept: PSYCHIATRY | Facility: CLINIC | Age: 34
End: 2024-01-31
Payer: COMMERCIAL

## 2024-01-31 VITALS
DIASTOLIC BLOOD PRESSURE: 64 MMHG | HEIGHT: 70 IN | SYSTOLIC BLOOD PRESSURE: 98 MMHG | WEIGHT: 130.06 LBS | BODY MASS INDEX: 18.62 KG/M2

## 2024-01-31 DIAGNOSIS — F51.05 INSOMNIA DUE TO MENTAL DISORDER: ICD-10-CM

## 2024-01-31 DIAGNOSIS — F41.8 POSTPARTUM ANXIETY: Primary | ICD-10-CM

## 2024-01-31 DIAGNOSIS — Z30.431 IUD CHECK UP: Primary | ICD-10-CM

## 2024-01-31 PROBLEM — Z34.93 NORMAL PREGNANCY IN THIRD TRIMESTER: Status: RESOLVED | Noted: 2023-11-15 | Resolved: 2024-01-31

## 2024-01-31 PROCEDURE — 99214 OFFICE O/P EST MOD 30 MIN: CPT | Mod: S$GLB,,, | Performed by: OBSTETRICS & GYNECOLOGY

## 2024-01-31 PROCEDURE — 1160F RVW MEDS BY RX/DR IN RCRD: CPT | Mod: CPTII,95,, | Performed by: INTERNAL MEDICINE

## 2024-01-31 PROCEDURE — 99999 PR PBB SHADOW E&M-EST. PATIENT-LVL III: CPT | Mod: PBBFAC,,, | Performed by: OBSTETRICS & GYNECOLOGY

## 2024-01-31 PROCEDURE — 99214 OFFICE O/P EST MOD 30 MIN: CPT | Mod: 95,,, | Performed by: INTERNAL MEDICINE

## 2024-01-31 PROCEDURE — 3074F SYST BP LT 130 MM HG: CPT | Mod: CPTII,S$GLB,, | Performed by: OBSTETRICS & GYNECOLOGY

## 2024-01-31 PROCEDURE — 3008F BODY MASS INDEX DOCD: CPT | Mod: CPTII,S$GLB,, | Performed by: OBSTETRICS & GYNECOLOGY

## 2024-01-31 PROCEDURE — 1159F MED LIST DOCD IN RCRD: CPT | Mod: CPTII,95,, | Performed by: INTERNAL MEDICINE

## 2024-01-31 PROCEDURE — 3078F DIAST BP <80 MM HG: CPT | Mod: CPTII,S$GLB,, | Performed by: OBSTETRICS & GYNECOLOGY

## 2024-01-31 RX ORDER — ALPRAZOLAM 0.5 MG/1
0.5 TABLET ORAL 3 TIMES DAILY PRN
Qty: 14 TABLET | Refills: 0 | Status: SHIPPED | OUTPATIENT
Start: 2024-01-31 | End: 2024-02-12 | Stop reason: SDUPTHER

## 2024-01-31 RX ORDER — TRAZODONE HYDROCHLORIDE 50 MG/1
50-100 TABLET ORAL NIGHTLY PRN
Qty: 60 TABLET | Refills: 3 | Status: SHIPPED | OUTPATIENT
Start: 2024-01-31 | End: 2024-03-11

## 2024-01-31 RX ORDER — SERTRALINE HYDROCHLORIDE 50 MG/1
50 TABLET, FILM COATED ORAL DAILY
Qty: 30 TABLET | Refills: 3 | Status: SHIPPED | OUTPATIENT
Start: 2024-01-31 | End: 2024-02-12 | Stop reason: SDUPTHER

## 2024-01-31 NOTE — PROGRESS NOTES
OUTPATIENT PSYCHIATRY RETURN VISIT    ENCOUNTER DATE:  1/31/24   SITE:  [facility], [facility]  LENGTH OF SESSION:  25 minutes    The patient location is:  [place], not in a healthcare facility  Visit type:  audiovisual    Face to Face time with patient:  25 minutes  35 minutes of total time spent on the encounter, which includes face to face time and non-face to face time preparing to see the patient (eg, review of tests), Obtaining and/or reviewing separately obtained history, Documenting clinical information in the electronic or other health record, Independently interpreting results (not separately reported) and communicating results to the patient/family/caregiver, or Care coordination (not separately reported).     Each patient to whom he or she provides medical services by telemedicine is:  (1) informed of the relationship between the physician and patient and the respective role of any other health care provider with respect to management of the patient; and (2) notified that he or she may decline to receive medical services by telemedicine and may withdraw from such care at any time.    CHIEF COMPLAINT:  Anxiety and Insomnia      HISTORY OF PRESENTING ILLNESS:  Yadira Rodrigues is a 33 y.o. female with history of Anxiety who presents for follow up appointment.  I am seeing patient today for urgent follow up appointment while Dr. Harden is out on maternity leave.      Plan at last appointment on 10/10/24 (Dr. Harden):  BAKARI, claustrophobia  Ms Rodrigues is a 32 y/o F who presents for f/u for BAKARI, claustrophobia. She is 29 weeks pregnant and struggling in her pregnancy. Pt is struggling with anxiety and claustrophobia and wanted to discuss her options leading up to her delivery and postpartum. Discussed this with pt's OB, who would prefer pt take an SSRI rather than PRN benzodiazepine pt has used in the past, both for fetal safety and for prevention of PPD. Discussed PPD and anticipatory  "guidance with pt who believes she is high risk (I agree). She denies depression currently though she has had mood swings. No decisions made at this time to initiate medication. Discussed upcoming maternity leave, ER precautions. No SI, HI, AVH.   RTC 6 weeks or sooner PRN    History as told by patient:  Baby is 6 weeks old.  Having a lot of insomnia.  Takes 1-4 hours to fall asleep.  That is really tough - splits night with .  Will get 7 hour stretch but if it takes 4 hours to fall asleep then only gets a few hours.  Takes 1/2 Unisom when she needs to get up with baby.  Schedule when she takes baby is: Goes to bed at 8pm, takes 1/2 Unisom then, and goes to get her at 3am.  Sometimes it helps, sometimes it doesn't.  Has tried sleep meditation, tried 2 nights and it did help some.  Other nights has a night nurse - goes to bed at 8pm and wakes up at 5:30am.  Took 1/2 Zopiclone and had the best sleep (this was from sister in [place], not made here in [place]).  Would never take it on nights she has to go get her.  Tried Benadryl.  Breastfeeding during the day - doesn't pump or breastfeed during the night so does have full 7 hours (at least) where she could be sleeping.  Says she is neurotic so can get into her head a lot.  Usually an ok sleeper.  But the anxiety/pressure to sleep - feeling like she has to go to sleep right then.  Had anxiety in the past but never on SSRI.  Had propranolol PRN for performance anxiety and Ativan for flying.  Has always had intrusive thoughts of "what if I accidentally went into the other roberto."  Having "what if" thoughts now about baby safety.  What if I dropped the baby down the stairs, then freaks out about the thought, removes herself from the situation so won't even go near the stairs.  Never has thoughts of harming baby, its fear of what if that happened.  Working on meditating so she can control how she reacts to intrusive thoughts.  Interested in therapy to address.  Denies " SI, HI, or psychosis.    Medication side effects:  No  Medication compliance:  Yes    PSYCHIATRIC REVIEW OF SYSTEMS:  Trouble with sleep:  Yes as above  Appetite changes:  Not discussed  Weight changes:  Not discussed  Lack of energy:  Denies  Anhedonia:  Denies  Somatic symptoms:  Denies  Libido:  Not discussed  Anxiety/panic:  Yes as above   Guilty/hopeless:  Denies  Self-injurious behavior/risky behavior:  Denies  Any drugs:  Not discussed  Alcohol:  Not discussed  Breastfeeding:  Only during the day (has 7-9 hour stretch when she does not pump or breastfeed at night)    MEDICAL REVIEW OF SYSTEMS:  Complete review of systems performed covering Constitutional, Musculoskeletal, Neurologic.  All systems negative except for that covered in HPI.    PAST PSYCHIATRIC, MEDICAL, AND SOCIAL HISTORY REVIEWED  The patient's past medical, family and social history have been reviewed and updated as appropriate within the electronic medical record - see encounter notes.    MEDICATIONS:    Current Outpatient Medications:     ALPRAZolam (XANAX) 0.5 MG tablet, Take 1 tablet (0.5 mg total) by mouth 3 (three) times daily as needed for Insomnia or Anxiety., Disp: 14 tablet, Rfl: 0    jack wellington ointment, Apply topically 3 (three) times daily. Apply after feeding. Do not wash off. This compounded medication expires in 30 days. (Patient not taking: Reported on 1/31/2024), Disp: 30 g, Rfl: 0    prenatal 25/iron fum/folic/dha (PRENATAL-1 ORAL), Take by mouth., Disp: , Rfl:     sertraline (ZOLOFT) 50 MG tablet, Take 1 tablet (50 mg total) by mouth once daily., Disp: 30 tablet, Rfl: 3    traZODone (DESYREL) 50 MG tablet, Take 1-2 tablets ( mg total) by mouth nightly as needed for Insomnia., Disp: 60 tablet, Rfl: 3    Current Facility-Administered Medications:     levonorgestreL (MIRENA) 21 mcg/24 hours (8 yrs) 52 mg IUD 1 Intra Uterine Device, 1 Intra Uterine Device, Intrauterine, , Mary Grace Benitez MD, 1 Intra Uterine Device  "at 01/25/24 1445    ALLERGIES:  Review of patient's allergies indicates:   Allergen Reactions    Codeine Nausea And Vomiting       PSYCHIATRIC EXAM:  There were no vitals filed for this visit.  Appearance:  Well groomed, appearing healthy and of stated age  Behavior:  Cooperative, pleasant, no psychomotor agitation or retardation  Speech:  Normal rate, rhythm, prosody, and volume  Mood:  "Anxious, trouble sleeping"  Affect:  Euthymic  Thought Process:  Linear, logical, goal directed  Thought Content:  Negative for suicidal ideation, homicidal ideation, delusions or hallucinations.  Associations:  Intact  Memory:  Grossly Intact  Level of Consciousness/Orientation:  Grossly intact  Fund of Knowledge:  Good  Attention:  Good  Language:  Fluent, able to name abstract and concrete objects  Insight:  Good  Judgment:  Intact  Psychomotor signs:  No involuntary movements of face  Gait:  Unable to assess via virtual visit      RELEVANT LABS/STUDIES:    Lab Results   Component Value Date    WBC 12.87 (H) 12/21/2023    HGB 9.4 (L) 12/21/2023    HCT 27.3 (L) 12/21/2023    MCV 93 12/21/2023     (L) 12/21/2023     BMP  Lab Results   Component Value Date     (L) 07/30/2023    K 3.8 07/30/2023     07/30/2023    CO2 19 (L) 07/30/2023    BUN 5 (L) 07/30/2023    CREATININE 0.6 07/30/2023    CALCIUM 8.9 07/30/2023    ANIONGAP 8 07/30/2023    ESTGFRAFRICA >60 02/11/2022    EGFRNONAA >60 02/11/2022     Lab Results   Component Value Date    ALT 15 07/30/2023    AST 18 07/30/2023    ALKPHOS 46 (L) 07/30/2023    BILITOT 0.8 07/30/2023     Lab Results   Component Value Date    TSH 1.337 08/01/2022     No results found for: "LABA1C", "HGBA1C"    IMPRESSION:    Yadira Rodrigues is a 33 y.o. female with history of Anxiety who presents for follow up appointment.    Status/Progress:  Based on the examination today, the patient's problem(s) is/are inadequately controlled.  New problems have been presented today.     Risk " Parameters:  Patient reports no suicidal ideation  Patient reports no homicidal ideation  Patient reports no self-injurious behavior  Patient reports no violent behavior    DIAGNOSES:    ICD-10-CM ICD-9-CM   1. Postpartum anxiety  O99.345 648.44    F41.8 300.00   2. Insomnia due to mental disorder  F51.05 300.9     327.02       PLAN:  Increase Zoloft to 50mg daily to better target anxiety with intrusive thoughts.  Low threshold to increase to 100mg daily at next appointment depending on severity of her anxiety.  Start Trazodone 25-50mg qHS PRN insomnia.  She plans to start with 25mg on a night she is not caring for the baby and will increase as she tolerates.  She will not be breastfeeding until 7-9 hours later.      If she can not fall asleep after 1 hour with Trazodone, she can take Xanax 0.25-0.5mg PRN.  Message sent to [facility] for prescription since I have not met patient in person.  The soonest the patient would be breastfeeding again after taking Xanax would be 6 hours later.  Discussed with patient informed consent, risks versus benefits, alternative treatments, side effect profile and the inherent unpredictability of individual responses to these treatments.  The patient expresses understanding of the above and displays the capacity to agree with this current plan.  Referral to [facility]  therapist.  She will be seeing Dr. Harden for follow up in less than 2 weeks.    RETURN TO CLINIC:  Follow up in 12 days (on 2024). With Dr. Harden.

## 2024-01-31 NOTE — Clinical Note
Kristopher Jewell, I just wanted you to see this once you are back.  I think you see her for follow up your 1st day back.  Severe anxiety with insomnia.

## 2024-01-31 NOTE — PROGRESS NOTES
History & Physical  Gynecology      SUBJECTIVE:     Chief Complaint: IUD Check       History of Present Illness:  Ms. Rodrigues is a 33 y.o. female who returns 1 week after an IUD placement.  She has no complaints today with the IUD other than some bleeding.      Has psych appointment today.  Reports that she had a panic moment and so was able to move the appointment up.  Reports that she is sleeping better with meditation and a prescription like ambien from her sister who is a doctor (but different than ambien- it is L2).      Review of Systems:  Review of Systems   Constitutional:  Negative for appetite change, fever and unexpected weight change.   Respiratory:  Negative for shortness of breath.    Cardiovascular:  Negative for chest pain.   Gastrointestinal:  Negative for nausea and vomiting.   Genitourinary:  Negative for menorrhagia, menstrual problem, pelvic pain, vaginal bleeding, vaginal discharge and vaginal pain.   Psychiatric/Behavioral:  Positive for depression and sleep disturbance. The patient is nervous/anxious.         OBJECTIVE:     Physical Exam:  Physical Exam  Vitals and nursing note reviewed.   Constitutional:       Appearance: She is well-developed.   Pulmonary:      Effort: Pulmonary effort is normal.   Genitourinary:     Labia:         Right: No rash, tenderness, lesion or injury.         Left: No rash, tenderness, lesion or injury.       Vagina: Normal. No signs of injury and foreign body. No vaginal discharge, erythema, tenderness or bleeding.      Cervix: No cervical motion tenderness, discharge or friability.      Comments: Strings visualized  Neurological:      Mental Status: She is alert and oriented to person, place, and time.   Psychiatric:         Behavior: Behavior normal.         Thought Content: Thought content normal.         Judgment: Judgment normal.         ASSESSMENT:       ICD-10-CM ICD-9-CM    1. IUD check up  Z30.431 V25.42       2. Post partum depression  F53.0 648.44      Sx are improving at this time, rest, ice, f/u if sx persist over the next few weeks w/o improvement.    311              Plan:      Yadira was seen today for iud check.    Diagnoses and all orders for this visit:    IUD check up    Post partum depression    30 minutes of total time was spent on the encounter which included face-to-face time and non-face-to-face time preparing to see the patient, obtaining and or reviewing   separately obtained history, documenting clinical information in the electronic or other health record, independently interpreting results (not separately reported) and   communicating results to the patient, or care coordination (not separately reported).    - IUD appropriate  - has follow up with psych today;     No orders of the defined types were placed in this encounter.      IUD strings visualized on exam.  Strings did not require trimming.    Follow up for annual exam.     Mary Grace Charles requested to be prescribed by psychiatrist.  Sent to pharmacy.

## 2024-02-12 ENCOUNTER — PATIENT MESSAGE (OUTPATIENT)
Dept: PSYCHIATRY | Facility: CLINIC | Age: 34
End: 2024-02-12
Payer: COMMERCIAL

## 2024-02-12 ENCOUNTER — OFFICE VISIT (OUTPATIENT)
Dept: PSYCHIATRY | Facility: CLINIC | Age: 34
End: 2024-02-12
Payer: COMMERCIAL

## 2024-02-12 DIAGNOSIS — F41.1 GENERALIZED ANXIETY DISORDER: ICD-10-CM

## 2024-02-12 DIAGNOSIS — F41.8 POSTPARTUM ANXIETY: Primary | ICD-10-CM

## 2024-02-12 DIAGNOSIS — F51.05 INSOMNIA DUE TO MENTAL DISORDER: ICD-10-CM

## 2024-02-12 PROCEDURE — 99214 OFFICE O/P EST MOD 30 MIN: CPT | Mod: 95,,, | Performed by: STUDENT IN AN ORGANIZED HEALTH CARE EDUCATION/TRAINING PROGRAM

## 2024-02-12 PROCEDURE — 90833 PSYTX W PT W E/M 30 MIN: CPT | Mod: 95,,, | Performed by: STUDENT IN AN ORGANIZED HEALTH CARE EDUCATION/TRAINING PROGRAM

## 2024-02-12 RX ORDER — SERTRALINE HYDROCHLORIDE 50 MG/1
50 TABLET, FILM COATED ORAL DAILY
Qty: 30 TABLET | Refills: 3 | Status: SHIPPED | OUTPATIENT
Start: 2024-02-12 | End: 2024-05-07 | Stop reason: SDUPTHER

## 2024-02-12 RX ORDER — ALPRAZOLAM 0.5 MG/1
0.5 TABLET ORAL NIGHTLY PRN
Qty: 30 TABLET | Refills: 0 | Status: SHIPPED | OUTPATIENT
Start: 2024-02-12 | End: 2024-05-07 | Stop reason: SDUPTHER

## 2024-02-12 NOTE — PROGRESS NOTES
OUTPATIENT PSYCHIATRY RETURN VISIT    ENCOUNTER DATE:  2/12/24   SITE:  [facility]  LENGTH OF SESSION:  25 minutes    The patient location is:  [place], not in a healthcare facility  Visit type:  audiovisual    Face to Face time with patient:  32 minutes  35 minutes of total time spent on the encounter, which includes face to face time and non-face to face time preparing to see the patient (eg, review of tests), Obtaining and/or reviewing separately obtained history, Documenting clinical information in the electronic or other health record, Independently interpreting results (not separately reported) and communicating results to the patient/family/caregiver, or Care coordination (not separately reported).     Each patient to whom he or she provides medical services by telemedicine is:  (1) informed of the relationship between the physician and patient and the respective role of any other health care provider with respect to management of the patient; and (2) notified that he or she may decline to receive medical services by telemedicine and may withdraw from such care at any time.    CHIEF COMPLAINT:  No chief complaint on file.      HISTORY OF PRESENTING ILLNESS:  Yadira Rodrigues is a 33 y.o. female with history of Anxiety who presents for follow up appointment.    Baby is generally doing well. Birth went well- had no issues. She increased the zoloft to 50 mg at last visit. She had been having intrusive thoughts prior to this increase. Her intrusive thoughts have improved- she has been able to redirect herself. She is not having them as frequently. She denies any side effects or problems with the zoloft.   She is exclusively breastfeeding. Baby's weight gain is good and she is not having any issues with the zoloft.   She is still having insomnia- she is having problems falling asleep and is also waking up early in the night, with terminal insomnia. They have hired a night nurse to  help with this and her  also splits the night with her. She has a 6-9 hour stretch where she does not need to be taking care of the baby.   She took 1/2 of a zopiclone last night. When she wakes up, she gets up to pee and then gets back in bed- is not able to fall alseep again. She has been taking 1/2 a unisom and then an hour later 1/2 a xanax and it keeps her asleep. She doesn't want to rely on medication regularly for sleep.   She has been exercising for about 30 minutes a day and trying to go for a walk every day also.   She is not taking any caffeine. She exercises and eats well. She has never been a great sleeper but this problem is relatively new.   She doesn't feel like she is worrying before she falls asleep at night anymore but she had been prior to the zoloft.   She tried the trazodone and didn't feel like it was very helpful. Only tried 1 50 mg of the trazodone.   Has referral for  therapist.     She is looking forward to going back to work.   They love their kid,  is doing well.   NO alcohol. She does not want to hurt herself or anyone else.   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   Baby is 8 weeks old.     Talk about [facility] and will put in referral.     Plan at last visit: Baby is 6 weeks old.  Having a lot of insomnia.  Takes 1-4 hours to fall asleep.  That is really tough - splits night with .  Will get 7 hour stretch but if it takes 4 hours to fall asleep then only gets a few hours.  Takes 1/2 Unisom when she needs to get up with baby.  Schedule when she takes baby is: Goes to bed at 8pm, takes 1/2 Unisom then, and goes to get her at 3am.  Sometimes it helps, sometimes it doesn't.  Has tried sleep meditation, tried 2 nights and it did help some.  Other nights has a night nurse - goes to bed at 8pm and wakes up at 5:30am.  Took 1/2 Zopiclone and had the best sleep (this was from sister in [place], not made here in [place]).  Would never take it on nights she has  "to go get her.  Tried Benadryl.  Breastfeeding during the day - doesn't pump or breastfeed during the night so does have full 7 hours (at least) where she could be sleeping.  Says she is neurotic so can get into her head a lot.  Usually an ok sleeper.  But the anxiety/pressure to sleep - feeling like she has to go to sleep right then.  Had anxiety in the past but never on SSRI.  Had propranolol PRN for performance anxiety and Ativan for flying.  Has always had intrusive thoughts of "what if I accidentally went into the other roberto."  Having "what if" thoughts now about baby safety.  What if I dropped the baby down the stairs, then freaks out about the thought, removes herself from the situation so won't even go near the stairs.  Never has thoughts of harming baby, its fear of what if that happened.  Working on meditating so she can control how she reacts to intrusive thoughts.  Interested in therapy to address.  Denies SI, HI, or psychosis.    Medication side effects:  No  Medication compliance:  Yes    PSYCHIATRIC REVIEW OF SYSTEMS:  Trouble with sleep:  Yes as above  Appetite changes:  Not discussed  Weight changes:  Not discussed  Lack of energy:  Denies  Anhedonia:  Denies  Somatic symptoms:  Denies  Libido:  Not discussed  Anxiety/panic:  Yes as above   Guilty/hopeless:  Denies  Self-injurious behavior/risky behavior:  Denies  Any drugs:  Not discussed  Alcohol:  Not discussed  Breastfeeding:  Only during the day (has 7-9 hour stretch when she does not pump or breastfeed at night)    MEDICAL REVIEW OF SYSTEMS:  Complete review of systems performed covering Constitutional, Musculoskeletal, Neurologic.  All systems negative except for that covered in HPI.    PAST PSYCHIATRIC, MEDICAL, AND SOCIAL HISTORY REVIEWED  The patient's past medical, family and social history have been reviewed and updated as appropriate within the electronic medical record - see encounter notes.    MEDICATIONS:    Current Outpatient " "Medications:     ALPRAZolam (XANAX) 0.5 MG tablet, Take 1 tablet (0.5 mg total) by mouth 3 (three) times daily as needed for Insomnia or Anxiety., Disp: 14 tablet, Rfl: 0    jack wellington ointment, Apply topically 3 (three) times daily. Apply after feeding. Do not wash off. This compounded medication expires in 30 days. (Patient not taking: Reported on 1/31/2024), Disp: 30 g, Rfl: 0    prenatal 25/iron fum/folic/dha (PRENATAL-1 ORAL), Take by mouth., Disp: , Rfl:     sertraline (ZOLOFT) 50 MG tablet, Take 1 tablet (50 mg total) by mouth once daily., Disp: 30 tablet, Rfl: 3    traZODone (DESYREL) 50 MG tablet, Take 1-2 tablets ( mg total) by mouth nightly as needed for Insomnia., Disp: 60 tablet, Rfl: 3    Current Facility-Administered Medications:     levonorgestreL (MIRENA) 21 mcg/24 hours (8 yrs) 52 mg IUD 1 Intra Uterine Device, 1 Intra Uterine Device, Intrauterine, , Mary Grace Benitez MD, 1 Intra Uterine Device at 01/25/24 4135    ALLERGIES:  Review of patient's allergies indicates:   Allergen Reactions    Codeine Nausea And Vomiting       PSYCHIATRIC EXAM:  There were no vitals filed for this visit.  Appearance:  Well groomed, appearing healthy and of stated age  Behavior:  Cooperative, pleasant, no psychomotor agitation or retardation  Speech:  Normal rate, rhythm, prosody, and volume  Mood:  "better but the insomnia is terrible"  Affect:  Euthymic  Thought Process:  Linear, logical, goal directed  Thought Content:  Negative for suicidal ideation, homicidal ideation, delusions or hallucinations.  Associations:  Intact  Memory:  Grossly Intact  Level of Consciousness/Orientation:  Grossly intact  Fund of Knowledge:  Good  Attention:  Good  Language:  Fluent, able to name abstract and concrete objects  Insight:  Good  Judgment:  Intact  Psychomotor signs:  No involuntary movements of face  Gait:  Unable to assess via virtual visit      RELEVANT LABS/STUDIES:    Lab Results   Component Value Date    WBC " "12.87 (H) 12/21/2023    HGB 9.4 (L) 12/21/2023    HCT 27.3 (L) 12/21/2023    MCV 93 12/21/2023     (L) 12/21/2023     BMP  Lab Results   Component Value Date     (L) 07/30/2023    K 3.8 07/30/2023     07/30/2023    CO2 19 (L) 07/30/2023    BUN 5 (L) 07/30/2023    CREATININE 0.6 07/30/2023    CALCIUM 8.9 07/30/2023    ANIONGAP 8 07/30/2023    ESTGFRAFRICA >60 02/11/2022    EGFRNONAA >60 02/11/2022     Lab Results   Component Value Date    ALT 15 07/30/2023    AST 18 07/30/2023    ALKPHOS 46 (L) 07/30/2023    BILITOT 0.8 07/30/2023     Lab Results   Component Value Date    TSH 1.337 08/01/2022     No results found for: "LABA1C", "HGBA1C"    IMPRESSION:    Yadira Rodrigues is a 33 y.o. female with history of Anxiety who presents for follow up appointment.    Status/Progress:  Based on the examination today, the patient's problem(s) is/are inadequately controlled.  New problems have been presented today.     Risk Parameters:  Patient reports no suicidal ideation  Patient reports no homicidal ideation  Patient reports no self-injurious behavior  Patient reports no violent behavior    DIAGNOSES:  Postpartum anxiety  Insomnia d/t mental disorder  Hx of performance anxiety    PLAN:  Continue Zoloft 50mg daily to better target anxiety with intrusive thoughts- pt has seen benefit at this dose.  Low threshold to increase to 100mg daily at next appointment depending on severity of her anxiety.  Pt did not respond to trazodone.   Pt can alternate unisom and  Xanax 0.25-0.5mg PRN.The soonest the patient would be breastfeeding again after taking Xanax would be 6 hours later. Discussed r/b/SE of this medication and discussed monitoring infant for sedation. Infant has not had any issues.   Discussed with patient informed consent, risks versus benefits, alternative treatments, side effect profile and the inherent unpredictability of individual responses to these treatments.  The patient expresses understanding of " the above and displays the capacity to agree with this current plan.  Referral to [facility]  therapist.  F/u 1 month  Referred for [facility]  Reviewed emergency precautions and numbers  No SI, HI, AVH. No thoughts of hurting infant.     PSYCHOTHERAPY ADD-ON +[id number] 30 minutes (range 16-37 minutes)  Therapeutic intervention type: behavior modifying psychotherapy  Why chosen therapy is appropriate versus another modality: relevant to diagnosis  Target symptoms addressed: anxiety , insomnia  Topics and themes discussed:  insomnia, parenting issues  Primary focus: postpartum anxiety, insomnia, sleep techniques  Psychotherapeutic techniques employed: active listening, psychoeducation, and problem solving and stress reduction techniques  Outcome monitoring methods: self-report  The patient's response to the intervention is: accepting.   The patient's progress toward treatment goals is: fair.  Duration of intervention: 17 minutes

## 2024-02-19 ENCOUNTER — OFFICE VISIT (OUTPATIENT)
Dept: PSYCHIATRY | Facility: CLINIC | Age: 34
End: 2024-02-19
Payer: COMMERCIAL

## 2024-02-19 DIAGNOSIS — F51.05 INSOMNIA DUE TO MENTAL DISORDER: Primary | ICD-10-CM

## 2024-02-19 DIAGNOSIS — F41.1 GENERALIZED ANXIETY DISORDER: ICD-10-CM

## 2024-02-19 PROCEDURE — 90791 PSYCH DIAGNOSTIC EVALUATION: CPT | Mod: 95,,, | Performed by: PSYCHOLOGIST

## 2024-02-19 NOTE — PROGRESS NOTES
"[facility] Psychiatry Initial Visit (PhD/LCSW)      Patient Name:  Yadira Rodrigues    Date: 02/19/2024    Site:  The patient location is: [place] ([place])  The chief complaint leading to consultation is: insomnia    Visit type: audiovisual    Face to Face time with patient: 36  45 minutes of total time spent on the encounter, which includes face to face time and non-face to face time preparing to see the patient (eg, review of tests), Obtaining and/or reviewing separately obtained history, Documenting clinical information in the electronic or other health record, Independently interpreting results (not separately reported) and communicating results to the patient/family/caregiver, or Care coordination (not separately reported).     Each patient to whom he or she provides medical services by telemedicine is:  (1) informed of the relationship between the physician and patient and the respective role of any other health care provider with respect to management of the patient; and (2) notified that he or she may decline to receive medical services by telemedicine and may withdraw from such care at any time.    Notes:     Referral source:  Fanta Harden MD     Chief complaint/reason for encounter:  Psychological Evaluation to assess suitability for admission to the [facility]   Clinical status of patient:  Outpatient   Met with:  Patient   CPT Code: 54100      Before this evaluation was initiated, the purposes and process of the assessment and the limits of confidentiality were discussed with the patient who expressed understanding of these issues and verbally consented to proceed with the evaluation.      History of present illness:  Ms. Yadira Rodrigues is a 33 y.o. -year-old female who is pursuing psychotherapy to improve insomnia.  Patient states, "My insomnia started with childbirth. I was pumping every 3 hours in the night. So I wasn't getting much sleep because of that and I am finding that no matter how " "exhausted I am I can't fall back asleep. I'm not pumping in the night anymore but I'm still struggling. I have a 7-9 hour window where I can sleep, but I end up only sleeping 3-4 hours. It takes me at least an hour to fall asleep every night." Pumps at 8pm, gets into bed at 8:30, sometimes tries a sleep meditation but doesn't do much. "I take half a Unisom at 8:30 and if I'm not asleep within the hour I take half a xanax. I usually get up to pee at 9:30 and then hopefully by 10 I am out. I usually get a few hours of sleep then I have to wake up to pee again. After that, I can get back to sleep sometimes but others I'm up for 2-3 hours. I wake up for the day at 4 of 5 am, get the baby and feed her and am up for the day." Baby is  2 months old tomorrow.    Tries to nap but doesn't ever fall asleep        Medical history:    Patient Active Problem List   Diagnosis    Consumes a vegan diet    History of anemia    Situational anxiety    Amenorrhea     (spontaneous vaginal delivery)    Obstetrical laceration          Psychiatric symptoms:   Depression - Denied depressed mood, loss of interest in pleasurable activities, anhedonia, sleep changes, decreased motivation, decreased concentration, feelings of excessive or irrational guilt, helplessness, hopelessness, increased or decreased appetite, weight changes, increased or decreased motor activity, decreased energy, suicidal ideations/thoughts of death.  Jonelle/Hypomania - Denied increased goal directed activity, decreased need for sleep, pressured speech or increased talkative, racing thoughts, increased risk-taking behavior, episodic elevated or irritable mood, flight of ideas, distractibility, inflated self-esteem, grandiosity  Anxiety - Endorsed excessive worry, difficulty controlling worrying,  sleep disturbance, racing thoughts, intrusive thoughts  Panic Attacks- Denied palpitations, sweating, trembling, dyspnea, choking sensation, chest pain/discomfort, nausea, " dizziness, chills or hot flashes, tingling, derealization, fear of losing control, fear of dying.  Thoughts - Denied any AVH, paranoia, delusions, ideas of reference, thought insertion or thought broadcasting  Suicidal thoughts/behaviors - denied passive or active SI, denied suicidal plans or intent  Self-injury - denied.  Substance abuse - denied abuse or dependence.   Sleep - Endorsed, see above     Current psychosocial stressors:  being a new mom, work pressures  Report of coping skills:  exercise, talk to , deep breathing  Support system:  , sister (lives in [place])    Current and past substance use:   Alcohol:  Denied current use.  Denied history of abuse or dependency.   Drugs:  Denied current use.  Denied history of abuse or dependency.   Tobacco:  Denied current use.   Caffeine:  Denied current use.      Current Psychiatric Treatment:   Medications:  Zoloft  Psychotherapy:  none      Psychiatric history:   Previous diagnosis:  postpartum anxiety  Previous hospitalizations:  denied  History of outpatient treatment:  individual therapy n and off over last 5 years, medication management  Previous suicide attempt:  Denied.   Family history of psychiatric illness:  denied  Access to guns:  denied     Trauma history:  Denied.      Social history:  Ms. Yadira Rodrigues was born and raised in [place] by her biological parents.  She described her childhood as average.  She denied childhood trauma, abuse, and neglect.  She a PhD in economics.  She is currently a professor at [employer].  She denied  service.  She is not on disability.  She has been  to her  for 4 years.  She has one daughter, 2 months. She currently lives with her  and daughter.      Mental Status Exam:   General appearance:  Appears stated age, neatly dressed, well groomed   Speech:  Normal rate, normal tone, normal pitch, normal volume   Level of cooperation:  Cooperative   Thought processes:  Logical,  goal-directed   Mood:  Euthymic   Thought content:  No illusions, no visual hallucinations, no auditory hallucinations, no delusions, no active or passive homicidal thoughts, no active or passive suicidal ideation, no obsessions, no compulsions, no violence   Affect:  Appropriate   Orientation:  Oriented to person, place, and date   Memory:   Recent memory:  Intact   Remote memory: Intact   Attention span and concentration:  Appropriate   Fund of general knowledge: Appropriate  Abstract reasoning:   Not Assessed  Judgment and insight: Fair   Language:  Intact      Diagnostic impression:     ICD-10-CM ICD-9-CM   1. Insomnia due to mental disorder  F51.05 300.9     327.02   2. Generalized anxiety disorder  F41.1 300.02             Plan:  Ms. Yadira Rodrigues will be admitted to the [facility].  She understood [facility] guidelines and signed the [facility] Informed Consent and [facility] Partnership Agreement.  She was provided with information about [facility] treatments and will proceed with CBT for Insomnia.

## 2024-02-21 ENCOUNTER — PATIENT MESSAGE (OUTPATIENT)
Dept: OBSTETRICS AND GYNECOLOGY | Facility: CLINIC | Age: 34
End: 2024-02-21
Payer: COMMERCIAL

## 2024-02-21 ENCOUNTER — TELEPHONE (OUTPATIENT)
Dept: OBSTETRICS AND GYNECOLOGY | Facility: CLINIC | Age: 34
End: 2024-02-21
Payer: COMMERCIAL

## 2024-02-21 NOTE — TELEPHONE ENCOUNTER
----- Message from Chloe Garvey sent at 2/21/2024  3:16 PM CST -----  Name of Who is calling :  ALAN TUTTLE [[id number]]      What is the request in detail:  Pt needs her IUD removed it sticking out. Can she come in today? Please assist       Can the clinic reply by [facility]:  No           What [facility]: [phone]

## 2024-02-22 ENCOUNTER — OFFICE VISIT (OUTPATIENT)
Dept: OBSTETRICS AND GYNECOLOGY | Facility: CLINIC | Age: 34
End: 2024-02-22
Payer: COMMERCIAL

## 2024-02-22 VITALS
HEIGHT: 70 IN | BODY MASS INDEX: 18.81 KG/M2 | DIASTOLIC BLOOD PRESSURE: 73 MMHG | SYSTOLIC BLOOD PRESSURE: 120 MMHG | WEIGHT: 131.38 LBS

## 2024-02-22 DIAGNOSIS — Z30.431 IUD CHECK UP: Primary | ICD-10-CM

## 2024-02-22 PROCEDURE — 1159F MED LIST DOCD IN RCRD: CPT | Mod: CPTII,S$GLB,, | Performed by: OBSTETRICS & GYNECOLOGY

## 2024-02-22 PROCEDURE — 3008F BODY MASS INDEX DOCD: CPT | Mod: CPTII,S$GLB,, | Performed by: OBSTETRICS & GYNECOLOGY

## 2024-02-22 PROCEDURE — 3078F DIAST BP <80 MM HG: CPT | Mod: CPTII,S$GLB,, | Performed by: OBSTETRICS & GYNECOLOGY

## 2024-02-22 PROCEDURE — 3074F SYST BP LT 130 MM HG: CPT | Mod: CPTII,S$GLB,, | Performed by: OBSTETRICS & GYNECOLOGY

## 2024-02-22 PROCEDURE — 99999 PR PBB SHADOW E&M-EST. PATIENT-LVL III: CPT | Mod: PBBFAC,,, | Performed by: OBSTETRICS & GYNECOLOGY

## 2024-02-22 PROCEDURE — 99212 OFFICE O/P EST SF 10 MIN: CPT | Mod: S$GLB,,, | Performed by: OBSTETRICS & GYNECOLOGY

## 2024-02-22 NOTE — PROGRESS NOTES
History & Physical  Gynecology      SUBJECTIVE:     Chief Complaint: IUD Check       History of Present Illness:  Ms. Rodrigues is a 33 y.o. female who returns 4 weeks after an IUD placement.  Reports that she is feeling the strings in her vagina.  Has no pain, but does have some bleeding.    Review of Systems:  Review of Systems   Genitourinary:  Negative for menorrhagia, menstrual problem, pelvic pain, vaginal bleeding, vaginal discharge, vaginal pain and vaginal odor.        OBJECTIVE:     Physical Exam:  Physical Exam  Vitals and nursing note reviewed.   Constitutional:       Appearance: She is well-developed.   Pulmonary:      Effort: Pulmonary effort is normal.   Genitourinary:     Labia:         Right: No rash, tenderness, lesion or injury.         Left: No rash, tenderness, lesion or injury.       Vagina: Normal. No signs of injury and foreign body. No vaginal discharge, erythema, tenderness or bleeding.      Cervix: No cervical motion tenderness, discharge or friability.      Comments: Strings visualized  Neurological:      Mental Status: She is alert and oriented to person, place, and time.   Psychiatric:         Behavior: Behavior normal.         Thought Content: Thought content normal.         Judgment: Judgment normal.         ASSESSMENT:       ICD-10-CM ICD-9-CM    1. IUD check up  Z30.431 V25.42              Plan:      Yadira was seen today for iud check.    Diagnoses and all orders for this visit:    IUD check up        No orders of the defined types were placed in this encounter.      IUD strings visualized on exam.  Strings did require trimming.    Follow up for annual exam.     Mary Grace Benitez

## 2024-02-27 ENCOUNTER — PATIENT MESSAGE (OUTPATIENT)
Dept: PSYCHIATRY | Facility: CLINIC | Age: 34
End: 2024-02-27

## 2024-02-27 ENCOUNTER — OFFICE VISIT (OUTPATIENT)
Dept: PSYCHIATRY | Facility: CLINIC | Age: 34
End: 2024-02-27
Payer: COMMERCIAL

## 2024-02-27 DIAGNOSIS — F41.1 GENERALIZED ANXIETY DISORDER: ICD-10-CM

## 2024-02-27 DIAGNOSIS — F51.05 INSOMNIA DUE TO MENTAL DISORDER: Primary | ICD-10-CM

## 2024-02-27 PROCEDURE — 90834 PSYTX W PT 45 MINUTES: CPT | Mod: 95,,, | Performed by: PSYCHOLOGIST

## 2024-02-27 NOTE — PROGRESS NOTES
Individual Psychotherapy (PhD/LCSW)    2/27/2024    Site:  Telemed     The [place] location [place]: [place] ([place])  The chief complaint leading to consultation is: insomnia    Visit type: audiovisual    Face to Face time with patient: 41  46 minutes of total time spent on the encounter, which includes face to face time and non-face to face time preparing to see the patient (eg, review of tests), Obtaining and/or reviewing separately obtained history, Documenting clinical information in the electronic or other health record, Independently interpreting results (not separately reported) and communicating results to the patient/family/caregiver, or Care coordination (not separately reported).         Each patient to whom he or she provides medical services by telemedicine is:  (1) informed of the relationship between the physician and patient and the respective role of any other health care provider with respect to management of the patient; and (2) notified that he or she may decline to receive medical services by telemedicine and may withdraw from such care at any time.    Notes:     Therapeutic Intervention: Met with patient.  Outpatient - Insight oriented psychotherapy 45 min - CPT code 13949 and Outpatient - Behavior modifying psychotherapy 45 min - CPT code 79859    Chief complaint/reason for encounter: sleep     Interval history and content of current session:   Cognitive Behavioral Therapy for Insomnia (CBT-i), Session #1   Session Focus:   Introduction to CBT-i   Sleep and Insomnia Basic Concepts   Sleep medications   Sleep Diary & CBT-i      FOREST:  13  ?   Practice Assignments:   1) Review treatment materials as needed.   2) Complete the Sleep Diary every day.  Fill it out within two hours of getting up.        Treatment plan:  Target symptoms:  insomnia  Why chosen therapy is appropriate versus another modality: relevant to diagnosis, evidence based practice  Outcome monitoring methods: self-report,  checklist/rating scale  Therapeutic intervention type: insight oriented psychotherapy, behavior modifying psychotherapy    Risk parameters:  Patient reports no suicidal ideation  Patient reports no homicidal ideation  Patient reports no self-injurious behavior  Patient reports no violent behavior    Verbal deficits: None    Patient's response to intervention:  The patient's response to intervention is accepting.    Progress toward goals and other mental status changes:  The patient's progress toward goals is fair .    Diagnosis:     ICD-10-CM ICD-9-CM   1. Insomnia due to mental disorder  F51.05 300.9     327.02   2. Generalized anxiety disorder  F41.1 300.02       Plan:  individual psychotherapy    Return to clinic: 1 week    Length of Service (minutes): 45

## 2024-03-05 ENCOUNTER — PATIENT MESSAGE (OUTPATIENT)
Dept: PSYCHIATRY | Facility: CLINIC | Age: 34
End: 2024-03-05

## 2024-03-05 ENCOUNTER — OFFICE VISIT (OUTPATIENT)
Dept: PSYCHIATRY | Facility: CLINIC | Age: 34
End: 2024-03-05
Payer: COMMERCIAL

## 2024-03-05 DIAGNOSIS — F51.05 INSOMNIA DUE TO MENTAL DISORDER: Primary | ICD-10-CM

## 2024-03-05 DIAGNOSIS — F41.1 GENERALIZED ANXIETY DISORDER: ICD-10-CM

## 2024-03-05 PROCEDURE — 90834 PSYTX W PT 45 MINUTES: CPT | Mod: 95,,, | Performed by: PSYCHOLOGIST

## 2024-03-05 NOTE — PROGRESS NOTES
Individual Psychotherapy (PhD/LCSW)    3/5/2024    Site:  Telemed     The [place] ([place])  The chief complaint leading to consultation is: insomnia    Visit type: audiovisual    Face to Face time with patient: 39  45 minutes of total time spent on the encounter, which includes face to face time and non-face to face time preparing to see the patient (eg, review of tests), Obtaining and/or reviewing separately obtained history, Documenting clinical information in the electronic or other health record, Independently interpreting results (not separately reported) and communicating results to the patient/family/caregiver, or Care coordination (not separately reported).         Each patient to whom he or she provides medical services by telemedicine is:  (1) informed of the relationship between the physician and patient and the respective role of any other health care provider with respect to management of the patient; and (2) notified that he or she may decline to receive medical services by telemedicine and may withdraw from such care at any time.    Notes:     Therapeutic Intervention: Met with patient.  Outpatient - Insight oriented psychotherapy 45 min - CPT code 13173 and Outpatient - Behavior modifying psychotherapy 45 min - CPT code 40674    Chief complaint/reason for encounter: sleep     Interval history and content of current session:   Cognitive Behavioral Therapy for Insomnia (CBT-i), Session #2   Sleep Diary completed?  Yes   # of days completed:  6     Session Focus:   Summarize and graph Sleep Diary   SE:  75%  Introduce Stimulus Control and Sleep Hygiene   Introduce Sleep Restriction   Prescribed TTB:  9:55  Prescribed TOB:  4:30     Practice Assignments:   1) Review treatment materials as needed.   2) Complete the Sleep Diary every day.  Fill it out within two hours of getting up.   3) Implement Stimulus Control and Sleep Hygiene strategies   4) Implement Sleep Restriction/Compression         Treatment plan:  Target symptoms:  insomnia  Why chosen therapy is appropriate versus another modality: relevant to diagnosis, evidence based practice  Outcome monitoring methods: self-report, checklist/rating scale  Therapeutic intervention type: insight oriented psychotherapy, behavior modifying psychotherapy    Risk parameters:  Patient reports no suicidal ideation  Patient reports no homicidal ideation  Patient reports no self-injurious behavior  Patient reports no violent behavior    Verbal deficits: None    Patient's response to intervention:  The patient's response to intervention is accepting.    Progress toward goals and other mental status changes:  The patient's progress toward goals is fair .    Diagnosis:     ICD-10-CM ICD-9-CM   1. Insomnia due to mental disorder  F51.05 300.9     327.02   2. Generalized anxiety disorder  F41.1 300.02         Plan:  individual psychotherapy    Return to clinic: 1 week    Length of Service (minutes): 45

## 2024-03-12 ENCOUNTER — OFFICE VISIT (OUTPATIENT)
Dept: PSYCHIATRY | Facility: CLINIC | Age: 34
End: 2024-03-12
Payer: COMMERCIAL

## 2024-03-12 DIAGNOSIS — F41.8 POSTPARTUM ANXIETY: ICD-10-CM

## 2024-03-12 DIAGNOSIS — F51.05 INSOMNIA DUE TO MENTAL DISORDER: ICD-10-CM

## 2024-03-12 DIAGNOSIS — F41.1 GENERALIZED ANXIETY DISORDER: Primary | ICD-10-CM

## 2024-03-12 PROCEDURE — 99213 OFFICE O/P EST LOW 20 MIN: CPT | Mod: 95,,, | Performed by: STUDENT IN AN ORGANIZED HEALTH CARE EDUCATION/TRAINING PROGRAM

## 2024-03-12 NOTE — PROGRESS NOTES
OUTPATIENT PSYCHIATRY RETURN VISIT    ENCOUNTER DATE:  3/12/24   SITE:  [facility]  LENGTH OF SESSION:  20    The patient location is:  [place], not in a healthcare facility  Visit type:  audiovisual    20   minutes of total time spent on the encounter, which includes face to face time and non-face to face time preparing to see the patient (eg, review of tests), Obtaining and/or reviewing separately obtained history, Documenting clinical information in the electronic or other health record, Independently interpreting results (not separately reported) and communicating results to the patient/family/caregiver, or Care coordination (not separately reported).     Each patient to whom he or she provides medical services by telemedicine is:  (1) informed of the relationship between the physician and patient and the respective role of any other health care provider with respect to management of the patient; and (2) notified that he or she may decline to receive medical services by telemedicine and may withdraw from such care at any time.    CHIEF COMPLAINT:  No chief complaint on file.      HISTORY OF PRESENTING ILLNESS:  Yadira Rodrigues is a 33 y.o. female with history of Anxiety who presents for follow up appointment. She feels like her sleep is slowly improving. CBTi is not a great fit for her. Daughter is sleeping better and she is getting into more of a routine. She takes 1/2 a xanax about every 4 nights to get to sleep. The other nights she takes a whole unisom. Right now, once she takes the medication she stays asleep the whole night. Anxiety in the daytime has been overall stable. She has some intrusive thoughts but is able to manage them better.   They still have a night nurse who helps out sometimes. She feels like things are going pretty well. Talked about sleep association. She feels like she is on a positive trend. Some ups and down, feels like the zoloft helps her. She is  working a little bit and is going back to work from home, full time, in a month's time.   No SI, no thoughts of hurting infant.    Medication side effects:  No  Medication compliance:  Yes    PSYCHIATRIC REVIEW OF SYSTEMS:  Trouble with sleep:  Yes as above  Appetite changes:  Not discussed  Weight changes:  Not discussed  Lack of energy:  Denies  Anhedonia:  Denies  Somatic symptoms:  Denies  Libido:  Not discussed  Anxiety/panic:  Yes as above   Guilty/hopeless:  Denies  Self-injurious behavior/risky behavior:  Denies  Any drugs:  Not discussed  Alcohol:  Not discussed  Breastfeeding:  Only during the day (has 7-9 hour stretch when she does not pump or breastfeed at night)    MEDICAL REVIEW OF SYSTEMS:  Complete review of systems performed covering Constitutional, Musculoskeletal, Neurologic.  All systems negative except for that covered in HPI.    PAST PSYCHIATRIC, MEDICAL, AND SOCIAL HISTORY REVIEWED  The patient's past medical, family and social history have been reviewed and updated as appropriate within the electronic medical record - see encounter notes.    MEDICATIONS:    Current Outpatient Medications:     ALPRAZolam (XANAX) 0.5 MG tablet, Take 1 tablet (0.5 mg total) by mouth nightly as needed for Insomnia or Anxiety., Disp: 30 tablet, Rfl: 0    prenatal 25/iron fum/folic/dha (PRENATAL-1 ORAL), Take by mouth., Disp: , Rfl:     sertraline (ZOLOFT) 50 MG tablet, Take 1 tablet (50 mg total) by mouth once daily., Disp: 30 tablet, Rfl: 3    Current Facility-Administered Medications:     levonorgestreL (MIRENA) 21 mcg/24 hours (8 yrs) 52 mg IUD 1 Intra Uterine Device, 1 Intra Uterine Device, Intrauterine, , Mary Grace Benitez MD, 1 Intra Uterine Device at 01/25/24 7746    ALLERGIES:  Review of patient's allergies indicates:   Allergen Reactions    Codeine Nausea And Vomiting       PSYCHIATRIC EXAM:  There were no vitals filed for this visit.  Appearance:  Well groomed, appearing healthy and of stated  "age  Behavior:  Cooperative, pleasant, no psychomotor agitation or retardation  Speech:  Normal rate, rhythm, prosody, and volume  Mood:  "better but the insomnia is terrible"  Affect:  Euthymic  Thought Process:  Linear, logical, goal directed  Thought Content:  Negative for suicidal ideation, homicidal ideation, delusions or hallucinations.  Associations:  Intact  Memory:  Grossly Intact  Level of Consciousness/Orientation:  Grossly intact  Fund of Knowledge:  Good  Attention:  Good  Language:  Fluent, able to name abstract and concrete objects  Insight:  Good  Judgment:  Intact  Psychomotor signs:  No involuntary movements of face  Gait:  Unable to assess via virtual visit      RELEVANT LABS/STUDIES:    Lab Results   Component Value Date    WBC 12.87 (H) 12/21/2023    HGB 9.4 (L) 12/21/2023    HCT 27.3 (L) 12/21/2023    MCV 93 12/21/2023     (L) 12/21/2023     BMP  Lab Results   Component Value Date     (L) 07/30/2023    K 3.8 07/30/2023     07/30/2023    CO2 19 (L) 07/30/2023    BUN 5 (L) 07/30/2023    CREATININE 0.6 07/30/2023    CALCIUM 8.9 07/30/2023    ANIONGAP 8 07/30/2023    ESTGFRAFRICA >60 02/11/2022    EGFRNONAA >60 02/11/2022     Lab Results   Component Value Date    ALT 15 07/30/2023    AST 18 07/30/2023    ALKPHOS 46 (L) 07/30/2023    BILITOT 0.8 07/30/2023     Lab Results   Component Value Date    TSH 1.337 08/01/2022     No results found for: "LABA1C", "HGBA1C"    IMPRESSION:    Yadira Rodrigues is a 33 y.o. female with history of Anxiety who presents for follow up appointment.    Status/Progress:  Based on the examination today, the patient's problem(s) is/are inadequately controlled.  New problems have been presented today.     Risk Parameters:  Patient reports no suicidal ideation  Patient reports no homicidal ideation  Patient reports no self-injurious behavior  Patient reports no violent behavior    DIAGNOSES:  Postpartum anxiety  Insomnia d/t mental disorder  Hx of " performance anxiety    PLAN:  Continue Zoloft 50mg daily to better target anxiety with intrusive thoughts- pt has seen benefit at this dose.  Low threshold to increase to 100mg daily at next appointment depending on severity of her anxiety.  Pt did not respond to trazodone.   Pt can alternate unisom and  Xanax 0.25-0.5mg PRN.The soonest the patient would be breastfeeding again after taking Xanax would be 6 hours later. Discussed r/b/SE of this medication and discussed monitoring infant for sedation. Infant has not had any issues.   Discussed with patient informed consent, risks versus benefits, alternative treatments, side effect profile and the inherent unpredictability of individual responses to these treatments.  The patient expresses understanding of the above and displays the capacity to agree with this current plan.  Referral to [facility]  therapist.  F/u 2 month  Reviewed emergency precautions and numbers  No SI, HI, AVH. No thoughts of hurting infant.

## 2024-03-14 ENCOUNTER — OFFICE VISIT (OUTPATIENT)
Dept: INTERNAL MEDICINE | Facility: CLINIC | Age: 34
End: 2024-03-14
Payer: COMMERCIAL

## 2024-03-14 VITALS
SYSTOLIC BLOOD PRESSURE: 100 MMHG | HEART RATE: 79 BPM | OXYGEN SATURATION: 99 % | WEIGHT: 125.44 LBS | DIASTOLIC BLOOD PRESSURE: 60 MMHG | BODY MASS INDEX: 18 KG/M2

## 2024-03-14 DIAGNOSIS — R19.7 DIARRHEA, UNSPECIFIED TYPE: Primary | ICD-10-CM

## 2024-03-14 PROCEDURE — 99213 OFFICE O/P EST LOW 20 MIN: CPT | Mod: S$GLB,,, | Performed by: STUDENT IN AN ORGANIZED HEALTH CARE EDUCATION/TRAINING PROGRAM

## 2024-03-14 PROCEDURE — 3074F SYST BP LT 130 MM HG: CPT | Mod: CPTII,S$GLB,, | Performed by: STUDENT IN AN ORGANIZED HEALTH CARE EDUCATION/TRAINING PROGRAM

## 2024-03-14 PROCEDURE — 1159F MED LIST DOCD IN RCRD: CPT | Mod: CPTII,S$GLB,, | Performed by: STUDENT IN AN ORGANIZED HEALTH CARE EDUCATION/TRAINING PROGRAM

## 2024-03-14 PROCEDURE — 3078F DIAST BP <80 MM HG: CPT | Mod: CPTII,S$GLB,, | Performed by: STUDENT IN AN ORGANIZED HEALTH CARE EDUCATION/TRAINING PROGRAM

## 2024-03-14 PROCEDURE — 99999 PR PBB SHADOW E&M-EST. PATIENT-LVL III: CPT | Mod: PBBFAC,,, | Performed by: STUDENT IN AN ORGANIZED HEALTH CARE EDUCATION/TRAINING PROGRAM

## 2024-03-14 PROCEDURE — 3008F BODY MASS INDEX DOCD: CPT | Mod: CPTII,S$GLB,, | Performed by: STUDENT IN AN ORGANIZED HEALTH CARE EDUCATION/TRAINING PROGRAM

## 2024-03-14 NOTE — PROGRESS NOTES
[facility]    Subjective:     Patient ID: Yadira Rodrigues is a 33 y.o. female.  Chief Complaint: Diarrhea (Diarrhea x 2 weeks)    HPI:  Patient is a 33 y.o. female who   has a past medical history of Abnormal Pap smear of cervix.  who presents for diarrhea.     Sx started 2-3 weeks ago and have not resolved. Started taking imodium about once daily a week following symptom onset. This has been helping but has not resolved her the diarrhea. Is concerned abut taking too much because she is breast feeding.     Is having 5 watery bowel movements per day when she does not take imodium. Ate some  ketchup prior to this but no other different foods. No seafood. She is a vegan. Eats a lot of high fiber foods. Last night had brown rice with bean chili. Does not like white rice/ bland foods.     Started Zoloft 11 weeks ago.     No fevers, feels otherwise fine. Had baby girl this past December. Did not take antibiotics while in the hospital.     No blood in the stools.    Current Outpatient Medications   Medication Instructions    ALPRAZolam (XANAX) 0.5 mg, Oral, Nightly PRN    diphenhydramine HCl (UNISOM, DIPHENHYDRAMINE, ORAL) Oral    prenatal 25/iron fum/folic/dha (PRENATAL-1 ORAL) Oral    sertraline (ZOLOFT) 50 mg, Oral, Daily       Objective:        Body mass index is 18 kg/m².  Vitals:    24 0916   BP: 100/60   Pulse: 79   SpO2: 99%   Weight: 56.9 kg (125 lb 7.1 oz)   PainSc: 0-No pain     Physical Exam  Cardiovascular:      Rate and Rhythm: Normal rate.      Heart sounds: No murmur heard.  Pulmonary:      Effort: Pulmonary effort is normal. No respiratory distress.   Abdominal:      General: Abdomen is flat.   Musculoskeletal:      Right lower leg: No edema.      Left lower leg: No edema.   Skin:     General: Skin is warm and dry.   Neurological:      Mental Status: She is alert.           Assessment:         1. Diarrhea, unspecified type          Plan:         Diarrhea, unspecified  type  -     Gastrointestinal Pathogens Panel, PCR; Future; Expected date: 03/14/2024  -     CLOSTRIDIUM DIFFICILE; Future; Expected date: 03/14/2024          All of your core healthy metrics are met.    No follow-ups on file. or sooner prn (as needed)        Shane May  [facility]  [address] [address]  Phone [phone]  Fax [phone]      This note is dictated using the M*Modal Fluency Direct word recognition program. It may contain word recognition mistakes or wrong word substitutions that were missed on review.

## 2024-03-18 ENCOUNTER — LAB VISIT (OUTPATIENT)
Dept: LAB | Facility: OTHER | Age: 34
End: 2024-03-18
Attending: STUDENT IN AN ORGANIZED HEALTH CARE EDUCATION/TRAINING PROGRAM
Payer: COMMERCIAL

## 2024-03-18 DIAGNOSIS — R19.7 DIARRHEA, UNSPECIFIED TYPE: ICD-10-CM

## 2024-03-18 LAB
C DIFF GDH STL QL: NEGATIVE
C DIFF TOX A+B STL QL IA: NEGATIVE

## 2024-03-18 PROCEDURE — 87507 IADNA-DNA/RNA PROBE TQ 12-25: CPT | Performed by: STUDENT IN AN ORGANIZED HEALTH CARE EDUCATION/TRAINING PROGRAM

## 2024-03-18 PROCEDURE — 87449 NOS EACH ORGANISM AG IA: CPT | Performed by: STUDENT IN AN ORGANIZED HEALTH CARE EDUCATION/TRAINING PROGRAM

## 2024-03-19 ENCOUNTER — PATIENT MESSAGE (OUTPATIENT)
Dept: INTERNAL MEDICINE | Facility: CLINIC | Age: 34
End: 2024-03-19
Payer: COMMERCIAL

## 2024-03-19 DIAGNOSIS — R19.7 DIARRHEA, UNSPECIFIED TYPE: Primary | ICD-10-CM

## 2024-03-21 ENCOUNTER — TELEPHONE (OUTPATIENT)
Dept: INTERNAL MEDICINE | Facility: CLINIC | Age: 34
End: 2024-03-21
Payer: COMMERCIAL

## 2024-03-21 DIAGNOSIS — R19.7 DIARRHEA, UNSPECIFIED TYPE: Primary | ICD-10-CM

## 2024-03-21 LAB
CAMPY SP DNA.DIARRHEA STL QL NAA+PROBE: NOT DETECTED
CRYPTOSP DNA SPEC QL NAA+PROBE: NOT DETECTED
E COLI O157H7 DNA SPEC QL NAA+PROBE: NOT DETECTED
E HISTOLYT DNA SPEC QL NAA+PROBE: NOT DETECTED
G LAMBLIA DNA SPEC QL NAA+PROBE: NOT DETECTED
GPP - ADENOVIRUS 40/41: NOT DETECTED
GPP - ENTEROTOXIGENIC E COLI (ETEC): NOT DETECTED
GPP - SHIGELLA: NOT DETECTED
LACTATE PLASV-SCNC: NOT DETECTED MMOL/L
NOROVIRUS RNA STL QL NAA+PROBE: NOT DETECTED
RV DSRNA STL QL NAA+PROBE: NOT DETECTED
SALMONELLA DNA SPEC QL NAA+PROBE: NOT DETECTED
V CHOLERAE DNA SPEC QL NAA+PROBE: NOT DETECTED
Y ENTERO RECN STL QL NAA+PROBE: NOT DETECTED

## 2024-03-21 RX ORDER — AZITHROMYCIN 500 MG/1
1000 TABLET, FILM COATED ORAL ONCE
Qty: 2 TABLET | Refills: 0 | Status: SHIPPED | OUTPATIENT
Start: 2024-03-21 | End: 2024-03-21

## 2024-03-21 NOTE — TELEPHONE ENCOUNTER
Called patient to discuss symptoms and results of GI PCR.  This was negative however she has had sudden worsening in his wondering if she may have picked something else up in the meantime.  In that case she can go ahead and trial empiric treatment with 1 g azithromycin for 1 day.  Discussed that it is present in breast milk though unlikely to cause harm to the baby.  Out of an abundance of cough she may discard breast milk for the 1st 24 hours following her dose.  Advised her to seek treatment in the ER should she experience severe abdominal pain, fever, or inability to tolerate p.o..  She reports she was tolerating p.o. and urinating frequently.  I will refer to [facility] for expedited evaluation.

## 2024-04-30 ENCOUNTER — PATIENT MESSAGE (OUTPATIENT)
Dept: PSYCHIATRY | Facility: CLINIC | Age: 34
End: 2024-04-30
Payer: COMMERCIAL

## 2024-05-07 ENCOUNTER — OFFICE VISIT (OUTPATIENT)
Dept: PSYCHIATRY | Facility: CLINIC | Age: 34
End: 2024-05-07
Payer: COMMERCIAL

## 2024-05-07 DIAGNOSIS — F41.8 POSTPARTUM ANXIETY: Primary | ICD-10-CM

## 2024-05-07 DIAGNOSIS — F51.05 INSOMNIA DUE TO MENTAL DISORDER: ICD-10-CM

## 2024-05-07 PROCEDURE — 99214 OFFICE O/P EST MOD 30 MIN: CPT | Mod: 95,,, | Performed by: STUDENT IN AN ORGANIZED HEALTH CARE EDUCATION/TRAINING PROGRAM

## 2024-05-07 RX ORDER — ALPRAZOLAM 0.5 MG/1
0.5 TABLET ORAL NIGHTLY PRN
Qty: 30 TABLET | Refills: 2 | Status: SHIPPED | OUTPATIENT
Start: 2024-05-07 | End: 2025-05-07

## 2024-05-07 RX ORDER — SERTRALINE HYDROCHLORIDE 50 MG/1
50 TABLET, FILM COATED ORAL DAILY
Qty: 30 TABLET | Refills: 3 | Status: SHIPPED | OUTPATIENT
Start: 2024-05-07

## 2024-05-07 NOTE — PROGRESS NOTES
"OUTPATIENT PSYCHIATRY RETURN VISIT    ENCOUNTER DATE:  5/7/24   SITE:  [facility]  LENGTH OF SESSION:  20    The patient location is:  [place], not in a healthcare facility  Visit type:  audiovisual    15   minutes of total time spent on the encounter, which includes face to face time and non-face to face time preparing to see the patient (eg, review of tests), Obtaining and/or reviewing separately obtained history, Documenting clinical information in the electronic or other health record, Independently interpreting results (not separately reported) and communicating results to the patient/family/caregiver, or Care coordination (not separately reported).     Each patient to whom he or she provides medical services by telemedicine is:  (1) informed of the relationship between the physician and patient and the respective role of any other health care provider with respect to management of the patient; and (2) notified that he or she may decline to receive medical services by telemedicine and may withdraw from such care at any time.    CHIEF COMPLAINT:  No chief complaint on file.      HISTORY OF PRESENTING ILLNESS:  Yadira Rodrigues is a 33 y.o. female with history of Anxiety who presents for follow up appointment.  Sleep is "sort of better." She feels like her anxiety is better too. She feels like she gets 6-7 hours of sleep a night. Baby is 4.5 months- waking up intermittently overnight.Taking 1/2 unisom nightly, takes 1/2 xanax 1-2x a week. She feels like her sleep is slowly improving.  She is not anxious when she goes to bed, she is tired, but still is having issues falling asleep.   Anxiety is "pretty good." She still has moments of being anxious. Still has some intrusive thoughts but she is able to move past them.   Still taking the zoloft 50 mg qam. She is not having any side effects.   Daughter is doing well. Gaining weight, happy little baby.  Pt is back at work. She is glad to " be back at work. She has a nanny who she trusts. She is happy to be back at work. No SI, HI, AVH.    Medication side effects:  No  Medication compliance:  Yes    PSYCHIATRIC REVIEW OF SYSTEMS:  Trouble with sleep:  Yes as above  Appetite changes:  Not discussed  Weight changes:  Not discussed  Lack of energy:  Denies  Anhedonia:  Denies  Somatic symptoms:  Denies  Libido:  Not discussed  Anxiety/panic:  Yes as above - improving  Guilty/hopeless:  Denies  Self-injurious behavior/risky behavior:  Denies  Any drugs:  Not discussed  Alcohol:  Not discussed  Breastfeeding:  Only during the day (has 7-9 hour stretch when she does not pump or breastfeed at night)    MEDICAL REVIEW OF SYSTEMS:  Complete review of systems performed covering Constitutional, Musculoskeletal, Neurologic.  All systems negative except for that covered in HPI.    PAST PSYCHIATRIC, MEDICAL, AND SOCIAL HISTORY REVIEWED  The patient's past medical, family and social history have been reviewed and updated as appropriate within the electronic medical record - see encounter notes.    MEDICATIONS:    Current Outpatient Medications:     ALPRAZolam (XANAX) 0.5 MG tablet, Take 1 tablet (0.5 mg total) by mouth nightly as needed for Insomnia or Anxiety., Disp: 30 tablet, Rfl: 0    diphenhydramine HCl (UNISOM, DIPHENHYDRAMINE, ORAL), Take by mouth., Disp: , Rfl:     prenatal 25/iron fum/folic/dha (PRENATAL-1 ORAL), Take by mouth., Disp: , Rfl:     sertraline (ZOLOFT) 50 MG tablet, Take 1 tablet (50 mg total) by mouth once daily., Disp: 30 tablet, Rfl: 3    Current Facility-Administered Medications:     levonorgestreL (MIRENA) 21 mcg/24 hours (8 yrs) 52 mg IUD 1 Intra Uterine Device, 1 Intra Uterine Device, Intrauterine, , Mary Grace Benitez MD, 1 Intra Uterine Device at 01/25/24 3770    ALLERGIES:  Review of patient's allergies indicates:   Allergen Reactions    Codeine Nausea And Vomiting       PSYCHIATRIC EXAM:  There were no vitals filed for this  "visit.  Appearance:  Well groomed, appearing healthy and of stated age  Behavior:  Cooperative, pleasant, no psychomotor agitation or retardation  Speech:  Normal rate, rhythm, prosody, and volume  Mood:  "bsleep is better. Anxiety is better"  Affect:  Euthymic  Thought Process:  Linear, logical, goal directed  Thought Content:  Negative for suicidal ideation, homicidal ideation, delusions or hallucinations.  Associations:  Intact  Memory:  Grossly Intact  Level of Consciousness/Orientation:  Grossly intact  Fund of Knowledge:  Good  Attention:  Good  Language:  Fluent, able to name abstract and concrete objects  Insight:  Good  Judgment:  Intact  Psychomotor signs:  No involuntary movements of face  Gait:  Unable to assess via virtual visit      RELEVANT LABS/STUDIES:    Lab Results   Component Value Date    WBC 12.87 (H) 12/21/2023    HGB 9.4 (L) 12/21/2023    HCT 27.3 (L) 12/21/2023    MCV 93 12/21/2023     (L) 12/21/2023     BMP  Lab Results   Component Value Date     (L) 07/30/2023    K 3.8 07/30/2023     07/30/2023    CO2 19 (L) 07/30/2023    BUN 5 (L) 07/30/2023    CREATININE 0.6 07/30/2023    CALCIUM 8.9 07/30/2023    ANIONGAP 8 07/30/2023    ESTGFRAFRICA >60 02/11/2022    EGFRNONAA >60 02/11/2022     Lab Results   Component Value Date    ALT 15 07/30/2023    AST 18 07/30/2023    ALKPHOS 46 (L) 07/30/2023    BILITOT 0.8 07/30/2023     Lab Results   Component Value Date    TSH 1.337 08/01/2022     No results found for: "LABA1C", "HGBA1C"    IMPRESSION:    Yadira Rodrigues is a 33 y.o. female with history of Anxiety who presents for follow up appointment.    Status/Progress:  Based on the examination today, the patient's problem(s) is/are inadequately controlled.  New problems have been presented today.     Risk Parameters:  Patient reports no suicidal ideation  Patient reports no homicidal ideation  Patient reports no self-injurious behavior  Patient reports no violent " behavior    DIAGNOSES:  Postpartum anxiety  Insomnia d/t mental disorder  Hx of performance anxiety    PLAN:  Continue Zoloft 50mg daily to better target anxiety with intrusive thoughts- pt has seen benefit at this dose.  Low threshold to increase to 100mg daily at next appointment depending on severity of her anxiety.  Pt did not respond to trazodone.   Pt can alternate unisom and  Xanax 0.25-0.5mg PRN.The soonest the patient would be breastfeeding again after taking Xanax would be 6 hours later. Discussed r/b/SE of this medication and discussed monitoring infant for sedation. Infant has not had any issues.   Discussed with patient informed consent, risks versus benefits, alternative treatments, side effect profile and the inherent unpredictability of individual responses to these treatments.  The patient expresses understanding of the above and displays the capacity to agree with this current plan.  Referral to [facility].  F/u 3-4 month  Reviewed emergency precautions and numbers  No SI, HI, AVH. No thoughts of hurting infant.

## 2024-05-17 ENCOUNTER — OFFICE VISIT (OUTPATIENT)
Dept: INTERNAL MEDICINE | Facility: CLINIC | Age: 34
End: 2024-05-17
Payer: COMMERCIAL

## 2024-05-17 ENCOUNTER — LAB VISIT (OUTPATIENT)
Dept: LAB | Facility: OTHER | Age: 34
End: 2024-05-17
Attending: STUDENT IN AN ORGANIZED HEALTH CARE EDUCATION/TRAINING PROGRAM
Payer: COMMERCIAL

## 2024-05-17 VITALS
DIASTOLIC BLOOD PRESSURE: 70 MMHG | HEIGHT: 70 IN | BODY MASS INDEX: 17.65 KG/M2 | HEART RATE: 67 BPM | WEIGHT: 123.25 LBS | SYSTOLIC BLOOD PRESSURE: 98 MMHG | OXYGEN SATURATION: 100 %

## 2024-05-17 DIAGNOSIS — G47.00 INSOMNIA, UNSPECIFIED TYPE: ICD-10-CM

## 2024-05-17 DIAGNOSIS — Z00.00 ANNUAL PHYSICAL EXAM: Primary | ICD-10-CM

## 2024-05-17 DIAGNOSIS — Z86.2 HISTORY OF ANEMIA: ICD-10-CM

## 2024-05-17 DIAGNOSIS — Z78.9 CONSUMES A VEGAN DIET: ICD-10-CM

## 2024-05-17 DIAGNOSIS — Z00.00 ANNUAL PHYSICAL EXAM: ICD-10-CM

## 2024-05-17 LAB
25(OH)D3+25(OH)D2 SERPL-MCNC: 36 NG/ML (ref 30–96)
ALBUMIN SERPL BCP-MCNC: 4.4 G/DL (ref 3.5–5.2)
ALP SERPL-CCNC: 97 U/L (ref 55–135)
ALT SERPL W/O P-5'-P-CCNC: 15 U/L (ref 10–44)
ANION GAP SERPL CALC-SCNC: 8 MMOL/L (ref 8–16)
AST SERPL-CCNC: 16 U/L (ref 10–40)
BASOPHILS # BLD AUTO: 0.04 K/UL (ref 0–0.2)
BASOPHILS NFR BLD: 0.8 % (ref 0–1.9)
BILIRUB SERPL-MCNC: 1 MG/DL (ref 0.1–1)
BUN SERPL-MCNC: 10 MG/DL (ref 6–20)
CALCIUM SERPL-MCNC: 9.6 MG/DL (ref 8.7–10.5)
CHLORIDE SERPL-SCNC: 104 MMOL/L (ref 95–110)
CHOLEST SERPL-MCNC: 152 MG/DL (ref 120–199)
CHOLEST/HDLC SERPL: 1.9 {RATIO} (ref 2–5)
CO2 SERPL-SCNC: 25 MMOL/L (ref 23–29)
CREAT SERPL-MCNC: 0.9 MG/DL (ref 0.5–1.4)
DIFFERENTIAL METHOD BLD: NORMAL
EOSINOPHIL # BLD AUTO: 0.2 K/UL (ref 0–0.5)
EOSINOPHIL NFR BLD: 3.9 % (ref 0–8)
ERYTHROCYTE [DISTWIDTH] IN BLOOD BY AUTOMATED COUNT: 13 % (ref 11.5–14.5)
EST. GFR  (NO RACE VARIABLE): >60 ML/MIN/1.73 M^2
ESTIMATED AVG GLUCOSE: 94 MG/DL (ref 68–131)
FERRITIN SERPL-MCNC: 31 NG/ML (ref 20–300)
FOLATE SERPL-MCNC: >40 NG/ML (ref 4–24)
GLUCOSE SERPL-MCNC: 81 MG/DL (ref 70–110)
HBA1C MFR BLD: 4.9 % (ref 4–5.6)
HCT VFR BLD AUTO: 41.3 % (ref 37–48.5)
HDLC SERPL-MCNC: 80 MG/DL (ref 40–75)
HDLC SERPL: 52.6 % (ref 20–50)
HGB BLD-MCNC: 13.4 G/DL (ref 12–16)
IMM GRANULOCYTES # BLD AUTO: 0.01 K/UL (ref 0–0.04)
IMM GRANULOCYTES NFR BLD AUTO: 0.2 % (ref 0–0.5)
IRON SERPL-MCNC: 100 UG/DL (ref 30–160)
LDLC SERPL CALC-MCNC: 65.2 MG/DL (ref 63–159)
LYMPHOCYTES # BLD AUTO: 2 K/UL (ref 1–4.8)
LYMPHOCYTES NFR BLD: 38.9 % (ref 18–48)
MCH RBC QN AUTO: 29.6 PG (ref 27–31)
MCHC RBC AUTO-ENTMCNC: 32.4 G/DL (ref 32–36)
MCV RBC AUTO: 91 FL (ref 82–98)
MONOCYTES # BLD AUTO: 0.3 K/UL (ref 0.3–1)
MONOCYTES NFR BLD: 5.7 % (ref 4–15)
NEUTROPHILS # BLD AUTO: 2.6 K/UL (ref 1.8–7.7)
NEUTROPHILS NFR BLD: 50.5 % (ref 38–73)
NONHDLC SERPL-MCNC: 72 MG/DL
NRBC BLD-RTO: 0 /100 WBC
PLATELET # BLD AUTO: 224 K/UL (ref 150–450)
PMV BLD AUTO: 10.6 FL (ref 9.2–12.9)
POTASSIUM SERPL-SCNC: 4.5 MMOL/L (ref 3.5–5.1)
PROT SERPL-MCNC: 7.7 G/DL (ref 6–8.4)
RBC # BLD AUTO: 4.53 M/UL (ref 4–5.4)
SATURATED IRON: 22 % (ref 20–50)
SODIUM SERPL-SCNC: 137 MMOL/L (ref 136–145)
TOTAL IRON BINDING CAPACITY: 450 UG/DL (ref 250–450)
TRANSFERRIN SERPL-MCNC: 304 MG/DL (ref 200–375)
TRIGL SERPL-MCNC: 34 MG/DL (ref 30–150)
TSH SERPL DL<=0.005 MIU/L-ACNC: 0.79 UIU/ML (ref 0.4–4)
VIT B12 SERPL-MCNC: 605 PG/ML (ref 210–950)
WBC # BLD AUTO: 5.12 K/UL (ref 3.9–12.7)

## 2024-05-17 PROCEDURE — 85025 COMPLETE CBC W/AUTO DIFF WBC: CPT | Performed by: STUDENT IN AN ORGANIZED HEALTH CARE EDUCATION/TRAINING PROGRAM

## 2024-05-17 PROCEDURE — 83540 ASSAY OF IRON: CPT | Performed by: STUDENT IN AN ORGANIZED HEALTH CARE EDUCATION/TRAINING PROGRAM

## 2024-05-17 PROCEDURE — 82525 ASSAY OF COPPER: CPT | Performed by: STUDENT IN AN ORGANIZED HEALTH CARE EDUCATION/TRAINING PROGRAM

## 2024-05-17 PROCEDURE — 3078F DIAST BP <80 MM HG: CPT | Mod: CPTII,S$GLB,, | Performed by: STUDENT IN AN ORGANIZED HEALTH CARE EDUCATION/TRAINING PROGRAM

## 2024-05-17 PROCEDURE — 82728 ASSAY OF FERRITIN: CPT | Performed by: STUDENT IN AN ORGANIZED HEALTH CARE EDUCATION/TRAINING PROGRAM

## 2024-05-17 PROCEDURE — 82746 ASSAY OF FOLIC ACID SERUM: CPT | Performed by: STUDENT IN AN ORGANIZED HEALTH CARE EDUCATION/TRAINING PROGRAM

## 2024-05-17 PROCEDURE — 99999 PR PBB SHADOW E&M-EST. PATIENT-LVL III: CPT | Mod: PBBFAC,,, | Performed by: STUDENT IN AN ORGANIZED HEALTH CARE EDUCATION/TRAINING PROGRAM

## 2024-05-17 PROCEDURE — 84443 ASSAY THYROID STIM HORMONE: CPT | Performed by: STUDENT IN AN ORGANIZED HEALTH CARE EDUCATION/TRAINING PROGRAM

## 2024-05-17 PROCEDURE — 83036 HEMOGLOBIN GLYCOSYLATED A1C: CPT | Performed by: STUDENT IN AN ORGANIZED HEALTH CARE EDUCATION/TRAINING PROGRAM

## 2024-05-17 PROCEDURE — 84630 ASSAY OF ZINC: CPT | Performed by: STUDENT IN AN ORGANIZED HEALTH CARE EDUCATION/TRAINING PROGRAM

## 2024-05-17 PROCEDURE — 80053 COMPREHEN METABOLIC PANEL: CPT | Performed by: STUDENT IN AN ORGANIZED HEALTH CARE EDUCATION/TRAINING PROGRAM

## 2024-05-17 PROCEDURE — 3008F BODY MASS INDEX DOCD: CPT | Mod: CPTII,S$GLB,, | Performed by: STUDENT IN AN ORGANIZED HEALTH CARE EDUCATION/TRAINING PROGRAM

## 2024-05-17 PROCEDURE — 82306 VITAMIN D 25 HYDROXY: CPT | Performed by: STUDENT IN AN ORGANIZED HEALTH CARE EDUCATION/TRAINING PROGRAM

## 2024-05-17 PROCEDURE — 1159F MED LIST DOCD IN RCRD: CPT | Mod: CPTII,S$GLB,, | Performed by: STUDENT IN AN ORGANIZED HEALTH CARE EDUCATION/TRAINING PROGRAM

## 2024-05-17 PROCEDURE — 82607 VITAMIN B-12: CPT | Performed by: STUDENT IN AN ORGANIZED HEALTH CARE EDUCATION/TRAINING PROGRAM

## 2024-05-17 PROCEDURE — 84425 ASSAY OF VITAMIN B-1: CPT | Performed by: STUDENT IN AN ORGANIZED HEALTH CARE EDUCATION/TRAINING PROGRAM

## 2024-05-17 PROCEDURE — 36415 COLL VENOUS BLD VENIPUNCTURE: CPT | Performed by: STUDENT IN AN ORGANIZED HEALTH CARE EDUCATION/TRAINING PROGRAM

## 2024-05-17 PROCEDURE — 83018 HEAVY METAL QUAN EACH NES: CPT | Performed by: STUDENT IN AN ORGANIZED HEALTH CARE EDUCATION/TRAINING PROGRAM

## 2024-05-17 PROCEDURE — 99395 PREV VISIT EST AGE 18-39: CPT | Mod: S$GLB,,, | Performed by: STUDENT IN AN ORGANIZED HEALTH CARE EDUCATION/TRAINING PROGRAM

## 2024-05-17 PROCEDURE — 80061 LIPID PANEL: CPT | Performed by: STUDENT IN AN ORGANIZED HEALTH CARE EDUCATION/TRAINING PROGRAM

## 2024-05-17 PROCEDURE — 3074F SYST BP LT 130 MM HG: CPT | Mod: CPTII,S$GLB,, | Performed by: STUDENT IN AN ORGANIZED HEALTH CARE EDUCATION/TRAINING PROGRAM

## 2024-05-17 PROCEDURE — 84207 ASSAY OF VITAMIN B-6: CPT | Performed by: STUDENT IN AN ORGANIZED HEALTH CARE EDUCATION/TRAINING PROGRAM

## 2024-05-17 NOTE — PROGRESS NOTES
[facility]    Subjective:       Patient ID: Yadira Rodrigues is a 33 y.o. female.    Chief Complaint: Annual Exam      History was obtained from the patient and supplemented through chart review.  This patient is known to me.     HPI:    Patient is a 33 y.o. female who presents for annual    Breast feeding   Sleep is not ideal, but reasonable  Lots of help    Anxiety  Seeing hydroxyzine  Prn xanax, past hydroxyzine, propranolol  Zoloft 50, plans to wean around Nov 2024    Period constipation, stable  BM currently 1-2 times a day, soft    Severe situational anxiety/panic attacks    Syncope after covid, resolved    Exercise: daily    Wt Readings from Last 15 Encounters:   05/17/24 55.9 kg (123 lb 3.8 oz)   03/14/24 56.9 kg (125 lb 7.1 oz)   02/22/24 59.6 kg (131 lb 6.3 oz)   01/31/24 59 kg (130 lb 1.1 oz)   01/25/24 60.4 kg (133 lb 2.5 oz)   12/27/23 62 kg (136 lb 11 oz)   12/20/23 66.7 kg (147 lb)   12/15/23 66.8 kg (147 lb 4.3 oz)   12/13/23 67.6 kg (149 lb 0.5 oz)   12/05/23 67 kg (147 lb 11.3 oz)   11/27/23 66.2 kg (145 lb 15.1 oz)   11/15/23 65.4 kg (144 lb 2.9 oz)   10/31/23 64.8 kg (142 lb 13.7 oz)   10/17/23 63.5 kg (139 lb 15.9 oz)   10/10/23 62.9 kg (138 lb 12.5 oz)         Medical History  Past Medical History:   Diagnosis Date    Abnormal Pap smear of cervix        Review of Systems   Constitutional:  Negative for fever.   HENT:  Negative for trouble swallowing.    Respiratory:  Negative for shortness of breath.    Cardiovascular:  Negative for chest pain.   Gastrointestinal:  Negative for constipation, diarrhea, nausea and vomiting.   Musculoskeletal:  Negative for gait problem.   Neurological:  Negative for dizziness and seizures.   Psychiatric/Behavioral:  Negative for hallucinations.          Surgical hx, family hx, social hx   Have been reviewed      Current Outpatient Medications:     ALPRAZolam (XANAX) 0.5 MG tablet, Take 1 tablet (0.5 mg total) by mouth nightly as needed for  "Insomnia or Anxiety., Disp: 30 tablet, Rfl: 2    diphenhydramine HCl (UNISOM, DIPHENHYDRAMINE, ORAL), Take by mouth., Disp: , Rfl:     prenatal 25/iron fum/folic/dha (PRENATAL-1 ORAL), Take by mouth., Disp: , Rfl:     sertraline (ZOLOFT) 50 MG tablet, Take 1 tablet (50 mg total) by mouth once daily., Disp: 30 tablet, Rfl: 3    Current Facility-Administered Medications:     levonorgestreL (MIRENA) 21 mcg/24 hours (8 yrs) 52 mg IUD 1 Intra Uterine Device, 1 Intra Uterine Device, Intrauterine, , Mary Grace Benitez MD, 1 Intra Uterine Device at 01/25/24 1445    Objective:        Body mass index is 17.68 kg/m².  Vitals:    05/17/24 0755   BP: 98/70   Pulse: 67   SpO2: 100%   Weight: 55.9 kg (123 lb 3.8 oz)   Height: 5' 10" (1.778 m)   PainSc: 0-No pain     Physical Exam  Vitals and nursing note reviewed.   Constitutional:       General: She is not in acute distress.     Appearance: Normal appearance. She is not ill-appearing.   HENT:      Head: Normocephalic and atraumatic.   Eyes:      General: No scleral icterus.  Cardiovascular:      Rate and Rhythm: Normal rate and regular rhythm.      Heart sounds: Normal heart sounds.   Pulmonary:      Effort: Pulmonary effort is normal.   Musculoskeletal:         General: No deformity.      Cervical back: Normal range of motion.   Skin:     General: Skin is warm and dry.   Neurological:      Mental Status: She is alert and oriented to person, place, and time.   Psychiatric:         Behavior: Behavior normal.           Lab Results   Component Value Date    WBC 12.87 (H) 12/21/2023    HGB 9.4 (L) 12/21/2023    HCT 27.3 (L) 12/21/2023     (L) 12/21/2023    CHOL 142 02/11/2022    TRIG 42 02/11/2022    HDL 65 02/11/2022    ALT 15 07/30/2023    AST 18 07/30/2023     (L) 07/30/2023    K 3.8 07/30/2023     07/30/2023    CREATININE 0.6 07/30/2023    BUN 5 (L) 07/30/2023    CO2 19 (L) 07/30/2023    TSH 1.337 08/01/2022       The ASCVD Risk score (Leia DK, et al., " 2019) failed to calculate for the following reasons:    The 2019 ASCVD risk score is only valid for ages 40 to 79    (Imaging have been independently reviewed)    Assessment:         1. Annual physical exam    2. Consumes a vegan diet    3. History of anemia    4. Insomnia, unspecified type              Plan:     Yadira was seen today for annual exam.    Diagnoses and all orders for this visit:    Annual physical exam  -     Comprehensive Metabolic Panel; Future  -     Lipid Panel; Future  -     TSH; Future  -     CBC Auto Differential; Future  -     Hemoglobin A1C; Future    Consumes a vegan diet  -     Ferritin; Future  -     Folate; Future  -     Iron and TIBC; Future  -     Vitamin B12; Future  -     VITAMIN B6; Future  -     Vitamin D; Future  -     VITAMIN B1; Future  -     ZINC; Future  -     COPPER, SERUM; Future  -     IODINE, SERUM; Future    History of anemia  -     Ferritin; Future  -     Folate; Future  -     Iron and TIBC; Future  -     Vitamin B12; Future  -     VITAMIN B1; Future  -     ZINC; Future  -     COPPER, SERUM; Future    Insomnia, unspecified type          Health Maintenance  - Lipids:   - A1C:  - Colon Ca Screen: na  - Immunizations: utd    Women's health  - Pap: 10/21/2021 NILM and HPV neg, due in 5 years  - Mammo: na  - Dexa: na  - Contraception: IUD    Follow up in about 1 year (around 5/17/2025). or sooner prn        All medications were reviewed including potential side effects and risks/benefits.  Pt was counseled to call back if anything worsens or if questions arise.    Antoine Uribe MD  Family Medicine  [facility]  [address] [address]  Phone [phone]  Fax [phone]

## 2024-05-21 LAB
COPPER SERPL-MCNC: 1033 UG/L (ref 810–1990)
IODINE SERPL-MCNC: 56 NG/ML (ref 40–92)

## 2024-05-22 LAB — VIT B1 BLD-MCNC: 80 UG/L (ref 38–122)

## 2024-05-24 LAB
PYRIDOXAL SERPL-MCNC: 26 UG/L (ref 5–50)
ZINC SERPL-MCNC: 111 UG/DL (ref 60–130)

## 2024-07-18 ENCOUNTER — PATIENT MESSAGE (OUTPATIENT)
Dept: OBSTETRICS AND GYNECOLOGY | Facility: CLINIC | Age: 34
End: 2024-07-18
Payer: COMMERCIAL

## 2024-07-19 ENCOUNTER — OFFICE VISIT (OUTPATIENT)
Dept: URGENT CARE | Facility: CLINIC | Age: 34
End: 2024-07-19
Payer: COMMERCIAL

## 2024-07-19 VITALS
BODY MASS INDEX: 17.65 KG/M2 | DIASTOLIC BLOOD PRESSURE: 63 MMHG | WEIGHT: 123 LBS | OXYGEN SATURATION: 98 % | HEART RATE: 88 BPM | SYSTOLIC BLOOD PRESSURE: 100 MMHG | TEMPERATURE: 98 F | RESPIRATION RATE: 18 BRPM

## 2024-07-19 DIAGNOSIS — N61.0 MASTITIS: Primary | ICD-10-CM

## 2024-07-19 DIAGNOSIS — R52 BODY ACHES: ICD-10-CM

## 2024-07-19 DIAGNOSIS — O92.29 POSTPARTUM NIPPLE PAIN: ICD-10-CM

## 2024-07-19 LAB
CTP QC/QA: YES
SARS-COV-2 AG RESP QL IA.RAPID: NEGATIVE

## 2024-07-19 PROCEDURE — 87811 SARS-COV-2 COVID19 W/OPTIC: CPT | Mod: QW,S$GLB,, | Performed by: FAMILY MEDICINE

## 2024-07-19 PROCEDURE — 99214 OFFICE O/P EST MOD 30 MIN: CPT | Mod: S$GLB,,, | Performed by: FAMILY MEDICINE

## 2024-07-19 RX ORDER — AMOXICILLIN 500 MG/1
1000 TABLET, FILM COATED ORAL EVERY 12 HOURS
Qty: 40 TABLET | Refills: 0 | Status: SHIPPED | OUTPATIENT
Start: 2024-07-19 | End: 2024-07-19 | Stop reason: SDUPTHER

## 2024-07-19 RX ORDER — AMOXICILLIN 500 MG/1
1000 TABLET, FILM COATED ORAL EVERY 12 HOURS
Qty: 40 TABLET | Refills: 0 | Status: SHIPPED | OUTPATIENT
Start: 2024-07-19 | End: 2024-07-19 | Stop reason: ALTCHOICE

## 2024-07-19 RX ORDER — FLUCONAZOLE 150 MG/1
150 TABLET ORAL DAILY
Qty: 1 TABLET | Refills: 1 | Status: SHIPPED | OUTPATIENT
Start: 2024-07-19 | End: 2024-07-21

## 2024-07-19 RX ORDER — CEPHALEXIN 500 MG/1
500 CAPSULE ORAL EVERY 6 HOURS
Qty: 40 CAPSULE | Refills: 0 | Status: SHIPPED | OUTPATIENT
Start: 2024-07-19 | End: 2024-07-29

## 2024-07-19 RX ORDER — CEPHALEXIN 500 MG/1
500 CAPSULE ORAL EVERY 6 HOURS
Qty: 40 CAPSULE | Refills: 0 | Status: SHIPPED | OUTPATIENT
Start: 2024-07-19 | End: 2024-07-19 | Stop reason: SDUPTHER

## 2024-07-19 NOTE — PROGRESS NOTES
Subjective:      Patient ID: Yadira Rodrigues is a 34 y.o. female.    Vitals:  weight is 55.8 kg (123 lb). Her oral temperature is 98.3 °F (36.8 °C). Her blood pressure is 100/63 and her pulse is 88. Her respiration is 18 and oxygen saturation is 98%.     Chief Complaint: Headache    Pt states she has clogged ducts of the left breast that started 4 or 5 days ago but she says recently it has started to become painful and the skin is red. She also hasheadache and bodyaches last night. Advil has been used to help treat.    Headache   This is a new problem. Episode onset: 5 days. The problem has been unchanged. The pain is at a severity of 3/10. The pain is mild.       Neurological:  Positive for headaches.      Objective:     Physical Exam   Constitutional: She is oriented to person, place, and time.   HENT:   Head: Normocephalic and atraumatic.   Eyes: Pupils are equal, round, and reactive to light. Extraocular movement intact   Abdominal: Normal appearance.   Neurological: no focal deficit. She is alert and oriented to person, place, and time.   Skin: Skin is warm and dry.         Comments: Left breast - with red tender streaking in inner lower quadrant   Psychiatric: Her behavior is normal. Judgment and thought content normal.   Nursing note and vitals reviewed.      Assessment:     1. Mastitis    2. Body aches    3. Postpartum nipple pain        Plan:       Body aches  -     SARS Coronavirus 2 Antigen, POCT Manual Read    Mastitis  -Keflex 500 QID for 10 days  Pt or guardian provided educational materials and instructions regarding their visit diagnosis.

## 2024-07-23 ENCOUNTER — POSTPARTUM VISIT (OUTPATIENT)
Dept: OBSTETRICS AND GYNECOLOGY | Facility: CLINIC | Age: 34
End: 2024-07-23
Payer: COMMERCIAL

## 2024-07-23 VITALS
BODY MASS INDEX: 17.36 KG/M2 | SYSTOLIC BLOOD PRESSURE: 98 MMHG | DIASTOLIC BLOOD PRESSURE: 64 MMHG | WEIGHT: 121.25 LBS | HEIGHT: 70 IN

## 2024-07-23 DIAGNOSIS — Z87.898: Primary | ICD-10-CM

## 2024-07-23 PROCEDURE — 99999 PR PBB SHADOW E&M-EST. PATIENT-LVL III: CPT | Mod: PBBFAC,,, | Performed by: OBSTETRICS & GYNECOLOGY

## 2024-07-23 RX ORDER — AZITHROMYCIN 500 MG/1
1000 TABLET, FILM COATED ORAL ONCE
COMMUNITY
Start: 2024-03-21

## 2024-07-23 NOTE — PROGRESS NOTES
Gynecology    SUBJECTIVE:     Chief Complaint: Mastitis       History of Present Illness:  34 year old who presents with breastfeeding questions.  Recently went to urgent care for left sided breast pain with a mass and with chills and body aches (no fever) and was prescribed keflex. She reports she also had pus come out of the nipple (small amount).  Now, she is doing better.  She has some nipple itching and soreness, but overall better.      She was prescribed diflucan as well.     Review of Systems:  Review of Systems   Constitutional:  Negative for chills and fever.   Integumentary:  Negative for breast mass, nipple discharge and breast skin changes.   Breast: Positive for mastodynia (mild).Negative for breast self exam, lump, mass, nipple discharge and skin changes       OBJECTIVE:     Physical Exam:  Physical Exam  Vitals and nursing note reviewed.   Constitutional:       Appearance: She is well-developed.   Pulmonary:      Effort: Pulmonary effort is normal.   Chest:   Breasts:     Breasts are symmetrical.      Right: No inverted nipple, mass, nipple discharge, skin change or tenderness.      Left: No inverted nipple, mass, nipple discharge, skin change or tenderness.   Neurological:      Mental Status: She is alert and oriented to person, place, and time.   Psychiatric:         Behavior: Behavior normal.         Thought Content: Thought content normal.         Judgment: Judgment normal.         ASSESSMENT:       ICD-10-CM ICD-9-CM    1. H/O mastitis  Z87.898 V13.89              Plan:      Yadira was seen today for mastitis.    Diagnoses and all orders for this visit:    H/O mastitis    - recovering well from mastitis; only mild tenderness on exam; no other abnormal findings.   - counseled on weaning strategies.    No orders of the defined types were placed in this encounter.        Mary Grace Benitez

## 2024-08-01 ENCOUNTER — PATIENT MESSAGE (OUTPATIENT)
Dept: OBSTETRICS AND GYNECOLOGY | Facility: CLINIC | Age: 34
End: 2024-08-01
Payer: COMMERCIAL

## 2024-08-01 DIAGNOSIS — Z87.898: Primary | ICD-10-CM

## 2024-08-02 RX ORDER — CEPHALEXIN 500 MG/1
500 CAPSULE ORAL EVERY 6 HOURS
Qty: 40 CAPSULE | Refills: 0 | Status: SHIPPED | OUTPATIENT
Start: 2024-08-02 | End: 2024-08-12

## 2024-11-12 ENCOUNTER — PATIENT MESSAGE (OUTPATIENT)
Dept: PSYCHIATRY | Facility: CLINIC | Age: 34
End: 2024-11-12
Payer: COMMERCIAL

## 2024-11-19 ENCOUNTER — OFFICE VISIT (OUTPATIENT)
Dept: PSYCHIATRY | Facility: CLINIC | Age: 34
End: 2024-11-19
Payer: COMMERCIAL

## 2024-11-19 DIAGNOSIS — F51.05 INSOMNIA DUE TO MENTAL DISORDER: ICD-10-CM

## 2024-11-19 DIAGNOSIS — F41.8 POSTPARTUM ANXIETY: Primary | ICD-10-CM

## 2024-11-19 PROCEDURE — 99214 OFFICE O/P EST MOD 30 MIN: CPT | Mod: 95,,, | Performed by: STUDENT IN AN ORGANIZED HEALTH CARE EDUCATION/TRAINING PROGRAM

## 2024-11-19 PROCEDURE — 3044F HG A1C LEVEL LT 7.0%: CPT | Mod: CPTII,95,, | Performed by: STUDENT IN AN ORGANIZED HEALTH CARE EDUCATION/TRAINING PROGRAM

## 2024-11-19 RX ORDER — SERTRALINE HYDROCHLORIDE 50 MG/1
50 TABLET, FILM COATED ORAL DAILY
Qty: 30 TABLET | Refills: 3 | Status: SHIPPED | OUTPATIENT
Start: 2024-11-19

## 2024-11-19 RX ORDER — ALPRAZOLAM 0.5 MG/1
0.5 TABLET ORAL DAILY PRN
Qty: 15 TABLET | Refills: 0 | Status: SHIPPED | OUTPATIENT
Start: 2024-11-19 | End: 2024-12-19

## 2024-11-19 NOTE — PROGRESS NOTES
"OUTPATIENT PSYCHIATRY RETURN VISIT    ENCOUNTER DATE:  11/19/24   SITE:  [facility], [address]      The patient location is:  [place], not in a healthcare facility  Visit type:  audiovisual    Each patient to whom he or she provides medical services by telemedicine is:  (1) informed of the relationship between the physician and patient and the respective role of any other health care provider with respect to management of the patient; and (2) notified that he or she may decline to receive medical services by telemedicine and may withdraw from such care at any time.    CHIEF COMPLAINT:  No chief complaint on file.      HISTORY OF PRESENTING ILLNESS:  Yadira Rodrigues is a 34 y.o. female with history of Anxiety who presents for follow up appointment. She says that things are "much better." Takse 1/2 a xanax once a week.   Anxiety has been better- feels like it is more controlled than it was in the past. Baby is doing well. She is still taking the sertraline.Sleep has much improved.   Work is going well.  She is finished breastfeeding.   is good, she denies issues with marriage.  F/u in 6 months No SI, HI, AVH.    Medication side effects:  No  Medication compliance:  Yes    PSYCHIATRIC REVIEW OF SYSTEMS:  Trouble with sleep:  Improving  Appetite changes:  Not discussed  Weight changes:  Not discussed  Lack of energy:  Denies  Anhedonia:  Denies  Somatic symptoms:  Denies  Libido:  Not discussed  Anxiety/panic:  Yes as above - improving  Guilty/hopeless:  Denies  Self-injurious behavior/risky behavior:  Denies  Any drugs:  Not discussed  Alcohol:  Not discussed  Breastfeeding:  no longer breastfeeding    MEDICAL REVIEW OF SYSTEMS:  Complete review of systems performed covering Constitutional, Musculoskeletal, Neurologic.  All systems negative except for that covered in HPI.    PAST PSYCHIATRIC, MEDICAL, AND SOCIAL HISTORY REVIEWED  The patient's past medical, family and social history have been " "reviewed and updated as appropriate within the electronic medical record - see encounter notes.    MEDICATIONS:    Current Outpatient Medications:     azithromycin (ZITHROMAX) 500 MG tablet, Take 1,000 mg by mouth once. (Patient not taking: Reported on 7/23/2024), Disp: , Rfl:     abby wellington ointment, Apply topically 3 (three) times daily. Apply after feeding. Do not wash off. This compounded medication expires in 30 days. (Patient not taking: Reported on 7/23/2024), Disp: 30 g, Rfl: 0    prenatal 25/iron fum/folic/dha (PRENATAL-1 ORAL), Take by mouth., Disp: , Rfl:     sertraline (ZOLOFT) 50 MG tablet, Take 1 tablet (50 mg total) by mouth once daily., Disp: 30 tablet, Rfl: 3    Current Facility-Administered Medications:     levonorgestreL (MIRENA) 21 mcg/24 hours (8 yrs) 52 mg IUD 1 Intra Uterine Device, 1 Intra Uterine Device, Intrauterine, , Mary Grace Benitez MD, 1 Intra Uterine Device at 01/25/24 6925    ALLERGIES:  Review of patient's allergies indicates:   Allergen Reactions    Codeine Nausea And Vomiting       PSYCHIATRIC EXAM:  There were no vitals filed for this visit.  Appearance:  Well groomed, appearing healthy and of stated age  Behavior:  Cooperative, pleasant, no psychomotor agitation or retardation  Speech:  Normal rate, rhythm, prosody, and volume  Mood:  "sleep is better. Anxiety is better"  Affect:  Euthymic  Thought Process:  Linear, logical, goal directed  Thought Content:  Negative for suicidal ideation, homicidal ideation, delusions or hallucinations.  Associations:  Intact  Memory:  Grossly Intact  Level of Consciousness/Orientation:  Grossly intact  Fund of Knowledge:  Good  Attention:  Good  Language:  Fluent, able to name abstract and concrete objects  Insight:  Good  Judgment:  Intact  Psychomotor signs:  No involuntary movements of face  Gait:  Unable to assess via virtual visit      RELEVANT LABS/STUDIES:    Lab Results   Component Value Date    WBC 5.12 05/17/2024    HGB 13.4 " 05/17/2024    HCT 41.3 05/17/2024    MCV 91 05/17/2024     05/17/2024     BMP  Lab Results   Component Value Date     05/17/2024    K 4.5 05/17/2024     05/17/2024    CO2 25 05/17/2024    BUN 10 05/17/2024    CREATININE 0.9 05/17/2024    CALCIUM 9.6 05/17/2024    ANIONGAP 8 05/17/2024    ESTGFRAFRICA >60 02/11/2022    EGFRNONAA >60 02/11/2022     Lab Results   Component Value Date    ALT 15 05/17/2024    AST 16 05/17/2024    ALKPHOS 97 05/17/2024    BILITOT 1.0 05/17/2024     Lab Results   Component Value Date    TSH 0.791 05/17/2024     Lab Results   Component Value Date    HGBA1C 4.9 05/17/2024       IMPRESSION:    Yadira Rodrigues is a 34 y.o. female with history of Anxiety who presents for follow up appointment.    Status/Progress:  Based on the examination today, the patient's problem(s) is/are inadequately controlled.  New problems have been presented today.     Risk Parameters:  Patient reports no suicidal ideation  Patient reports no homicidal ideation  Patient reports no self-injurious behavior  Patient reports no violent behavior    DIAGNOSES:  Postpartum anxiety  Insomnia d/t mental disorder  Hx of performance anxiety    PLAN:  Continue Zoloft 50mg daily to better target anxiety with intrusive thoughts- pt has seen benefit at this dose.  Low threshold to increase to 100mg daily at next appointment depending on severity of her anxiety.  Pt did not respond to trazodone.   Pt can continue PRN xanax for anxiety/insomnia. Informed pt of the risks of continuous Benzodiazepine use including tolerance, dependence and withdrawals that may be life threatening upon abrupt cessation. Also advised not to take Benzodiazepines with Opiates or other sedatives and also not to drive or operate heavy machinery while using Benzodiazepines.  Discussed with patient informed consent, risks versus benefits, alternative treatments, side effect profile and the inherent unpredictability of individual responses to  these treatments.  The patient expresses understanding of the above and displays the capacity to agree with this current plan.  Referral to [facility]  therapist.  F/u 6 months or sooner PRN  Reviewed emergency precautions and numbers  No SI, HI, AVH. No thoughts of hurting infant.

## 2025-02-04 DIAGNOSIS — F41.8 POSTPARTUM ANXIETY: ICD-10-CM

## 2025-02-04 RX ORDER — SERTRALINE HYDROCHLORIDE 50 MG/1
50 TABLET, FILM COATED ORAL DAILY
Qty: 30 TABLET | Refills: 3 | Status: SHIPPED | OUTPATIENT
Start: 2025-02-04

## 2025-02-24 ENCOUNTER — OFFICE VISIT (OUTPATIENT)
Dept: PSYCHIATRY | Facility: CLINIC | Age: 35
End: 2025-02-24
Payer: COMMERCIAL

## 2025-02-24 DIAGNOSIS — F41.8 POSTPARTUM ANXIETY: Primary | ICD-10-CM

## 2025-02-24 DIAGNOSIS — F51.05 INSOMNIA DUE TO MENTAL DISORDER: ICD-10-CM

## 2025-02-24 PROCEDURE — 98006 SYNCH AUDIO-VIDEO EST MOD 30: CPT | Mod: 95,,, | Performed by: STUDENT IN AN ORGANIZED HEALTH CARE EDUCATION/TRAINING PROGRAM

## 2025-02-24 NOTE — PROGRESS NOTES
"OUTPATIENT PSYCHIATRY RETURN VISIT    ENCOUNTER DATE:  2/24/25   SITE:  [facility], [facility]      The patient location is:  [place], not in a healthcare facility  Visit type:  audiovisual    Each patient to whom he or she provides medical services by telemedicine is:  (1) informed of the relationship between the physician and patient and the respective role of any other health care provider with respect to management of the patient; and (2) notified that he or she may decline to receive medical services by telemedicine and may withdraw from such care at any time.    CHIEF COMPLAINT:  F/u anxiety, PPD    HISTORY OF PRESENTING ILLNESS:  Yadira Rodrigues is a 34 y.o. female with history of Anxiety who presents for follow up appointment. She says life is "good." She talked about wanting to get off of the zoloft. Her anxiety and mood have been quite stable. She still has some anxiety. Had to let nanny go and that was stressful, but she is coping well. Sleep has been good. Daughter sleeping through the night. She is not having insomnia anymore. Taking 1/2 xanax about once a week. No depressive symptoms, nothing she is worried about. Talked about recurrence of symptoms. Marriage is good. Baby is good. No SI, HI, AVH.    Medication side effects:  No  Medication compliance:  Yes    PSYCHIATRIC REVIEW OF SYSTEMS:  Trouble with sleep:  Improving  Appetite changes:  Not discussed  Weight changes:  Not discussed  Lack of energy:  Denies  Anhedonia:  Denies  Somatic symptoms:  Denies  Libido:  Not discussed  Anxiety/panic:  Yes as above - improving  Guilty/hopeless:  Denies  Self-injurious behavior/risky behavior:  Denies  Any drugs:  Not discussed  Alcohol:  Not discussed  Breastfeeding:  no longer breastfeeding    MEDICAL REVIEW OF SYSTEMS:  Complete review of systems performed covering Constitutional, Musculoskeletal, Neurologic.  All systems negative except for that covered in HPI.    PAST PSYCHIATRIC, " "MEDICAL, AND SOCIAL HISTORY REVIEWED  The patient's past medical, family and social history have been reviewed and updated as appropriate within the electronic medical record - see encounter notes.    MEDICATIONS:    Current Outpatient Medications:     ALPRAZolam (XANAX) 0.5 MG tablet, Take 1 tablet (0.5 mg total) by mouth daily as needed for Anxiety., Disp: 15 tablet, Rfl: 0    azithromycin (ZITHROMAX) 500 MG tablet, Take 1,000 mg by mouth once. (Patient not taking: Reported on 7/23/2024), Disp: , Rfl:     abby wellington ointment, Apply topically 3 (three) times daily. Apply after feeding. Do not wash off. This compounded medication expires in 30 days. (Patient not taking: Reported on 7/23/2024), Disp: 30 g, Rfl: 0    prenatal 25/iron fum/folic/dha (PRENATAL-1 ORAL), Take by mouth., Disp: , Rfl:     sertraline (ZOLOFT) 50 MG tablet, Take 1 tablet (50 mg total) by mouth once daily., Disp: 30 tablet, Rfl: 3    Current Facility-Administered Medications:     levonorgestreL (MIRENA) 21 mcg/24 hours (8 yrs) 52 mg IUD 1 Intra Uterine Device, 1 Intra Uterine Device, Intrauterine, , Mary Grace Benitez MD, 1 Intra Uterine Device at 01/25/24 1445    ALLERGIES:  Review of patient's allergies indicates:   Allergen Reactions    Codeine Nausea And Vomiting       PSYCHIATRIC EXAM:  There were no vitals filed for this visit.  Appearance:  Well groomed, appearing healthy and of stated age  Behavior:  Cooperative, pleasant, no psychomotor agitation or retardation  Speech:  Normal rate, rhythm, prosody, and volume  Mood:  "sleep is better. Anxiety is better"  Affect:  Euthymic  Thought Process:  Linear, logical, goal directed  Thought Content:  Negative for suicidal ideation, homicidal ideation, delusions or hallucinations.  Associations:  Intact  Memory:  Grossly Intact  Level of Consciousness/Orientation:  Grossly intact  Fund of Knowledge:  Good  Attention:  Good  Language:  Fluent, able to name abstract and concrete " objects  Insight:  Good  Judgment:  Intact  Psychomotor signs:  No involuntary movements of face  Gait:  Unable to assess via virtual visit      RELEVANT LABS/STUDIES:    Lab Results   Component Value Date    WBC 5.12 05/17/2024    HGB 13.4 05/17/2024    HCT 41.3 05/17/2024    MCV 91 05/17/2024     05/17/2024     BMP  Lab Results   Component Value Date     05/17/2024    K 4.5 05/17/2024     05/17/2024    CO2 25 05/17/2024    BUN 10 05/17/2024    CREATININE 0.9 05/17/2024    CALCIUM 9.6 05/17/2024    ANIONGAP 8 05/17/2024    ESTGFRAFRICA >60 02/11/2022    EGFRNONAA >60 02/11/2022     Lab Results   Component Value Date    ALT 15 05/17/2024    AST 16 05/17/2024    ALKPHOS 97 05/17/2024    BILITOT 1.0 05/17/2024     Lab Results   Component Value Date    TSH 0.791 05/17/2024     Lab Results   Component Value Date    HGBA1C 4.9 05/17/2024       IMPRESSION:    Yadira Rodrigues is a 34 y.o. female with history of Anxiety who presents for follow up appointment.    Status/Progress:  Based on the examination today, the patient's problem(s) is/are inadequately controlled.  New problems have been presented today.     Risk Parameters:  Patient reports no suicidal ideation  Patient reports no homicidal ideation  Patient reports no self-injurious behavior  Patient reports no violent behavior    DIAGNOSES:  Postpartum anxiety  Insomnia d/t mental disorder  Hx of performance anxiety    PLAN:  Pt wishes to get off of zoloft- decrease to 25 mg daily x 2 weeks then stop.   Pt did not respond to trazodone.   Pt can continue PRN xanax for anxiety/insomnia. Informed pt of the risks of continuous Benzodiazepine use including tolerance, dependence and withdrawals that may be life threatening upon abrupt cessation. Also advised not to take Benzodiazepines with Opiates or other sedatives and also not to drive or operate heavy machinery while using Benzodiazepines.  Discussed with patient informed consent, risks versus  benefits, alternative treatments, side effect profile and the inherent unpredictability of individual responses to these treatments.  The patient expresses understanding of the above and displays the capacity to agree with this current plan.  F/u 6 months or sooner PRN- reach out in 1 month to let me know zoloft taper is going well  Reviewed emergency precautions and numbers  No SI, HI, AVH. No thoughts of hurting infant.

## 2025-03-04 ENCOUNTER — PATIENT MESSAGE (OUTPATIENT)
Dept: INFECTIOUS DISEASES | Facility: CLINIC | Age: 35
End: 2025-03-04
Payer: COMMERCIAL

## 2025-03-13 ENCOUNTER — PATIENT MESSAGE (OUTPATIENT)
Dept: PSYCHIATRY | Facility: CLINIC | Age: 35
End: 2025-03-13
Payer: COMMERCIAL

## 2025-04-04 ENCOUNTER — OFFICE VISIT (OUTPATIENT)
Dept: INFECTIOUS DISEASES | Facility: CLINIC | Age: 35
End: 2025-04-04
Payer: COMMERCIAL

## 2025-04-04 VITALS
HEIGHT: 70 IN | DIASTOLIC BLOOD PRESSURE: 74 MMHG | TEMPERATURE: 98 F | WEIGHT: 117.5 LBS | BODY MASS INDEX: 16.82 KG/M2 | HEART RATE: 78 BPM | SYSTOLIC BLOOD PRESSURE: 109 MMHG

## 2025-04-04 DIAGNOSIS — Z23 NEED FOR IMMUNIZATION AGAINST TYPHOID: Primary | ICD-10-CM

## 2025-04-04 DIAGNOSIS — Z71.84 TRAVEL ADVICE ENCOUNTER: ICD-10-CM

## 2025-04-04 PROCEDURE — 99999 PR PBB SHADOW E&M-EST. PATIENT-LVL III: CPT | Mod: PBBFAC,,, | Performed by: PHYSICIAN ASSISTANT

## 2025-04-04 RX ORDER — AZITHROMYCIN 500 MG/1
1000 TABLET, FILM COATED ORAL ONCE
Qty: 6 TABLET | Refills: 0 | Status: SHIPPED | OUTPATIENT
Start: 2025-04-04 | End: 2025-04-04

## 2025-04-04 RX ORDER — ONDANSETRON 4 MG/1
4 TABLET, ORALLY DISINTEGRATING ORAL 2 TIMES DAILY
Qty: 24 TABLET | Refills: 0 | Status: SHIPPED | OUTPATIENT
Start: 2025-04-04 | End: 2025-04-16

## 2025-04-04 NOTE — PROGRESS NOTES
Travel Consult  Chief Complaint   Patient presents with    Travel Consult     Yadira Rodrigues is here for travel consultation.Leaving Jun [address] rd to [facility] x 4 days then [facility] x 8 days.  [place]    Accommodations: resort  Purpose of travel: vacation  Currently ill / Fever: no  History of Splenectomy: no  The patient states that she does not live with a household member that has cancer, HIV infection, or take drugs to suppress the immune system.  Past Medical History:   Diagnosis Date    Abnormal Pap smear of cervix      Codeine  Immunization History   Administered Date(s) Administered    COVID-19, MRNA, LN-S, PF (Pfizer) (Gray Cap) 06/04/2022    COVID-19, MRNA, LN-S, PF (Pfizer) (Purple Cap) 11/04/2021    COVID-19, mRNA, LNP-S, PF (Moderna) Ages 12+ 09/30/2023    COVID-19, vector-nr, rS-Ad26, PF (Wilfredo) 04/06/2021    Influenza - Quadrivalent - MDCK - PF 10/05/2022, 09/09/2023    Influenza - Quadrivalent - PF *Preferred* (6 months and older) 10/23/2021    Influenza - Trivalent - Afluria, Fluzone MDV 11/18/2023    Influenza - Trivalent - Fluarix, Flulaval, Fluzone, Afluria - PF 10/06/2024    Rsv, Bivalent, Rsvpref (Abrysvo) 11/15/2023    Tdap 10/21/2023     ASSESSMENT: Travel  PLAN:  The Patient was provided with an extensive travel guidance packet which provides travel information specific to the patients itinerary.   The patient's medical history was reviewed and the patient was counseled on:  Dietary precautions.  Personal protective measures to prevent insect-borne diseases (e.g., malaria, dengue).  Precautions to prevent exposure to rabies and seek treatment for possible exposures.  Precautions against sun exposure.  Precautions against development of DVT during flight.  Personal and travel safety.  The patient's immunization history was reviewed and, based on the patient's itinerary, immunizations were ordered.    The patient was encouraged to contact us about any problems that may  develop after immunization and possible side effects were reviewed.    The patient was instructed to purchase Imodium over the counter to take in case diarrhea (without blood or fever) develops.  An antibiotic was ordered for treatment if severe or bloody diarrhea develops and the patient was instructed on use and possible side effects.    The patient was also instructed to purchase insect repellent containing DEET or [place] and apply according to repellent label instructions.  If indicated by the patients itinerary an anti-malarial agent was prescribed for malaria prophylaxis and possible side effects were reviewed.    Prescription given for hepatitis a series though she feels she may have had that in the past but we will check her records and if she has not had it will obtained.  If she has had it over 10 years ago she will get a booster.  Oral typhoid vaccine sent into pharmacy.  She has not traveling to a malaria endemic area so malaria prophylaxis not given.  Suspected low risk for Japanese encephalitis given her itinerary so deferred  Prescription for booster of an active in polio virus provided to patient  Patient asked for and was given a prescription for Zofran p.r.n. nausea vomiting  Patient was counseled on Zika and reproductive risk then directed to the CDC website for a more in-depth education and information.  The patient was instructed to contact us if problems develop after travel.   The patient was encouraged to call the office for further concerns or complaints.  Business card provided.    45 minutes was spent on visit:  40 minutes on face-to-face counseling and 5 minutes chart completion.

## 2025-04-16 ENCOUNTER — PATIENT MESSAGE (OUTPATIENT)
Dept: INFECTIOUS DISEASES | Facility: CLINIC | Age: 35
End: 2025-04-16
Payer: COMMERCIAL

## 2025-04-16 DIAGNOSIS — Z23 NEED FOR HEPATITIS A IMMUNIZATION: Primary | ICD-10-CM

## 2025-04-16 DIAGNOSIS — Z23 NEED FOR POLIO VACCINATION: ICD-10-CM

## 2025-04-16 DIAGNOSIS — Z23 NEED FOR IMMUNIZATION AGAINST TYPHOID: ICD-10-CM

## 2025-04-16 NOTE — PROGRESS NOTES
Spoke with the patient she was unable to get the hep a, inactivated polio virus and typhoid vaccine through [facility].  She requested to get them here at [facility].  All of them have been ordered and we will arrange that in the Infectious Disease Department so she had come in and received those vaccines.    
No

## 2025-05-09 ENCOUNTER — PATIENT MESSAGE (OUTPATIENT)
Dept: INTERNAL MEDICINE | Facility: CLINIC | Age: 35
End: 2025-05-09

## 2025-05-09 ENCOUNTER — OFFICE VISIT (OUTPATIENT)
Dept: INTERNAL MEDICINE | Facility: CLINIC | Age: 35
End: 2025-05-09
Payer: COMMERCIAL

## 2025-05-09 VITALS
BODY MASS INDEX: 17.38 KG/M2 | OXYGEN SATURATION: 99 % | HEART RATE: 72 BPM | SYSTOLIC BLOOD PRESSURE: 102 MMHG | WEIGHT: 121.38 LBS | DIASTOLIC BLOOD PRESSURE: 62 MMHG | HEIGHT: 70 IN

## 2025-05-09 DIAGNOSIS — F41.8 SITUATIONAL ANXIETY: ICD-10-CM

## 2025-05-09 DIAGNOSIS — G47.25 SLEEP DISORDER, CIRCADIAN, JET LAG TYPE: Primary | ICD-10-CM

## 2025-05-09 PROCEDURE — 99999 PR PBB SHADOW E&M-EST. PATIENT-LVL IV: CPT | Mod: PBBFAC,,,

## 2025-05-09 NOTE — Clinical Note
Hey! Just want your input. Pt going to [facility] and [facility] in June, for 2 weeks. Requesting medication for sleep aid for flight. Has 1.4 y/o and plans to sleep when baby is sleeping.  From [place] and requesting Zopiclone . Not available in the [place]. Admits tried Ambien in the past that has helped. Used trazodone in the past without relief. Xanax as needed for anxiety but tries to not use for sleep aid. I don't typically prescribe Ambien because of comfort level of side effects. I'm especially concerned with pt is taking while on flight with 1.4 y/o but I told her I would discuss with you and see your comfort level... Thoughts?   I know there are some other options out there that are better for sleep travel like the melatonin receptor agonists.   Also, I want to go to [facility].   Thanks, JANETH

## 2025-05-09 NOTE — PATIENT INSTRUCTIONS
Just to review: Let me discuss with Dr. Uribe but my concerns for medication like Zolpidem (Ambien). Although it can improve sleep quality and duration it does not help with circadian misalignment or daytime symptoms. AND these meds have risks of adverse effects, including complex sleep-related behaviors which concern me with you having a 1.5 year old with you. Dr. Uribe is out today but I will loop back with you early next week.     Sleep Aid Education for Plane Travel  Traveling across multiple time zones can disrupt your sleep and lead to jet lag. Here are some tips to help you manage sleep disturbances during plane travel:  1. Light Exposure  - Exposure to bright light can help adjust your internal clock. After arriving at your destination, try to get outside during daylight hours. Avoid bright light in the evening to help your body adjust to the new time zone.  2. Melatonin:  - Melatonin is a hormone that helps regulate sleep. Taking melatonin supplements before bedtime in the new time zone can help reduce jet lag symptoms. The American Academy of Sleep Medicine suggests that melatonin can be effective, but it should be used cautiously, especially if you have epilepsy or autoimmune diseases.  3. Sleep Medications:  - Prescription sleep medications like zolpidem or temazepam can help you sleep during and after your flight. However, these medications do not help with adjusting your circadian rhythm and should be used for a short duration to avoid side effects.  4. Caffeine and Physical Activity:  - Consuming caffeine and engaging in physical activity can help reduce daytime sleepiness. However, be mindful that caffeine can also disrupt your sleep if consumed too late in the day.  5. Avoid Alcohol:  - Avoid using alcohol as a sleep aid. Alcohol can disrupt your sleep and increase the risk of sleep apnea.  6. Diet:  - Eating smaller meals before and during your flight can help reduce gastrointestinal discomfort,  a common symptom of jet lag. Regular mealtimes can also help your body adjust to the new time zone.  7. Relaxation Techniques:  - Techniques such as progressive relaxation and mindfulness can help with insomnia, although their effectiveness for jet lag specifically is not well established.  8. Plan Ahead:  - If possible, try to gradually adjust your sleep schedule a few days before your trip to match the time zone of your destination. This can help reduce the severity of jet lag.  By following these tips, you can improve your sleep and reduce the impact of jet lag during your travels. Always consult with your healthcare provider before starting any new medication or supplement.

## 2025-05-09 NOTE — PROGRESS NOTES
HPI     Chief Complaint:  Chief Complaint   Patient presents with    Medication Refill     travel       Yadira Rodrigues is a 34 y.o. female with multiple medical diagnoses as listed in the medical history and problem list that presents for   Chief Complaint   Patient presents with    Medication Refill     travel   .   Patient is not known to me.      HPI    Pt going to [facility] and [facility] in June, for 2 weeks. Requesting medication for sleep aid for flight. Has 1/4 y/o and plans to sleep when baby is sleeping.     Pt from [place] and requesting Zopiclone . Not available in the [place]. Admits tried Ambien in the past that has helped. Used trazodone in the past without relief.     Xanax as needed for anxiety but tries to not use for sleep aid.     Compliant with 25mg zoloft (not 50), mood good.     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 Sleep disorder, circadian, jet lag type  Consider Zolpidem after discussion with PCP  Precautions and education provided  -trazodone ineffective    2. Situational anxiety  F/b psychiatry, stable.  reviewed.         --------------------------------------------      Health Maintenance:  Health Maintenance         Date Due Completion Date    COVID-19 Vaccine (5 - 2024-25 season) 09/01/2024 9/30/2023    Cervical Cancer Screening 10/31/2026 10/21/2021    TETANUS VACCINE 10/21/2033 10/21/2023    RSV Vaccine (Age 60+ and Pregnant patients) (1 - 1-dose 75+ series) 05/26/2065 11/15/2023            Health maintenance reviewed    Follow Up:  Follow up if symptoms worsen or fail to improve.  Upcoming appt/annual with PCP 9/2025, will defer labs until then. No acute concerns today.     Exam     Review of Systems:  (as noted above)  Review of Systems    Physical Exam:   Physical Exam  Vitals reviewed.   Constitutional:       General: She is not in acute distress.     Appearance: Normal  "appearance. She is not toxic-appearing.   HENT:      Head: Normocephalic and atraumatic.   Cardiovascular:      Rate and Rhythm: Normal rate and regular rhythm.      Pulses: Normal pulses.      Heart sounds: Normal heart sounds. No murmur heard.  Pulmonary:      Effort: Pulmonary effort is normal. No respiratory distress.      Breath sounds: Normal breath sounds.   Musculoskeletal:      Cervical back: Normal range of motion.   Skin:     General: Skin is warm.      Capillary Refill: Capillary refill takes less than 2 seconds.   Neurological:      General: No focal deficit present.      Mental Status: She is alert and oriented to person, place, and time.   Psychiatric:         Mood and Affect: Mood normal.       Vitals:    05/09/25 0815   BP: 102/62   BP Location: Right arm   Patient Position: Sitting   Pulse: 72   SpO2: 99%   Weight: 55 kg (121 lb 5.8 oz)   Height: 5' 10" (1.778 m)      Body mass index is 17.41 kg/m².    Lab Results   Component Value Date    WBC 5.12 05/17/2024    HGB 13.4 05/17/2024    HCT 41.3 05/17/2024     05/17/2024    CHOL 152 05/17/2024    TRIG 34 05/17/2024    HDL 80 (H) 05/17/2024    ALT 15 05/17/2024    AST 16 05/17/2024     05/17/2024    K 4.5 05/17/2024     05/17/2024    CREATININE 0.9 05/17/2024    BUN 10 05/17/2024    CO2 25 05/17/2024    TSH 0.791 05/17/2024    HGBA1C 4.9 05/17/2024       The ASCVD Risk score ([place] DK, et al., 2019) failed to calculate for the following reasons:    The 2019 ASCVD risk score is only valid for ages 40 to 79    (Imaging have been independently reviewed)    History     Past Medical History:  Past Medical History:   Diagnosis Date    Abnormal Pap smear of cervix        Past Surgical History:  History reviewed. No pertinent surgical history.    Social History:  Social History[1]    Family History:  Family History   Problem Relation Name Age of Onset    Hypertension Father alive     Hyperlipidemia Father alive     Prostate cancer Father " alive         s/p resection    Hypertension Mother alive     Mental illness Sister      Mental illness Maternal Grandmother      Mental illness Paternal Grandmother      Cancer Maternal Uncle          lung?    Colon cancer Neg Hx      Breast cancer Neg Hx      Ovarian cancer Neg Hx         Allergies and Medications: (updated and reviewed)  Review of patient's allergies indicates:   Allergen Reactions    Codeine Nausea And Vomiting     Current Medications[2]    Patient Care Team:  Antoine Uribe MD as PCP - General (Family Medicine)         - The patient is given an After Visit Summary that lists all medications with directions, allergies, education, orders placed during this encounter and follow-up instructions.      - I have reviewed the patient's medical information including past medical, family, and social history sections including the medications and allergies.      - We discussed the patient's current medications.     This note was created by combination of typed  and MModal dictation.  Transcription errors may be present.  If there are any questions, please contact me.                 [1]   Social History  Socioeconomic History    Marital status:    Tobacco Use    Smoking status: Never    Smokeless tobacco: Never   Substance and Sexual Activity    Alcohol use: Not Currently     Alcohol/week: 4.0 standard drinks of alcohol     Types: 4 Standard drinks or equivalent per week     Comment: drinks socially, occasionally     Drug use: Never    Sexual activity: Yes     Partners: Male     Birth control/protection: I.U.D.     Social Drivers of Health     Financial Resource Strain: Low Risk  (1/31/2024)    Overall Financial Resource Strain (CARDIA)     Difficulty of Paying Living Expenses: Not hard at all   Food Insecurity: No Food Insecurity (1/31/2024)    Hunger Vital Sign     Worried About Running Out of Food in the Last Year: Never true     Ran Out of Food in the Last Year: Never true    Transportation Needs: No Transportation Needs (1/31/2024)    PRAPARE - Transportation     Lack of Transportation (Medical): No     Lack of Transportation (Non-Medical): No   Physical Activity: Sufficiently Active (1/31/2024)    Exercise Vital Sign     Days of Exercise per Week: 6 days     Minutes of Exercise per Session: 30 min   Stress: Stress Concern Present (1/31/2024)    [employer] - Occupational Stress Questionnaire     Feeling of Stress : Rather much   Housing Stability: Low Risk  (1/31/2024)    Housing Stability Vital Sign     Unable to Pay for Housing in the Last Year: No     Number of Places Lived in the Last Year: 1     Unstable Housing in the Last Year: No   [2]   Current Outpatient Medications   Medication Sig Dispense Refill    ALPRAZolam (XANAX) 0.5 MG tablet Take 1 tablet (0.5 mg total) by mouth daily as needed for Anxiety. 15 tablet 0    sertraline (ZOLOFT) 50 MG tablet Take 1 tablet (50 mg total) by mouth once daily. 30 tablet 3    typhoid (VIVOTIF) DR capsule Take 1 capsule by mouth every other day. 1 CAPSULE ORALLY ON DAY 1, 3, 5 AND 7 KEEP REFRIGERATED 4 capsule 0     Current Facility-Administered Medications   Medication Dose Route Frequency Provider Last Rate Last Admin    levonorgestreL (MIRENA) 21 mcg/24 hours (8 yrs) 52 mg IUD 1 Intra Uterine Device  1 Intra Uterine Device Intrauterine  Mary Grace Benitez MD   1 Intra Uterine Device at 01/25/24 5710

## 2025-05-12 ENCOUNTER — PATIENT MESSAGE (OUTPATIENT)
Dept: INTERNAL MEDICINE | Facility: CLINIC | Age: 35
End: 2025-05-12
Payer: COMMERCIAL

## 2025-05-12 DIAGNOSIS — G47.00 INSOMNIA, UNSPECIFIED TYPE: Primary | ICD-10-CM

## 2025-05-14 RX ORDER — ZOLPIDEM TARTRATE 5 MG/1
5 TABLET ORAL NIGHTLY PRN
Qty: 10 TABLET | Refills: 0 | Status: SHIPPED | OUTPATIENT
Start: 2025-05-14 | End: 2025-11-12

## 2025-05-16 ENCOUNTER — CLINICAL SUPPORT (OUTPATIENT)
Dept: INFECTIOUS DISEASES | Facility: CLINIC | Age: 35
End: 2025-05-16

## 2025-05-16 DIAGNOSIS — Z23 NEED FOR IMMUNIZATION AGAINST TYPHOID: Primary | ICD-10-CM

## 2025-05-16 PROCEDURE — 99999 PR PBB SHADOW E&M-EST. PATIENT-LVL I: CPT | Mod: PBBFAC,,,

## 2025-05-16 NOTE — PROGRESS NOTES
Pt received Typhoid vaccine IM to right deltoid. Pt tolerated injection well and departed from clinic in [facility].

## 2025-05-20 ENCOUNTER — PATIENT MESSAGE (OUTPATIENT)
Dept: INTERNAL MEDICINE | Facility: CLINIC | Age: 35
End: 2025-05-20
Payer: COMMERCIAL

## 2025-06-03 ENCOUNTER — OFFICE VISIT (OUTPATIENT)
Dept: PSYCHIATRY | Facility: CLINIC | Age: 35
End: 2025-06-03
Payer: COMMERCIAL

## 2025-06-03 ENCOUNTER — OFFICE VISIT (OUTPATIENT)
Dept: OBSTETRICS AND GYNECOLOGY | Facility: CLINIC | Age: 35
End: 2025-06-03
Payer: COMMERCIAL

## 2025-06-03 VITALS
BODY MASS INDEX: 17.46 KG/M2 | SYSTOLIC BLOOD PRESSURE: 94 MMHG | WEIGHT: 121.94 LBS | DIASTOLIC BLOOD PRESSURE: 60 MMHG | HEIGHT: 70 IN

## 2025-06-03 DIAGNOSIS — F51.05 INSOMNIA DUE TO MENTAL DISORDER: ICD-10-CM

## 2025-06-03 DIAGNOSIS — F41.8 POSTPARTUM ANXIETY: ICD-10-CM

## 2025-06-03 DIAGNOSIS — Z01.419 WELL WOMAN EXAM WITH ROUTINE GYNECOLOGICAL EXAM: Primary | ICD-10-CM

## 2025-06-03 PROCEDURE — 3008F BODY MASS INDEX DOCD: CPT | Mod: CPTII,S$GLB,, | Performed by: OBSTETRICS & GYNECOLOGY

## 2025-06-03 PROCEDURE — 1159F MED LIST DOCD IN RCRD: CPT | Mod: CPTII,S$GLB,, | Performed by: OBSTETRICS & GYNECOLOGY

## 2025-06-03 PROCEDURE — 98006 SYNCH AUDIO-VIDEO EST MOD 30: CPT | Mod: 95,,, | Performed by: STUDENT IN AN ORGANIZED HEALTH CARE EDUCATION/TRAINING PROGRAM

## 2025-06-03 PROCEDURE — 99395 PREV VISIT EST AGE 18-39: CPT | Mod: S$GLB,,, | Performed by: OBSTETRICS & GYNECOLOGY

## 2025-06-03 PROCEDURE — 3078F DIAST BP <80 MM HG: CPT | Mod: CPTII,S$GLB,, | Performed by: OBSTETRICS & GYNECOLOGY

## 2025-06-03 PROCEDURE — 3074F SYST BP LT 130 MM HG: CPT | Mod: CPTII,S$GLB,, | Performed by: OBSTETRICS & GYNECOLOGY

## 2025-06-03 PROCEDURE — 99999 PR PBB SHADOW E&M-EST. PATIENT-LVL III: CPT | Mod: PBBFAC,,, | Performed by: OBSTETRICS & GYNECOLOGY

## 2025-06-03 RX ORDER — SERTRALINE HYDROCHLORIDE 50 MG/1
25 TABLET, FILM COATED ORAL DAILY
Qty: 30 TABLET | Refills: 3 | Status: SHIPPED | OUTPATIENT
Start: 2025-06-03

## 2025-06-03 RX ORDER — ALPRAZOLAM 0.5 MG/1
0.5 TABLET ORAL DAILY PRN
Qty: 20 TABLET | Refills: 0 | Status: SHIPPED | OUTPATIENT
Start: 2025-06-03 | End: 2025-07-03

## 2025-06-03 NOTE — PROGRESS NOTES
The patient location is: [place]  The chief complaint leading to consultation is: f.u    OUTPATIENT PSYCHIATRY RETURN VISIT    ENCOUNTER DATE:  6/3/25   SITE:  [facility]      The patient location is:  [place], not in a healthcare facility  Visit type:  audiovisual    Each patient to whom he or she provides medical services by telemedicine is:  (1) informed of the relationship between the physician and patient and the respective role of any other health care provider with respect to management of the patient; and (2) notified that he or she may decline to receive medical services by telemedicine and may withdraw from such care at any time.    CHIEF COMPLAINT:  F/u anxiety, PPD    HISTORY OF PRESENTING ILLNESS:  Yadira Rodrigues is a 35 y.o. female with history of Anxiety who presents for follow up appointment. Pt got off zoloft and felt like she was more irritable, so she has stayed on the 25 mg. About to go on a trip to [facility]. She is worried she won't be able to sleep. Asked whether OK to take xanax for sleep. Discussed the risk fo dream reenactment behavior on z drugs which frightens her.    Taking 1/2 xanax about once a week. No depressive symptoms, nothing she is worried about. Talked about recurrence of symptoms. Marriage is good. Baby is good. No SI, HI, AVH.    Medication side effects:  No  Medication compliance:  Yes    PSYCHIATRIC REVIEW OF SYSTEMS:  Trouble with sleep:  Improving  Appetite changes:  Not discussed  Weight changes:  Not discussed  Lack of energy:  Denies  Anhedonia:  Denies  Somatic symptoms:  Denies  Libido:  Not discussed  Anxiety/panic:  Yes as above - improving  Guilty/hopeless:  Denies  Self-injurious behavior/risky behavior:  Denies  Any drugs:  Not discussed  Alcohol:  Not discussed  Breastfeeding:  no longer breastfeeding    MEDICAL REVIEW OF SYSTEMS:  Complete review of systems performed covering Constitutional, Musculoskeletal, Neurologic.  All  "systems negative except for that covered in HPI.    PAST PSYCHIATRIC, MEDICAL, AND SOCIAL HISTORY REVIEWED  The patient's past medical, family and social history have been reviewed and updated as appropriate within the electronic medical record - see encounter notes.    MEDICATIONS:    Current Outpatient Medications:     ALPRAZolam (XANAX) 0.5 MG tablet, Take 1 tablet (0.5 mg total) by mouth daily as needed for Anxiety., Disp: 15 tablet, Rfl: 0    sertraline (ZOLOFT) 50 MG tablet, Take 1 tablet (50 mg total) by mouth once daily., Disp: 30 tablet, Rfl: 3    typhoid (VIVOTIF) DR capsule, Take 1 capsule by mouth every other day. 1 CAPSULE ORALLY ON DAY 1, 3, 5 AND 7 KEEP REFRIGERATED (Patient not taking: Reported on 6/3/2025), Disp: 4 capsule, Rfl: 0    zolpidem (AMBIEN) 5 MG Tab, Take 1 tablet (5 mg total) by mouth nightly as needed (be cautious)., Disp: 10 tablet, Rfl: 0    Current Facility-Administered Medications:     levonorgestreL (MIRENA) 21 mcg/24 hours (8 yrs) 52 mg IUD 1 Intra Uterine Device, 1 Intra Uterine Device, Intrauterine, , Mary Grace Benitez MD, 1 Intra Uterine Device at 01/25/24 0565    ALLERGIES:  Review of patient's allergies indicates:   Allergen Reactions    Codeine Nausea And Vomiting       PSYCHIATRIC EXAM:  There were no vitals filed for this visit.  Appearance:  Well groomed, appearing healthy and of stated age  Behavior:  Cooperative, pleasant, no psychomotor agitation or retardation  Speech:  Normal rate, rhythm, prosody, and volume  Mood:  "good"  Affect:  Euthymic  Thought Process:  Linear, logical, goal directed  Thought Content:  Negative for suicidal ideation, homicidal ideation, delusions or hallucinations.  Associations:  Intact  Memory:  Grossly Intact  Level of Consciousness/Orientation:  Grossly intact  Fund of Knowledge:  Good  Attention:  Good  Language:  Fluent, able to name abstract and concrete objects  Insight:  Good  Judgment:  Intact  Psychomotor signs:  No " involuntary movements of face  Gait:  Unable to assess via virtual visit      RELEVANT LABS/STUDIES:    Lab Results   Component Value Date    WBC 5.12 05/17/2024    HGB 13.4 05/17/2024    HCT 41.3 05/17/2024    MCV 91 05/17/2024     05/17/2024     BMP  Lab Results   Component Value Date     05/17/2024    K 4.5 05/17/2024     05/17/2024    CO2 25 05/17/2024    BUN 10 05/17/2024    CREATININE 0.9 05/17/2024    CALCIUM 9.6 05/17/2024    ANIONGAP 8 05/17/2024    ESTGFRAFRICA >60 02/11/2022    EGFRNONAA >60 02/11/2022     Lab Results   Component Value Date    ALT 15 05/17/2024    AST 16 05/17/2024    ALKPHOS 97 05/17/2024    BILITOT 1.0 05/17/2024     Lab Results   Component Value Date    TSH 0.791 05/17/2024     Lab Results   Component Value Date    HGBA1C 4.9 05/17/2024       IMPRESSION:    Yadira Rodrigues is a 35 y.o. female with history of Anxiety who presents for follow up appointment.    Status/Progress:  Based on the examination today, the patient's problem(s) is/are inadequately controlled.  New problems have been presented today.     Risk Parameters:  Patient reports no suicidal ideation  Patient reports no homicidal ideation  Patient reports no self-injurious behavior  Patient reports no violent behavior    DIAGNOSES:  Postpartum anxiety  Insomnia d/t mental disorder  Hx of performance anxiety    PLAN:    Pt did not respond to trazodone.   Pt can continue PRN xanax for anxiety/insomnia. Informed pt of the risks of continuous Benzodiazepine use including tolerance, dependence and withdrawals that may be life threatening upon abrupt cessation. Also advised not to take Benzodiazepines with Opiates or other sedatives and also not to drive or operate heavy machinery while using Benzodiazepines.  Discussed with patient informed consent, risks versus benefits, alternative treatments, side effect profile and the inherent unpredictability of individual responses to these treatments.  The patient  expresses understanding of the above and displays the capacity to agree with this current plan.  F/u 6 months or sooner PRN- reach out in 1 month to let me know zoloft taper is going well  Reviewed emergency precautions and numbers  No SI, HI, AVH. No thoughts of hurting infant.         on the encounter, which includes face to face time and non-face to face time preparing to see the patient (eg, review of tests), Obtaining and/or reviewing separately obtained history, Documenting clinical information in the electronic or other health record, Independently interpreting results (not separately reported) and communicating results to the patient/family/caregiver, or Care coordination (not separately reported).         Each patient to whom he or she provides medical services by telemedicine is:  (1) informed of the relationship between the physician and patient and the respective role of any other health care provider with respect to management of the patient; and (2) notified that he or she may decline to receive medical services by telemedicine and may withdraw from such care at any time.    Notes:

## 2025-06-08 ENCOUNTER — PATIENT MESSAGE (OUTPATIENT)
Dept: INTERNAL MEDICINE | Facility: CLINIC | Age: 35
End: 2025-06-08
Payer: COMMERCIAL

## 2025-07-25 ENCOUNTER — CLINICAL SUPPORT (OUTPATIENT)
Facility: CLINIC | Age: 35
End: 2025-07-25
Payer: COMMERCIAL

## 2025-07-25 ENCOUNTER — OFFICE VISIT (OUTPATIENT)
Dept: OTOLARYNGOLOGY | Facility: CLINIC | Age: 35
End: 2025-07-25
Payer: COMMERCIAL

## 2025-07-25 VITALS
HEIGHT: 70 IN | BODY MASS INDEX: 17.46 KG/M2 | DIASTOLIC BLOOD PRESSURE: 74 MMHG | WEIGHT: 121.94 LBS | SYSTOLIC BLOOD PRESSURE: 124 MMHG | HEART RATE: 85 BPM

## 2025-07-25 DIAGNOSIS — H69.92 DYSFUNCTION OF LEFT EUSTACHIAN TUBE: ICD-10-CM

## 2025-07-25 DIAGNOSIS — H90.12 CONDUCTIVE HEARING LOSS OF LEFT EAR WITH UNRESTRICTED HEARING OF RIGHT EAR: Primary | ICD-10-CM

## 2025-07-25 DIAGNOSIS — H91.8X2 OTHER HEARING LOSS OF LEFT EAR WITH UNRESTRICTED HEARING OF RIGHT EAR: Primary | ICD-10-CM

## 2025-07-25 DIAGNOSIS — H91.8X2 OTHER HEARING LOSS OF LEFT EAR WITH UNRESTRICTED HEARING OF RIGHT EAR: ICD-10-CM

## 2025-07-25 RX ORDER — FLUTICASONE PROPIONATE 50 MCG
2 SPRAY, SUSPENSION (ML) NASAL DAILY
Qty: 16 G | Refills: 11 | Status: SHIPPED | OUTPATIENT
Start: 2025-07-25

## 2025-07-25 RX ORDER — PREDNISONE 20 MG/1
20 TABLET ORAL DAILY
Qty: 5 TABLET | Refills: 0 | Status: SHIPPED | OUTPATIENT
Start: 2025-07-25 | End: 2025-07-30

## 2025-07-25 NOTE — PROGRESS NOTES
7/25/2025    Audiologic Evaluation    Yadira Rodrigues was seen today in the clinic for an audiologic evaluation.  Patient's main complaint was decreased hearing, tinnitus, otalgia, and aural fullness in the left ear. Mrs. Rodrigues reported her symptoms began approximately a month ago she scratched her left ear. She reported her tinnitus and otalgia has since resolved, but her hearing loss and aural fullness having taken a significant amount of time to resolve. Mrs. Rodrigues denied true vertigo and history of noise exposure.    Tympanometry revealed Type A in the right ear and Type Ad in the left ear.     Audiogram results revealed normal hearing sensitivity in the right ear and essentially normal hearing sensitivity with a mild conductive hearing loss (CHL) at 2000 Hz and a hearing loss from 0453-8694 Hz in the left ear.      Speech reception thresholds were noted at 5 dBHL in the right ear and 15 dBHL in the left ear.    Speech discrimination scores were 100% in the right ear and 100% in the left ear.    Recommendations:  Otologic evaluation  Repeat audiogram per ENT plan, annually, or sooner if change perceived  Hearing protection in noise

## 2025-07-25 NOTE — PROGRESS NOTES
Ear, Nose, & Throat  Otolaryngology - Head & Neck Surgery        Subjective:     Chief Complaint:   Chief Complaint   Patient presents with    Ear Problem       Yadira Rodrigues is a 35 y.o. female who was self-referred for hearing loss.    Around 1 month ago she was scratching left ear with a sharp nail. Experienced instant tinnitus, bleeding, aural fullness and hearing loss. Was seen at an urgent care d/t an upcoming flight. She was given antibiotics and sudafed. Does not believe it benefited her. Her otalgia and tinnitus resolved, but she has had persistent fullness and hearing loss AS. Feels that it is improving very slowly. No significant otologic history otherwise.     She does feel the need to clear or pop her ears, which temporarily changes the fullness. She does not have any significant nasal obstruction or congestion. This entire event was preceded symptoms consistent with an upper respiratory infection.     Past Medical History  Active Ambulatory Problems     Diagnosis Date Noted    Consumes a vegan diet 2022    History of anemia 2022    Situational anxiety 2022    Amenorrhea 2023    Obstetrical laceration 2023     Resolved Ambulatory Problems     Diagnosis Date Noted    Normal pregnancy in third trimester 11/15/2023    Encounter for induction of labor 2023     (spontaneous vaginal delivery) 2023     Past Medical History:   Diagnosis Date    Abnormal Pap smear of cervix        Past Surgical History  She has no past surgical history on file.    No past surgical history on file.     Family History  Her family history includes Cancer in her maternal uncle; Hyperlipidemia in her father; Hypertension in her father and mother; Mental illness in her maternal grandmother, paternal grandmother, and sister; Prostate cancer in her father.    Social History  She reports that she has never smoked. She has never used smokeless tobacco. She reports that she does not  "currently use alcohol after a past usage of about 4.0 standard drinks of alcohol per week. She reports that she does not use drugs.    Allergies  She is allergic to codeine.    Medications  She has a current medication list which includes the following prescription(s): sertraline, zolpidem, alprazolam, fluticasone propionate, and typhoid, and the following Facility-Administered Medications: levonorgestrel.    ROS:  Pertinent positive and negative review of systems as noted in HPI.     Objective:     /74   Pulse 85   Ht 5' 10" (1.778 m)   Wt 55.3 kg (121 lb 14.6 oz)   BMI 17.49 kg/m²    Physical Exam  Constitutional: Well appearing, communicating. No acute distress  Voice: Euphonic  Eyes: Conjunctiva WNL, Pupils reactive  Head/Face: House Brackmann I Bilaterally.  Right Ear:    Auricle normally developed   EAC: normal   Tympanic membrane: intact   Middle Ear: No effusion present and Ossicles in normal position  Left Ear:    Auricle normally developed   EAC: normal   Tympanic membrane: intact   Middle Ear: No effusion present and Ossicles in normal position  Hearing:[place]: 512 Hz    Sims: midline          Rinne:      Right: Air conduction > bone conduction      Left: Air conduction > bone conduction  Vestibular:    Spontaneous Nystagmus: none  Neuro/Psychiatric:     Affect: Appropriate   Eyes: EOMI intact  Respiratory: No increased WOB, no stridor     Data Review:   MEDICAL RECORDS    I have reviewed the following medical records relevant to the care of this patient from unique sources outside of my institution or departmental specialty:  None    LABS  Hemoglobin (g/dL)   Date Value   05/17/2024 13.4     WBC (K/uL)   Date Value   05/17/2024 5.12     Platelets (K/uL)   Date Value   05/17/2024 224     Creatinine (mg/dL)   Date Value   05/17/2024 0.9     Calcium (mg/dL)   Date Value   05/17/2024 9.6      I have independently reviewed the lab results shown above. Findings significant for the conditions " being treated include: none    IMAGING    No pertinent imaging available    AUDIO    not performed    Procedures:       Assessment:     1. Other hearing loss of left ear with unrestricted hearing of right ear    2. Dysfunction of left eustachian tube      Plan:     I had a long discussion with the patient regarding her condition and possible management strategies. As I discussed with her, I suspect that she has obstructive eustachian tube dysfunction of the left eustachian tube(s). We discussed a short course of oral steroids and intranasal steroid. We discussed the rationale of both treatments. The OCS is intended to provide acute symptoms relief while also giving insight into the reversibility of the obstruction; however, we discussed how this is not a reasonable long-term plan. We discussed that the maximum efficacy of the intranasal steroid occurs around 2-4 weeks of continuous use, and would provide maintenance therapy for their condition.. The patient was afforded an opportunity to ask questions and showed good understanding of their condition.    Given her subjective asymmetric HL, would recommend a routine audiogram. Will message with results if concerning. RTC in 1 month for follow-up.     Voice recognition software was used in the creation of this note/communication and any sound-alike errors which may have occurred from its use should be taken in context when interpreting. If such errors prevent a clear understanding of the note/communication, please contact the office for clarification.     Orders Placed This Encounter   Procedures    Ambulatory referral/consult to Audiology      Medications Ordered This Encounter   Medications    fluticasone propionate (FLONASE) 50 mcg/actuation nasal spray

## 2025-07-29 ENCOUNTER — OFFICE VISIT (OUTPATIENT)
Dept: OBSTETRICS AND GYNECOLOGY | Facility: CLINIC | Age: 35
End: 2025-07-29
Payer: COMMERCIAL

## 2025-07-29 VITALS
HEIGHT: 70 IN | DIASTOLIC BLOOD PRESSURE: 71 MMHG | BODY MASS INDEX: 17.13 KG/M2 | SYSTOLIC BLOOD PRESSURE: 106 MMHG | WEIGHT: 119.69 LBS

## 2025-07-29 DIAGNOSIS — N89.8 VAGINAL IRRITATION: ICD-10-CM

## 2025-07-29 DIAGNOSIS — Z30.432 ENCOUNTER FOR IUD REMOVAL: Primary | ICD-10-CM

## 2025-07-29 PROCEDURE — 58301 REMOVE INTRAUTERINE DEVICE: CPT | Mod: S$GLB,,, | Performed by: STUDENT IN AN ORGANIZED HEALTH CARE EDUCATION/TRAINING PROGRAM

## 2025-07-29 PROCEDURE — 99999 PR PBB SHADOW E&M-EST. PATIENT-LVL III: CPT | Mod: PBBFAC,,, | Performed by: STUDENT IN AN ORGANIZED HEALTH CARE EDUCATION/TRAINING PROGRAM

## 2025-07-29 PROCEDURE — 99499 UNLISTED E&M SERVICE: CPT | Mod: S$GLB,,, | Performed by: STUDENT IN AN ORGANIZED HEALTH CARE EDUCATION/TRAINING PROGRAM

## 2025-07-29 RX ORDER — TRETINOIN 0.25 MG/G
1 CREAM TOPICAL NIGHTLY
COMMUNITY
Start: 2025-07-17

## 2025-07-29 NOTE — PROCEDURES
Removal of IUD    Date/Time: 7/29/2025 8:30 AM    Performed by: Caitlin Goldstein MD  Authorized by: Caitlin Goldstein MD    Consent obtained:  Prior to procedure the appropriate consent was completed and verified  Consent given by:  Patient  Procedure risks and benefits discussed: yes    Patient questions answered: yes    Patient agrees, verbalizes understanding, and wants to proceed: yes    Removal mechanism: Amanda clamp.  Other reason for removal:  Desire for future fertility  Removed with no complications: yes      Caitlin Goldstein M.D.

## 2025-08-04 ENCOUNTER — PATIENT MESSAGE (OUTPATIENT)
Dept: OBSTETRICS AND GYNECOLOGY | Facility: CLINIC | Age: 35
End: 2025-08-04
Payer: COMMERCIAL

## 2025-08-04 DIAGNOSIS — B37.9 CANDIDA GLABRATA INFECTION: Primary | ICD-10-CM

## 2025-08-04 RX ORDER — BORIC ACID
600 GRANULES (GRAM) MISCELLANEOUS NIGHTLY
Qty: 14 G | Refills: 0 | Status: SHIPPED | OUTPATIENT
Start: 2025-08-04 | End: 2025-08-18

## 2025-08-11 ENCOUNTER — OFFICE VISIT (OUTPATIENT)
Dept: INTERNAL MEDICINE | Facility: CLINIC | Age: 35
End: 2025-08-11
Payer: COMMERCIAL

## 2025-08-11 VITALS
HEART RATE: 74 BPM | HEIGHT: 70 IN | BODY MASS INDEX: 17.33 KG/M2 | DIASTOLIC BLOOD PRESSURE: 62 MMHG | SYSTOLIC BLOOD PRESSURE: 104 MMHG | WEIGHT: 121.06 LBS | OXYGEN SATURATION: 99 %

## 2025-08-11 DIAGNOSIS — Z00.00 ROUTINE GENERAL MEDICAL EXAMINATION AT A HEALTH CARE FACILITY: Primary | ICD-10-CM

## 2025-08-11 PROCEDURE — 3008F BODY MASS INDEX DOCD: CPT | Mod: CPTII,S$GLB,, | Performed by: INTERNAL MEDICINE

## 2025-08-11 PROCEDURE — 99999 PR PBB SHADOW E&M-EST. PATIENT-LVL IV: CPT | Mod: PBBFAC,,, | Performed by: INTERNAL MEDICINE

## 2025-08-11 PROCEDURE — 3074F SYST BP LT 130 MM HG: CPT | Mod: CPTII,S$GLB,, | Performed by: INTERNAL MEDICINE

## 2025-08-11 PROCEDURE — 99395 PREV VISIT EST AGE 18-39: CPT | Mod: S$GLB,,, | Performed by: INTERNAL MEDICINE

## 2025-08-11 PROCEDURE — 1159F MED LIST DOCD IN RCRD: CPT | Mod: CPTII,S$GLB,, | Performed by: INTERNAL MEDICINE

## 2025-08-11 PROCEDURE — 1160F RVW MEDS BY RX/DR IN RCRD: CPT | Mod: CPTII,S$GLB,, | Performed by: INTERNAL MEDICINE

## 2025-08-11 PROCEDURE — 3078F DIAST BP <80 MM HG: CPT | Mod: CPTII,S$GLB,, | Performed by: INTERNAL MEDICINE

## 2025-08-21 ENCOUNTER — OFFICE VISIT (OUTPATIENT)
Dept: OBSTETRICS AND GYNECOLOGY | Facility: CLINIC | Age: 35
End: 2025-08-21
Payer: COMMERCIAL

## 2025-08-21 VITALS
DIASTOLIC BLOOD PRESSURE: 70 MMHG | SYSTOLIC BLOOD PRESSURE: 102 MMHG | WEIGHT: 120.13 LBS | BODY MASS INDEX: 17.24 KG/M2

## 2025-08-21 DIAGNOSIS — Z86.19 HISTORY OF CANDIDAL VULVOVAGINITIS: Primary | ICD-10-CM

## 2025-08-21 PROCEDURE — 99999 PR PBB SHADOW E&M-EST. PATIENT-LVL III: CPT | Mod: PBBFAC,,, | Performed by: STUDENT IN AN ORGANIZED HEALTH CARE EDUCATION/TRAINING PROGRAM

## 2025-08-26 ENCOUNTER — OFFICE VISIT (OUTPATIENT)
Dept: OTOLARYNGOLOGY | Facility: CLINIC | Age: 35
End: 2025-08-26
Payer: COMMERCIAL

## 2025-08-26 VITALS
SYSTOLIC BLOOD PRESSURE: 99 MMHG | DIASTOLIC BLOOD PRESSURE: 67 MMHG | HEIGHT: 70 IN | HEART RATE: 83 BPM | WEIGHT: 120.13 LBS | BODY MASS INDEX: 17.2 KG/M2

## 2025-08-26 DIAGNOSIS — H90.12 CONDUCTIVE HEARING LOSS OF LEFT EAR WITH UNRESTRICTED HEARING OF RIGHT EAR: Primary | ICD-10-CM

## 2025-08-26 PROCEDURE — 3008F BODY MASS INDEX DOCD: CPT | Mod: CPTII,S$GLB,,

## 2025-08-26 PROCEDURE — 3074F SYST BP LT 130 MM HG: CPT | Mod: CPTII,S$GLB,,

## 2025-08-26 PROCEDURE — 3078F DIAST BP <80 MM HG: CPT | Mod: CPTII,S$GLB,,

## 2025-08-26 PROCEDURE — 1159F MED LIST DOCD IN RCRD: CPT | Mod: CPTII,S$GLB,,

## 2025-08-26 PROCEDURE — 99213 OFFICE O/P EST LOW 20 MIN: CPT | Mod: S$GLB,,,

## 2025-08-28 ENCOUNTER — HOSPITAL ENCOUNTER (OUTPATIENT)
Dept: RADIOLOGY | Facility: OTHER | Age: 35
Discharge: HOME | End: 2025-08-28
Payer: COMMERCIAL

## 2025-08-28 DIAGNOSIS — H90.12 CONDUCTIVE HEARING LOSS OF LEFT EAR WITH UNRESTRICTED HEARING OF RIGHT EAR: ICD-10-CM

## 2025-08-28 PROCEDURE — 70480 CT ORBIT/EAR/FOSSA W/O DYE: CPT | Mod: TC

## 2025-08-29 ENCOUNTER — OFFICE VISIT (OUTPATIENT)
Dept: URGENT CARE | Facility: CLINIC | Age: 35
End: 2025-08-29
Payer: COMMERCIAL

## 2025-08-29 ENCOUNTER — ON-DEMAND VIRTUAL (OUTPATIENT)
Dept: URGENT CARE | Facility: CLINIC | Age: 35
End: 2025-08-29
Payer: COMMERCIAL

## 2025-08-29 VITALS
SYSTOLIC BLOOD PRESSURE: 111 MMHG | BODY MASS INDEX: 17.2 KG/M2 | HEIGHT: 70 IN | WEIGHT: 120.13 LBS | HEART RATE: 58 BPM | TEMPERATURE: 98 F | RESPIRATION RATE: 18 BRPM | DIASTOLIC BLOOD PRESSURE: 73 MMHG | OXYGEN SATURATION: 99 %

## 2025-08-29 DIAGNOSIS — Z86.69 HX OF BLURRED VISION: ICD-10-CM

## 2025-08-29 DIAGNOSIS — H53.9 VISION CHANGES: Primary | ICD-10-CM

## 2025-08-29 DIAGNOSIS — H53.10 EYE STRAIN: ICD-10-CM
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
## 2025-08-29 DIAGNOSIS — R42 DIZZINESS: ICD-10-CM
